# Patient Record
Sex: FEMALE | Race: WHITE | NOT HISPANIC OR LATINO | Employment: OTHER | URBAN - METROPOLITAN AREA
[De-identification: names, ages, dates, MRNs, and addresses within clinical notes are randomized per-mention and may not be internally consistent; named-entity substitution may affect disease eponyms.]

---

## 2023-04-26 LAB
ALBUMIN (G/DL) IN SER/PLAS: 4.2 G/DL (ref 3.4–5)
ANION GAP IN SER/PLAS: 14 MMOL/L (ref 10–20)
CALCIUM (MG/DL) IN SER/PLAS: 8.7 MG/DL (ref 8.6–10.3)
CARBON DIOXIDE, TOTAL (MMOL/L) IN SER/PLAS: 26 MMOL/L (ref 21–32)
CHLORIDE (MMOL/L) IN SER/PLAS: 104 MMOL/L (ref 98–107)
CREATININE (MG/DL) IN SER/PLAS: 2.29 MG/DL (ref 0.5–1.05)
ERYTHROCYTE DISTRIBUTION WIDTH (RATIO) BY AUTOMATED COUNT: 13.2 % (ref 11.5–14.5)
ERYTHROCYTE MEAN CORPUSCULAR HEMOGLOBIN CONCENTRATION (G/DL) BY AUTOMATED: 31 G/DL (ref 32–36)
ERYTHROCYTE MEAN CORPUSCULAR VOLUME (FL) BY AUTOMATED COUNT: 96 FL (ref 80–100)
ERYTHROCYTES (10*6/UL) IN BLOOD BY AUTOMATED COUNT: 3.73 X10E12/L (ref 4–5.2)
GFR FEMALE: 20 ML/MIN/1.73M2
GLUCOSE (MG/DL) IN SER/PLAS: 95 MG/DL (ref 74–99)
HEMATOCRIT (%) IN BLOOD BY AUTOMATED COUNT: 35.8 % (ref 36–46)
HEMOGLOBIN (G/DL) IN BLOOD: 11.1 G/DL (ref 12–16)
LEUKOCYTES (10*3/UL) IN BLOOD BY AUTOMATED COUNT: 5.6 X10E9/L (ref 4.4–11.3)
PHOSPHATE (MG/DL) IN SER/PLAS: 4 MG/DL (ref 2.5–4.9)
PLATELETS (10*3/UL) IN BLOOD AUTOMATED COUNT: 272 X10E9/L (ref 150–450)
POTASSIUM (MMOL/L) IN SER/PLAS: 5 MMOL/L (ref 3.5–5.3)
SODIUM (MMOL/L) IN SER/PLAS: 139 MMOL/L (ref 136–145)
UREA NITROGEN (MG/DL) IN SER/PLAS: 33 MG/DL (ref 6–23)

## 2023-04-27 LAB — PARATHYRIN INTACT (PG/ML) IN SER/PLAS: 72.5 PG/ML (ref 18.5–88)

## 2023-06-19 LAB
ERYTHROCYTE DISTRIBUTION WIDTH (RATIO) BY AUTOMATED COUNT: 12.8 % (ref 11.5–14.5)
ERYTHROCYTE MEAN CORPUSCULAR HEMOGLOBIN CONCENTRATION (G/DL) BY AUTOMATED: 31 G/DL (ref 32–36)
ERYTHROCYTE MEAN CORPUSCULAR VOLUME (FL) BY AUTOMATED COUNT: 92 FL (ref 80–100)
ERYTHROCYTES (10*6/UL) IN BLOOD BY AUTOMATED COUNT: 3.63 X10E12/L (ref 4–5.2)
FERRITIN (UG/LL) IN SER/PLAS: 1093 UG/L (ref 8–150)
HEMATOCRIT (%) IN BLOOD BY AUTOMATED COUNT: 33.5 % (ref 36–46)
HEMOGLOBIN (G/DL) IN BLOOD: 10.4 G/DL (ref 12–16)
LEUKOCYTES (10*3/UL) IN BLOOD BY AUTOMATED COUNT: 5.6 X10E9/L (ref 4.4–11.3)
PLATELETS (10*3/UL) IN BLOOD AUTOMATED COUNT: 269 X10E9/L (ref 150–450)

## 2023-09-07 ENCOUNTER — NURSING HOME VISIT (OUTPATIENT)
Dept: POST ACUTE CARE | Facility: EXTERNAL LOCATION | Age: 88
End: 2023-09-07
Payer: MEDICARE

## 2023-09-07 DIAGNOSIS — S32.059D CLOSED FRACTURE OF FIFTH LUMBAR VERTEBRA WITH ROUTINE HEALING, UNSPECIFIED FRACTURE MORPHOLOGY, SUBSEQUENT ENCOUNTER: ICD-10-CM

## 2023-09-07 DIAGNOSIS — I10 HYPERTENSION, UNSPECIFIED TYPE: ICD-10-CM

## 2023-09-07 DIAGNOSIS — N13.9 URINARY OBSTRUCTION: ICD-10-CM

## 2023-09-07 PROCEDURE — 99305 1ST NF CARE MODERATE MDM 35: CPT | Performed by: FAMILY MEDICINE

## 2023-09-07 NOTE — PROGRESS NOTES
Camelia Jones is a 89 y.o. female with Chief Complaint of SNF ([facility]) H&P    Resident seen 23 -- MP    CC: SNF ([facility]) H&P    : 1934  SNF H&P done 23 (EPIC)  Allergy: Azithro, Codeine, PCN, Cipro, Sulfa, Corn  DNR-CCA, NO HD    S: 89 yo woman with hx of HLD, CKD4, and HTN admitted to SNF rehab after hospitalization for mechanical fall and displaced fracture of L5 and sacral ala. Pain controlled at rest with tylenol. No cp/sob. C/O of inability to empty bladder. Med List & Problem list reviewed.    O: VSS AFEB Wt pending. A&O NAD. Chest cta. heart rrr. Ext no c/c/e. TTP lumbosacral spine.    LAB (23) Pending    A/P:  # L5 & Sacral Ala fx: pain control with tylenol. SNF PT/OT. F/U with ortho.  # HTN: stable on norvasc 10, Coreg 12.5 bid.  # Urinary obstruction: 200+ PVR. Send for UA C&S, LUCIANO placed.  # HLD: statin, ASA  # CKD4: monitor labs. F/U [facility]. NO HD.    Past Surgical History:   Procedure Laterality Date    APPENDECTOMY  2013    Appendectomy    CHOLECYSTECTOMY  2015    Cholecystectomy Laparoscopic    OTHER SURGICAL HISTORY  2013    Treatment Of Hip Fracture    OTHER SURGICAL HISTORY  2013    Marsupialization Of Ovarian Cyst Laparoscopic    TOTAL ABDOMINAL HYSTERECTOMY  2013    Total Abdominal Hysterectomy      Social History     Socioeconomic History    Marital status:      Spouse name: Not on file    Number of children: Not on file    Years of education: Not on file    Highest education level: Not on file   Occupational History    Not on file   Tobacco Use    Smoking status: Unknown    Smokeless tobacco: Not on file   Substance and Sexual Activity    Alcohol use: Not on file    Drug use: Not on file    Sexual activity: Not on file   Other Topics Concern    Not on file   Social History Narrative    Not on file     Social Determinants of Health     Financial Resource Strain: Not on file   Food Insecurity: Not on file   Transportation Needs:  Not on file   Physical Activity: Not on file   Stress: Not on file   Social Connections: Not on file   Intimate Partner Violence: Not on file   Housing Stability: Not on file     Past Medical History:   Diagnosis Date    Other conditions influencing health status     Coronary Artery Disease    Other constipation     Chronic constipation    Pain in unspecified hip     Joint pain, hip    Personal history of other diseases of the circulatory system     History of hypertension    Personal history of other diseases of the respiratory system     History of allergic rhinitis    Personal history of other endocrine, nutritional and metabolic disease     History of hyperlipidemia    Personal history of other medical treatment 11/14/2019    History of screening mammography    Personal history of other mental and behavioral disorders     History of depression      No family history on file.     Review of Systems   Constitutional:  Negative for chills, fatigue and fever.   HENT:  Negative for rhinorrhea and sore throat.    Eyes:  Negative for pain and redness.   Respiratory:  Negative for cough and shortness of breath.    Cardiovascular:  Negative for chest pain and palpitations.   Gastrointestinal:  Negative for abdominal pain and blood in stool.   Endocrine: Negative for polydipsia and polyuria.   Genitourinary:  Negative for dysuria and hematuria.   Musculoskeletal:  Positive for back pain. Negative for neck stiffness.   Skin:  Negative for rash and wound.   Allergic/Immunologic: Negative for environmental allergies and food allergies.   Neurological:  Positive for weakness. Negative for headaches.   Hematological:  Negative for adenopathy. Does not bruise/bleed easily.   Psychiatric/Behavioral:  Negative for hallucinations and suicidal ideas.       There were no vitals taken for this visit.  Physical Exam  Vitals reviewed.   Constitutional:       General: She is not in acute distress.     Appearance: She is not  "ill-appearing.   HENT:      Head: Normocephalic and atraumatic.      Right Ear: Tympanic membrane normal.      Left Ear: Tympanic membrane normal.      Nose: No congestion or rhinorrhea.      Mouth/Throat:      Pharynx: No oropharyngeal exudate or posterior oropharyngeal erythema.   Eyes:      Extraocular Movements: Extraocular movements intact.      Conjunctiva/sclera: Conjunctivae normal.      Pupils: Pupils are equal, round, and reactive to light.   Cardiovascular:      Rate and Rhythm: Normal rate and regular rhythm.      Heart sounds: No murmur heard.     No friction rub. No gallop.   Pulmonary:      Effort: Pulmonary effort is normal.      Breath sounds: Normal breath sounds. No wheezing, rhonchi or rales.   Abdominal:      General: There is no distension.      Palpations: Abdomen is soft.      Tenderness: There is no abdominal tenderness. There is no guarding or rebound.   Musculoskeletal:         General: Tenderness present. No swelling or deformity.      Cervical back: Normal range of motion and neck supple.      Right lower leg: No edema.      Left lower leg: No edema.   Skin:     Capillary Refill: Capillary refill takes less than 2 seconds.      Coloration: Skin is not jaundiced.      Findings: No rash.   Neurological:      General: No focal deficit present.      Mental Status: She is alert.      Motor: Weakness present.   Psychiatric:         Mood and Affect: Mood normal.         Behavior: Behavior normal.       Lab Results   Component Value Date    WBC 11.0 09/17/2023    HGB 9.6 (L) 09/17/2023    HCT 28.9 (L) 09/17/2023    MCV 86 09/17/2023     09/17/2023     Lab Results   Component Value Date    CHOL 161 09/06/2022    CHOL 155 03/15/2022    CHOL 150 11/04/2021     Lab Results   Component Value Date    HDL 59.3 09/06/2022    HDL 60.0 03/15/2022    HDL 51.8 11/04/2021     No results found for: \"LDLCALC\"  Lab Results   Component Value Date    TRIG 136 09/06/2022    TRIG 130 03/15/2022    TRIG 136 " "11/04/2021     No components found for: \"CHOLHDL\"  Lab Results   Component Value Date    HGBA1C 4.8 09/06/2022       Assessment/Plan   Problem List Items Addressed This Visit       Closed fracture of fifth lumbar vertebra with routine healing    Hypertension    Urinary obstruction       "

## 2023-09-07 NOTE — LETTER
Patient: Camelia Jones  : 1934    Encounter Date: 2023    Camelia Jones is a 89 y.o. female with Chief Complaint of SNF ([facility]) H&P    Resident seen 23 -- MP    CC: SNF ([facility]) H&P    : 1934  SNF H&P done 23 (EPIC)  Allergy: Azithro, Codeine, PCN, Cipro, Sulfa, Corn  DNR-CCA, NO HD    S: 89 yo woman with hx of HLD, CKD4, and HTN admitted to SNF rehab after hospitalization for mechanical fall and displaced fracture of L5 and sacral ala. Pain controlled at rest with tylenol. No cp/sob. C/O of inability to empty bladder. Med List & Problem list reviewed.    O: VSS AFEB Wt pending. A&O NAD. Chest cta. heart rrr. Ext no c/c/e. TTP lumbosacral spine.    LAB (23) Pending    A/P:  # L5 & Sacral Ala fx: pain control with tylenol. SNF PT/OT. F/U with ortho.  # HTN: stable on norvasc 10, Coreg 12.5 bid.  # Urinary obstruction: 200+ PVR. Send for UA C&S, LUCIANO placed.  # HLD: statin, ASA  # CKD4: monitor labs. F/U Nephro [facility]. NO HD.    Past Surgical History:   Procedure Laterality Date   • APPENDECTOMY  2013    Appendectomy   • CHOLECYSTECTOMY  2015    Cholecystectomy Laparoscopic   • OTHER SURGICAL HISTORY  2013    Treatment Of Hip Fracture   • OTHER SURGICAL HISTORY  2013    Marsupialization Of Ovarian Cyst Laparoscopic   • TOTAL ABDOMINAL HYSTERECTOMY  2013    Total Abdominal Hysterectomy      Social History     Socioeconomic History   • Marital status:      Spouse name: Not on file   • Number of children: Not on file   • Years of education: Not on file   • Highest education level: Not on file   Occupational History   • Not on file   Tobacco Use   • Smoking status: Unknown   • Smokeless tobacco: Not on file   Substance and Sexual Activity   • Alcohol use: Not on file   • Drug use: Not on file   • Sexual activity: Not on file   Other Topics Concern   • Not on file   Social History Narrative   • Not on file     Social Determinants of Health      Financial Resource Strain: Not on file   Food Insecurity: Not on file   Transportation Needs: Not on file   Physical Activity: Not on file   Stress: Not on file   Social Connections: Not on file   Intimate Partner Violence: Not on file   Housing Stability: Not on file     Past Medical History:   Diagnosis Date   • Other conditions influencing health status     Coronary Artery Disease   • Other constipation     Chronic constipation   • Pain in unspecified hip     Joint pain, hip   • Personal history of other diseases of the circulatory system     History of hypertension   • Personal history of other diseases of the respiratory system     History of allergic rhinitis   • Personal history of other endocrine, nutritional and metabolic disease     History of hyperlipidemia   • Personal history of other medical treatment 11/14/2019    History of screening mammography   • Personal history of other mental and behavioral disorders     History of depression      No family history on file.     Review of Systems   Constitutional:  Negative for chills, fatigue and fever.   HENT:  Negative for rhinorrhea and sore throat.    Eyes:  Negative for pain and redness.   Respiratory:  Negative for cough and shortness of breath.    Cardiovascular:  Negative for chest pain and palpitations.   Gastrointestinal:  Negative for abdominal pain and blood in stool.   Endocrine: Negative for polydipsia and polyuria.   Genitourinary:  Negative for dysuria and hematuria.   Musculoskeletal:  Positive for back pain. Negative for neck stiffness.   Skin:  Negative for rash and wound.   Allergic/Immunologic: Negative for environmental allergies and food allergies.   Neurological:  Positive for weakness. Negative for headaches.   Hematological:  Negative for adenopathy. Does not bruise/bleed easily.   Psychiatric/Behavioral:  Negative for hallucinations and suicidal ideas.       There were no vitals taken for this visit.  Physical Exam  Vitals  reviewed.   Constitutional:       General: She is not in acute distress.     Appearance: She is not ill-appearing.   HENT:      Head: Normocephalic and atraumatic.      Right Ear: Tympanic membrane normal.      Left Ear: Tympanic membrane normal.      Nose: No congestion or rhinorrhea.      Mouth/Throat:      Pharynx: No oropharyngeal exudate or posterior oropharyngeal erythema.   Eyes:      Extraocular Movements: Extraocular movements intact.      Conjunctiva/sclera: Conjunctivae normal.      Pupils: Pupils are equal, round, and reactive to light.   Cardiovascular:      Rate and Rhythm: Normal rate and regular rhythm.      Heart sounds: No murmur heard.     No friction rub. No gallop.   Pulmonary:      Effort: Pulmonary effort is normal.      Breath sounds: Normal breath sounds. No wheezing, rhonchi or rales.   Abdominal:      General: There is no distension.      Palpations: Abdomen is soft.      Tenderness: There is no abdominal tenderness. There is no guarding or rebound.   Musculoskeletal:         General: Tenderness present. No swelling or deformity.      Cervical back: Normal range of motion and neck supple.      Right lower leg: No edema.      Left lower leg: No edema.   Skin:     Capillary Refill: Capillary refill takes less than 2 seconds.      Coloration: Skin is not jaundiced.      Findings: No rash.   Neurological:      General: No focal deficit present.      Mental Status: She is alert.      Motor: Weakness present.   Psychiatric:         Mood and Affect: Mood normal.         Behavior: Behavior normal.       Lab Results   Component Value Date    WBC 11.0 09/17/2023    HGB 9.6 (L) 09/17/2023    HCT 28.9 (L) 09/17/2023    MCV 86 09/17/2023     09/17/2023     Lab Results   Component Value Date    CHOL 161 09/06/2022    CHOL 155 03/15/2022    CHOL 150 11/04/2021     Lab Results   Component Value Date    HDL 59.3 09/06/2022    HDL 60.0 03/15/2022    HDL 51.8 11/04/2021     No results found for:  "\"LDLCALC\"  Lab Results   Component Value Date    TRIG 136 09/06/2022    TRIG 130 03/15/2022    TRIG 136 11/04/2021     No components found for: \"CHOLHDL\"  Lab Results   Component Value Date    HGBA1C 4.8 09/06/2022       Assessment/Plan  Problem List Items Addressed This Visit       Closed fracture of fifth lumbar vertebra with routine healing    Hypertension    Urinary obstruction         Electronically Signed By: Trav Rodriguez MD   9/17/23  5:47 PM  "

## 2023-09-11 ENCOUNTER — NURSING HOME VISIT (OUTPATIENT)
Dept: POST ACUTE CARE | Facility: EXTERNAL LOCATION | Age: 88
End: 2023-09-11
Payer: MEDICARE

## 2023-09-11 DIAGNOSIS — S32.9XXD CLOSED NONDISPLACED FRACTURE OF PELVIS WITH ROUTINE HEALING, UNSPECIFIED PART OF PELVIS, SUBSEQUENT ENCOUNTER: ICD-10-CM

## 2023-09-11 DIAGNOSIS — E53.8 VITAMIN B12 DEFICIENCY: ICD-10-CM

## 2023-09-11 PROCEDURE — 99309 SBSQ NF CARE MODERATE MDM 30: CPT | Performed by: FAMILY MEDICINE

## 2023-09-11 NOTE — PROGRESS NOTES
Resident seen 23 -- MP    CC: SNF ([facility]) Recheck    : 1934  SNF H&P done 23 (EPIC)  Allergy: Azithro, Codeine, PCN, Cipro, Sulfa, Corn  DNR-CCA, NO HD    S: 89 yo woman with hx of HLD, CKD4, HTN, and pain limiting rehab due to displaced fracture of L5 and sacral ala. Pain POORLY controlled at rest with tylenol. No cp/sob. Luciano placed for PVR >200. Med List & Problem list reviewed.    23 D/W Dtr Felicia Werner on speakerphone with patient. Due for starting weekly B12 injections. Dr Bernard needs to approve any change in pain meds.    O: VSS AFEB 114#. A&O NAD. Chest cta. heart rrr. Ext no c/c/e. TTP lumbosacral spine.    LAB (23) UA NEG. Alb 3, Cr 2.09, Na 134, GFR 22. Hgb 9.6.    A/P:  # L5 & Sacral Ala fx: pain control inadequate with tylenol. Per [facility] safe to use oxycodone -- start 2.5 mg q4h prn pain #90. SNF PT/OT. F/U with ortho.  # HTN: stable on norvasc 10, Coreg 12.5 bid.  # Urinary obstruction: 200+ PVR. LUCIANO placed 23 -- voiding trial later this week.  # HLD: statin, ASA  # B12 deficiency anemia: B12 1mg IM weekly x 8.  # CKD4: monitor labs. F/U Nephro [facility]. NO HD.

## 2023-09-11 NOTE — LETTER
Patient: Camelia Jones  : 1934    Encounter Date: 2023    Resident seen 23 -- MP    CC: SNF ([facility]) Recheck    : 1934  SNF H&P done 23 (EPIC)  Allergy: Azithro, Codeine, PCN, Cipro, Sulfa, Corn  DNR-CCA, NO HD    S: 89 yo woman with hx of HLD, CKD4, HTN, and pain limiting rehab due to displaced fracture of L5 and sacral ala. Pain POORLY controlled at rest with tylenol. No cp/sob. Luciano placed for PVR >200. Med List & Problem list reviewed.    23 D/W Dtr Felicia Werner on speakerphone with patient. Due for starting weekly B12 injections. Dr Bernard needs to approve any change in pain meds.    O: VSS AFEB 114#. A&O NAD. Chest cta. heart rrr. Ext no c/c/e. TTP lumbosacral spine.    LAB (23) UA NEG. Alb 3, Cr 2.09, Na 134, GFR 22. Hgb 9.6.    A/P:  # L5 & Sacral Ala fx: pain control inadequate with tylenol. Per [facility] safe to use oxycodone -- start 2.5 mg q4h prn pain #90. SNF PT/OT. F/U with ortho.  # HTN: stable on norvasc 10, Coreg 12.5 bid.  # Urinary obstruction: 200+ PVR. LUCIANO placed 23 -- voiding trial later this week.  # HLD: statin, ASA  # B12 deficiency anemia: B12 1mg IM weekly x 8.  # CKD4: monitor labs. F/U Nephro [facility]. NO HD.      Electronically Signed By: Trav Rodriguez MD   23  5:39 PM

## 2023-09-14 ENCOUNTER — NURSING HOME VISIT (OUTPATIENT)
Dept: POST ACUTE CARE | Facility: EXTERNAL LOCATION | Age: 88
End: 2023-09-14
Payer: MEDICARE

## 2023-09-14 DIAGNOSIS — E78.5 DYSLIPIDEMIA: ICD-10-CM

## 2023-09-14 DIAGNOSIS — D64.9 ANEMIA, UNSPECIFIED TYPE: ICD-10-CM

## 2023-09-14 DIAGNOSIS — S32.9XXD CLOSED NONDISPLACED FRACTURE OF PELVIS WITH ROUTINE HEALING, UNSPECIFIED PART OF PELVIS, SUBSEQUENT ENCOUNTER: Primary | ICD-10-CM

## 2023-09-14 DIAGNOSIS — I10 HYPERTENSION, UNSPECIFIED TYPE: ICD-10-CM

## 2023-09-14 DIAGNOSIS — N18.9 CHRONIC KIDNEY DISEASE, UNSPECIFIED CKD STAGE: ICD-10-CM

## 2023-09-14 DIAGNOSIS — Z29.89 NEED FOR PROPHYLAXIS AGAINST URINARY TRACT INFECTION: ICD-10-CM

## 2023-09-14 DIAGNOSIS — J30.9 ALLERGIC RHINITIS, UNSPECIFIED SEASONALITY, UNSPECIFIED TRIGGER: ICD-10-CM

## 2023-09-14 DIAGNOSIS — E56.9 VITAMIN DEFICIENCY: ICD-10-CM

## 2023-09-14 DIAGNOSIS — E87.1 HYPONATREMIA: ICD-10-CM

## 2023-09-14 DIAGNOSIS — S32.059D CLOSED FRACTURE OF FIFTH LUMBAR VERTEBRA WITH ROUTINE HEALING, UNSPECIFIED FRACTURE MORPHOLOGY, SUBSEQUENT ENCOUNTER: ICD-10-CM

## 2023-09-14 PROCEDURE — 99310 SBSQ NF CARE HIGH MDM 45: CPT | Performed by: NURSE PRACTITIONER

## 2023-09-16 NOTE — PROGRESS NOTES
*Provider Impression*    Patient is a 89 year old female who is seen today for management of multiple medical problems       #Fracture of L5 vertebra & L sacral ala - PT/OT; acetaminophen 500mg q4h PRN, lidocaine daily, oxycodone 2.5mg q4h PRN  #Hyponatremia / CKD - 1500mL fluid restriction, check renal panel serum/urine osmolalty in am, f/u w/ Dr. Bernard  #Anemia / Vitamin deficiency - vitamin E 400units daily, zinc 50mg daily, calcium 1250mg daily, vitamin B12 1mg weekly  #HTN / HLD - fish oil 1000mg daily, amlodipine 10mg daily, pravastatin 20mg daily, ASA 81mg daily  #UTI prophylaxis - cranberry 600mg daily  #Allergic rhinitis - zyrtec 10mg daily, saline gel BID  #ACP - Full Code  Follow up as needed      *Chief Complaint*     hyponatremia / anemia    *History of Present Illness*    Patient is a 89 y/o female w/ PMH as below who presented to the emergency department after a fall. She was found to have displaced fractures of L5 vertebra and left sacral ala 2/2 fall. She was given tyylenol for pain control d/t codeine allergy. Neurosurgery and orthospine at [facility] were consulted in the ED and they reviewed the images and recommended no acute surgical intervention and to continue with conservative measures. She was also treated for acute hypoxic respiratory failure 2/2 atelectasis and vascular congestion. She was seen by PT/OT who recommended SNF, so she was d/c to [facility].     Nursing staff reported that pt and family requesting evaluation or her anemia, so ordered labs for today, as below which show stable anemia and incidental finding of critical hyponatremia.     She is seen sitting up in her room today an denies any HA, vision change, no new ataxia, no f/c, sweats, CP, SOB, cough, n/v, diarrhea, constipation, LUTS, numbness, tingling, paresthesias, edema, weakness or any other new c/o presently.     Upon chart review of her Na trend: 9/4-139,  9/5-135, 9/6-135, 9/8-134 She is completely asymptomatic and  at baseline. No excessive fluid intake per her or nursing report. No IVFs. Gluc 161. HDS. Could be spurious but this was a peripheral draw and everything else tracks with her norm.  Upon chart review and d/w her daughter there is no relevant history aside from her CKD.  I d/w Dr. Rodriguez - we can't get a stat draw done tonight, but given her clinical presentation and uncertainty about the accuracy of her results, plan to fluid restrict her overnight and confirm with repeat renal panel in am - will try to get serum and urine osmolalities and lytes as well. D/w Dr. Bernard as well in agreement w/ plan.     [place]iges - Azithromycin, Codeine, Penicillin, Cipro, Sulfa Antibiotics, Corn   PMH - hypertension, hyperlipidemia, anemia, CKD, DVT, anemia, OA, PVD, CAD,   PSH -  hip surgery, appendectomy, marsupialization of ovarian cyst, HOMERO  FH - Brother had A-fib  SocHx - Never smoker, No EtOH    *Review of Systems*  All other systems reviewed are negative except as noted in the HPI     *Vital Signs*   Date: 9/14/23  - T: 97.3  P: 88  R: 16  BP: 159/60  SpO2: 93% on RA     *Results / Data*  CBC - Date: 9/14/23  WBC: 10.87  HGB: 10.8  HCT: 32.7  PLT: 528  ;   BMP - Date: 9/14/23  Na: 118  K: 4.9  Cl: 83  CO2: 20  BUN: 32  Cr: 1.91  Glu: 161  Ca: 8.5  ;   LFT - Date: 9/14/23  AST: 21  ALT: 12  ALP: 145  TBili: 0.3  ;   Coags - Date:   INR:   PT:  Other - Date: 9/14/23  Iron: 34  TIBC: 186  B12: >2000  Folate: >20.0  Ferritin: >2000  Retic: 2.5%    *Physical Exam*  Gen: (+) NAD, (+) well-appearing  HEENT: (+) normocephalic, (+) MMM  Neck: (+) supple  Lungs: (+) CTAB, (-) wheezes, (-) rales, (-) rhonchi  Heart: (+) RRR, (+) S1 S2, (-) murmurs   Pulses: (+) palpable  Abd: (+) soft, (+) NT, (+) ND, (+) BS+  Ext: (-) edema, (-) deformity  MSK: (-) joint swelling  Skin: (+) warm, (+) dry, (-) rash  Neuro: (+) follows commands, (-) tremor, (+) alert

## 2023-09-17 ASSESSMENT — ENCOUNTER SYMPTOMS
BLOOD IN STOOL: 0
PALPITATIONS: 0
BACK PAIN: 1
ADENOPATHY: 0
DYSURIA: 0
BRUISES/BLEEDS EASILY: 0
COUGH: 0
WOUND: 0
ABDOMINAL PAIN: 0
EYE PAIN: 0
HEMATURIA: 0
SORE THROAT: 0
FATIGUE: 0
SHORTNESS OF BREATH: 0
HEADACHES: 0
WEAKNESS: 1
CHILLS: 0
HALLUCINATIONS: 0
NECK STIFFNESS: 0
FEVER: 0
POLYDIPSIA: 0
EYE REDNESS: 0
RHINORRHEA: 0

## 2023-09-21 ENCOUNTER — NURSING HOME VISIT (OUTPATIENT)
Dept: POST ACUTE CARE | Facility: EXTERNAL LOCATION | Age: 88
End: 2023-09-21
Payer: MEDICARE

## 2023-09-21 DIAGNOSIS — E87.1 HYPONATREMIA: ICD-10-CM

## 2023-09-21 DIAGNOSIS — I10 PRIMARY HYPERTENSION: ICD-10-CM

## 2023-09-21 DIAGNOSIS — S32.9XXD CLOSED NONDISPLACED FRACTURE OF PELVIS WITH ROUTINE HEALING, UNSPECIFIED PART OF PELVIS, SUBSEQUENT ENCOUNTER: ICD-10-CM

## 2023-09-21 PROCEDURE — 99305 1ST NF CARE MODERATE MDM 35: CPT | Performed by: FAMILY MEDICINE

## 2023-09-21 NOTE — LETTER
Patient: Camelia Jones  : 1934    Encounter Date: 2023    Camelia Jones is a 89 y.o. female with Chief Complaint of SNF ([facility]) Re-Admit H&P    Resident seen 23 -- MP    CC: SNF ([facility]) Re-admit H&P    : 1934  SNF H&P done 23, 23 (EPIC)  Allergy: Azithro, Codeine, PCN, Cipro, Sulfa, Corn  DNR-CCA, NO HD    S: 89 yo woman with hx of HLD, CKD4, HTN, and pain limiting rehab due to displaced fracture of L5 and sacral ala. Readmitted to SNF after interval hospitalization for severe hyponatremia (111)-- corrected and back for more rehab. Med List & Problem list reviewed.    23 Per clarification with Dr Bernard -- safe to use opioids.    O: VSS AFEB 114# (23). A&O NAD. Chest cta. heart rrr. Ext no c/c/e. TTP lumbosacral spine.    LAB (23) Urine Electrophoresis -- No M Protein. Urine osmolality 513.  (23) UA NEG. Alb 3, Cr 2.09, Na 134, GFR 22. Hgb 9.6.    A/P:  # Hyponatremia: NCl 1gm daily. Monitor levels. 1L FR.  # L5 & Sacral Ala fx: pain control inadequate with tylenol. Oxycodone prn pain. SNF PT/OT. F/U with ortho. Lidoderm patch.  # HTN: stable on norvasc 10, Coreg 12.5 bid.  # Urinary obstruction: 200+ PVR. LUCIANO placed 23. Removed in hospital -- voiding well.  # HLD: statin, ASA  # B12 deficiency anemia: B12 1mg IM weekly x 8.  # CKD4: monitor labs. F/U Nephro [facility]. NO HD.    Past Surgical History:   Procedure Laterality Date   • APPENDECTOMY  2013    Appendectomy   • CHOLECYSTECTOMY  2015    Cholecystectomy Laparoscopic   • OTHER SURGICAL HISTORY  2013    Treatment Of Hip Fracture   • OTHER SURGICAL HISTORY  2013    Marsupialization Of Ovarian Cyst Laparoscopic   • TOTAL ABDOMINAL HYSTERECTOMY  2013    Total Abdominal Hysterectomy      Social History     Socioeconomic History   • Marital status:      Spouse name: Not on file   • Number of children: Not on file   • Years of education: Not on file   • Highest  education level: Not on file   Occupational History   • Not on file   Tobacco Use   • Smoking status: Unknown   • Smokeless tobacco: Not on file   Substance and Sexual Activity   • Alcohol use: Not on file   • Drug use: Not on file   • Sexual activity: Not on file   Other Topics Concern   • Not on file   Social History Narrative   • Not on file     Social Determinants of Health     Financial Resource Strain: Not on file   Food Insecurity: Not on file   Transportation Needs: Not on file   Physical Activity: Not on file   Stress: Not on file   Social Connections: Not on file   Intimate Partner Violence: Not on file   Housing Stability: Not on file     Past Medical History:   Diagnosis Date   • Other conditions influencing health status     Coronary Artery Disease   • Other constipation     Chronic constipation   • Pain in unspecified hip     Joint pain, hip   • Personal history of other diseases of the circulatory system     History of hypertension   • Personal history of other diseases of the respiratory system     History of allergic rhinitis   • Personal history of other endocrine, nutritional and metabolic disease     History of hyperlipidemia   • Personal history of other medical treatment 11/14/2019    History of screening mammography   • Personal history of other mental and behavioral disorders     History of depression      No family history on file.     Review of Systems   Constitutional:  Negative for chills, fatigue and fever.   HENT:  Negative for rhinorrhea and sore throat.    Eyes:  Negative for pain and redness.   Respiratory:  Negative for cough and shortness of breath.    Cardiovascular:  Negative for chest pain and palpitations.   Gastrointestinal:  Negative for abdominal pain and blood in stool.   Endocrine: Negative for polydipsia and polyuria.   Genitourinary:  Positive for pelvic pain. Negative for dysuria and hematuria.   Musculoskeletal:  Negative for back pain and neck stiffness.   Skin:   Negative for rash and wound.   Allergic/Immunologic: Negative for environmental allergies and food allergies.   Neurological:  Positive for weakness. Negative for headaches.   Hematological:  Negative for adenopathy. Does not bruise/bleed easily.   Psychiatric/Behavioral:  Negative for hallucinations and suicidal ideas.       There were no vitals taken for this visit.  Physical Exam  Vitals reviewed.   Constitutional:       General: She is not in acute distress.     Appearance: She is not ill-appearing.   HENT:      Head: Normocephalic and atraumatic.      Right Ear: Tympanic membrane normal.      Left Ear: Tympanic membrane normal.      Nose: No congestion or rhinorrhea.      Mouth/Throat:      Pharynx: No oropharyngeal exudate or posterior oropharyngeal erythema.   Eyes:      Extraocular Movements: Extraocular movements intact.      Conjunctiva/sclera: Conjunctivae normal.      Pupils: Pupils are equal, round, and reactive to light.   Cardiovascular:      Rate and Rhythm: Normal rate and regular rhythm.      Heart sounds: No murmur heard.     No friction rub. No gallop.   Pulmonary:      Effort: Pulmonary effort is normal.      Breath sounds: Normal breath sounds. No wheezing, rhonchi or rales.   Abdominal:      General: There is no distension.      Palpations: Abdomen is soft.      Tenderness: There is no abdominal tenderness. There is no guarding or rebound.   Musculoskeletal:         General: No swelling or deformity.      Cervical back: Normal range of motion and neck supple.      Right lower leg: No edema.      Left lower leg: No edema.   Skin:     Capillary Refill: Capillary refill takes less than 2 seconds.      Coloration: Skin is not jaundiced.      Findings: No rash.   Neurological:      General: No focal deficit present.      Mental Status: She is alert.      Motor: Weakness present.   Psychiatric:         Mood and Affect: Mood normal.         Behavior: Behavior normal.       Lab Results   Component  "Value Date    WBC 7.9 09/21/2023    HGB 8.7 (L) 09/21/2023    HCT 28.1 (L) 09/21/2023    MCV 91 09/21/2023     09/21/2023     Lab Results   Component Value Date    CHOL 161 09/06/2022    CHOL 155 03/15/2022    CHOL 150 11/04/2021     Lab Results   Component Value Date    HDL 59.3 09/06/2022    HDL 60.0 03/15/2022    HDL 51.8 11/04/2021     No results found for: \"LDLCALC\"  Lab Results   Component Value Date    TRIG 136 09/06/2022    TRIG 130 03/15/2022    TRIG 136 11/04/2021     No components found for: \"CHOLHDL\"  Lab Results   Component Value Date    HGBA1C 4.8 09/06/2022       Assessment/Plan  Problem List Items Addressed This Visit       Hypertension    Hyponatremia    Closed nondisplaced fracture of pelvis with routine healing         Electronically Signed By: Trav Rodriguez MD   9/26/23  5:48 PM  "

## 2023-09-25 ENCOUNTER — NURSING HOME VISIT (OUTPATIENT)
Dept: POST ACUTE CARE | Facility: EXTERNAL LOCATION | Age: 88
End: 2023-09-25
Payer: MEDICARE

## 2023-09-25 DIAGNOSIS — E87.1 HYPONATREMIA: ICD-10-CM

## 2023-09-25 DIAGNOSIS — G47.00 INSOMNIA, UNSPECIFIED TYPE: ICD-10-CM

## 2023-09-25 PROBLEM — I82.433 ACUTE DEEP VEIN THROMBOSIS (DVT) OF POPLITEAL VEIN OF BOTH LOWER EXTREMITIES (MULTI): Status: ACTIVE | Noted: 2023-09-25

## 2023-09-25 PROBLEM — I83.90 VARICOSE VEINS OF LOWER EXTREMITY: Status: ACTIVE | Noted: 2023-09-25

## 2023-09-25 PROBLEM — M79.89 LEFT LEG SWELLING: Status: ACTIVE | Noted: 2023-09-25

## 2023-09-25 PROBLEM — I87.002 POST-THROMBOTIC SYNDROME OF LEFT LOWER EXTREMITY: Status: ACTIVE | Noted: 2023-09-25

## 2023-09-25 PROBLEM — T14.8XXA WOUND OF SKIN: Status: ACTIVE | Noted: 2023-09-25

## 2023-09-25 PROBLEM — I82.521: Status: ACTIVE | Noted: 2023-03-29

## 2023-09-25 PROCEDURE — 99309 SBSQ NF CARE MODERATE MDM 30: CPT | Performed by: FAMILY MEDICINE

## 2023-09-25 RX ORDER — ADHESIVE BANDAGE
30 BANDAGE TOPICAL
COMMUNITY

## 2023-09-25 RX ORDER — CARVEDILOL 12.5 MG/1
TABLET ORAL
COMMUNITY
Start: 2018-07-26

## 2023-09-25 RX ORDER — SODIUM CHLORIDE 1000 MG
TABLET, SOLUBLE MISCELLANEOUS
COMMUNITY
Start: 2023-09-21 | End: 2023-11-19

## 2023-09-25 RX ORDER — FERROUS SULFATE 325(65) MG
TABLET ORAL EVERY 12 HOURS
COMMUNITY

## 2023-09-25 RX ORDER — CALCIUM CARBONATE 500(1250)
250 TABLET ORAL
COMMUNITY

## 2023-09-25 RX ORDER — ZINC GLUCONATE 50 MG
TABLET ORAL
COMMUNITY

## 2023-09-25 RX ORDER — CYANOCOBALAMIN 1000 UG/ML
1 INJECTION, SOLUTION INTRAMUSCULAR; SUBCUTANEOUS
COMMUNITY

## 2023-09-25 RX ORDER — AMLODIPINE BESYLATE 5 MG/1
TABLET ORAL
COMMUNITY
Start: 2020-10-22

## 2023-09-25 RX ORDER — PRAVASTATIN SODIUM 20 MG/1
20 TABLET ORAL
COMMUNITY
Start: 2023-05-22 | End: 2024-05-21

## 2023-09-25 RX ORDER — MELATON/THEAN/VAL/LEM/CHAM/LAV 10MG-200MG
TABLET,IMMED, EXTENDED RELEASE, BIPHASIC ORAL
COMMUNITY

## 2023-09-25 RX ORDER — POLYETHYLENE GLYCOL 3350 17 G/17G
POWDER, FOR SOLUTION ORAL
COMMUNITY

## 2023-09-25 RX ORDER — BIOTIN 1 MG
1 TABLET ORAL
COMMUNITY

## 2023-09-25 NOTE — LETTER
Patient: Camelia Jones  : 1934    Encounter Date: 2023    Resident seen 23 -- MP    CC: [facility] ([facility]) Recheck    : 1934  SNF H&P done 23, 23 (EPIC)  Allergy: Azithro, Codeine, PCN, Cipro, Sulfa, Corn  DNR-CCA, NO HD    S: 88 yo woman with hx of HLD, CKD4, HTN, and pain limiting rehab due to displaced fracture of L5 and sacral ala. Recent severe hyponatremia (111)-- corrected and back for more rehab. Med List & Problem list reviewed. No cp/sob. C/O insomnia overnight.    23 Per clarification with Dr Bernard -- safe to use opioids.    O: VSS AFEB 114# (23). A&O NAD. Chest cta. heart rrr. Ext no c/c/e. TTP lumbosacral spine.    LAB (23) Hgb 9.6-> 7.9, Alb 2.8, Cr 2.08-> 2.46, BUN 31, GFR 22->18, K 5.2, Na 134->137  (23) Urine Electrophoresis -- No M Protein. Urine osmolality 513.  (23) UA NEG.    A/P:  # Hyponatremia: NCl 1gm daily. Monitor levels. Increase FR to 1.5L due to volume contraction/Inc Cr.  # L5 & Sacral Ala fx: pain control inadequate with tylenol. Oxycodone prn pain. SNF PT/OT. F/U with ortho. Lidoderm patch.  # HTN: stable on norvasc 10, Coreg 12.5 bid.  # Urinary obstruction: 200+ PVR. LUCIANO placed 23. Removed in hospital -- voiding well.  # HLD: statin, ASA  # B12 deficiency anemia: B12 1mg IM weekly x 8.  # CKD4: monitor labs. F/U Nephro [place]. NO HD.  # Insomnia: ambien 5 mg qhs prn.      Electronically Signed By: Trav Rodriguez MD   23  5:56 PM

## 2023-09-26 PROBLEM — S32.9XXD CLOSED NONDISPLACED FRACTURE OF PELVIS WITH ROUTINE HEALING: Status: ACTIVE | Noted: 2023-09-26

## 2023-09-26 PROBLEM — G47.00 INSOMNIA: Status: ACTIVE | Noted: 2023-09-26

## 2023-09-26 PROBLEM — E87.1 HYPONATREMIA: Status: ACTIVE | Noted: 2023-09-26

## 2023-09-26 ASSESSMENT — ENCOUNTER SYMPTOMS
SORE THROAT: 0
BACK PAIN: 0
HALLUCINATIONS: 0
COUGH: 0
RHINORRHEA: 0
PALPITATIONS: 0
WEAKNESS: 1
SHORTNESS OF BREATH: 0
DYSURIA: 0
NECK STIFFNESS: 0
FEVER: 0
EYE PAIN: 0
ABDOMINAL PAIN: 0
FATIGUE: 0
BLOOD IN STOOL: 0
HEMATURIA: 0
BRUISES/BLEEDS EASILY: 0
EYE REDNESS: 0
WOUND: 0
CHILLS: 0
ADENOPATHY: 0
HEADACHES: 0
POLYDIPSIA: 0

## 2023-09-26 NOTE — PROGRESS NOTES
Camelia Jones is a 89 y.o. female with Chief Complaint of SNF ([facility]) Re-Admit H&P    Resident seen 23 -- MP    CC: SNF ([facility]) Re-admit H&P    : 1934  SNF H&P done 23, 23 (EPIC)  Allergy: Azithro, Codeine, PCN, Cipro, Sulfa, Corn  DNR-CCA, NO HD    S: 88 yo woman with hx of HLD, CKD4, HTN, and pain limiting rehab due to displaced fracture of L5 and sacral ala. Readmitted to SNF after interval hospitalization for severe hyponatremia (111)-- corrected and back for more rehab. Med List & Problem list reviewed.    23 Per clarification with Dr Bernard -- safe to use opioids.    O: VSS AFEB 114# (23). A&O NAD. Chest cta. heart rrr. Ext no c/c/e. TTP lumbosacral spine.    LAB (23) Urine Electrophoresis -- No M Protein. Urine osmolality 513.  (23) UA NEG. Alb 3, Cr 2.09, Na 134, GFR 22. Hgb 9.6.    A/P:  # Hyponatremia: NCl 1gm daily. Monitor levels. 1L FR.  # L5 & Sacral Ala fx: pain control inadequate with tylenol. Oxycodone prn pain. SNF PT/OT. F/U with ortho. Lidoderm patch.  # HTN: stable on norvasc 10, Coreg 12.5 bid.  # Urinary obstruction: 200+ PVR. LUCIANO placed 23. Removed in hospital -- voiding well.  # HLD: statin, ASA  # B12 deficiency anemia: B12 1mg IM weekly x 8.  # CKD4: monitor labs. F/U Nephro [place]. NO HD.    Past Surgical History:   Procedure Laterality Date    APPENDECTOMY  2013    Appendectomy    CHOLECYSTECTOMY  2015    Cholecystectomy Laparoscopic    OTHER SURGICAL HISTORY  2013    Treatment Of Hip Fracture    OTHER SURGICAL HISTORY  2013    Marsupialization Of Ovarian Cyst Laparoscopic    TOTAL ABDOMINAL HYSTERECTOMY  2013    Total Abdominal Hysterectomy      Social History     Socioeconomic History    Marital status:      Spouse name: Not on file    Number of children: Not on file    Years of education: Not on file    Highest education level: Not on file   Occupational History    Not on file   Tobacco Use     Smoking status: Unknown    Smokeless tobacco: Not on file   Substance and Sexual Activity    Alcohol use: Not on file    Drug use: Not on file    Sexual activity: Not on file   Other Topics Concern    Not on file   Social History Narrative    Not on file     Social Determinants of Health     Financial Resource Strain: Not on file   Food Insecurity: Not on file   Transportation Needs: Not on file   Physical Activity: Not on file   Stress: Not on file   Social Connections: Not on file   Intimate Partner Violence: Not on file   Housing Stability: Not on file     Past Medical History:   Diagnosis Date    Other conditions influencing health status     Coronary Artery Disease    Other constipation     Chronic constipation    Pain in unspecified hip     Joint pain, hip    Personal history of other diseases of the circulatory system     History of hypertension    Personal history of other diseases of the respiratory system     History of allergic rhinitis    Personal history of other endocrine, nutritional and metabolic disease     History of hyperlipidemia    Personal history of other medical treatment 11/14/2019    History of screening mammography    Personal history of other mental and behavioral disorders     History of depression      No family history on file.     Review of Systems   Constitutional:  Negative for chills, fatigue and fever.   HENT:  Negative for rhinorrhea and sore throat.    Eyes:  Negative for pain and redness.   Respiratory:  Negative for cough and shortness of breath.    Cardiovascular:  Negative for chest pain and palpitations.   Gastrointestinal:  Negative for abdominal pain and blood in stool.   Endocrine: Negative for polydipsia and polyuria.   Genitourinary:  Positive for pelvic pain. Negative for dysuria and hematuria.   Musculoskeletal:  Negative for back pain and neck stiffness.   Skin:  Negative for rash and wound.   Allergic/Immunologic: Negative for environmental allergies and food  allergies.   Neurological:  Positive for weakness. Negative for headaches.   Hematological:  Negative for adenopathy. Does not bruise/bleed easily.   Psychiatric/Behavioral:  Negative for hallucinations and suicidal ideas.       There were no vitals taken for this visit.  Physical Exam  Vitals reviewed.   Constitutional:       General: She is not in acute distress.     Appearance: She is not ill-appearing.   HENT:      Head: Normocephalic and atraumatic.      Right Ear: Tympanic membrane normal.      Left Ear: Tympanic membrane normal.      Nose: No congestion or rhinorrhea.      Mouth/Throat:      Pharynx: No oropharyngeal exudate or posterior oropharyngeal erythema.   Eyes:      Extraocular Movements: Extraocular movements intact.      Conjunctiva/sclera: Conjunctivae normal.      Pupils: Pupils are equal, round, and reactive to light.   Cardiovascular:      Rate and Rhythm: Normal rate and regular rhythm.      Heart sounds: No murmur heard.     No friction rub. No gallop.   Pulmonary:      Effort: Pulmonary effort is normal.      Breath sounds: Normal breath sounds. No wheezing, rhonchi or rales.   Abdominal:      General: There is no distension.      Palpations: Abdomen is soft.      Tenderness: There is no abdominal tenderness. There is no guarding or rebound.   Musculoskeletal:         General: No swelling or deformity.      Cervical back: Normal range of motion and neck supple.      Right lower leg: No edema.      Left lower leg: No edema.   Skin:     Capillary Refill: Capillary refill takes less than 2 seconds.      Coloration: Skin is not jaundiced.      Findings: No rash.   Neurological:      General: No focal deficit present.      Mental Status: She is alert.      Motor: Weakness present.   Psychiatric:         Mood and Affect: Mood normal.         Behavior: Behavior normal.       Lab Results   Component Value Date    WBC 7.9 09/21/2023    HGB 8.7 (L) 09/21/2023    HCT 28.1 (L) 09/21/2023    MCV 91  "09/21/2023     09/21/2023     Lab Results   Component Value Date    CHOL 161 09/06/2022    CHOL 155 03/15/2022    CHOL 150 11/04/2021     Lab Results   Component Value Date    HDL 59.3 09/06/2022    HDL 60.0 03/15/2022    HDL 51.8 11/04/2021     No results found for: \"LDLCALC\"  Lab Results   Component Value Date    TRIG 136 09/06/2022    TRIG 130 03/15/2022    TRIG 136 11/04/2021     No components found for: \"CHOLHDL\"  Lab Results   Component Value Date    HGBA1C 4.8 09/06/2022       Assessment/Plan   Problem List Items Addressed This Visit       Hypertension    Hyponatremia    Closed nondisplaced fracture of pelvis with routine healing       "

## 2023-09-26 NOTE — PROGRESS NOTES
Resident seen 23 -- MP    CC: SNF ([facility]) Recheck    : 1934  SNF H&P done 23, 23 (EPIC)  Allergy: Azithro, Codeine, PCN, Cipro, Sulfa, Corn  DNR-CCA, NO HD    S: 88 yo woman with hx of HLD, CKD4, HTN, and pain limiting rehab due to displaced fracture of L5 and sacral ala. Recent severe hyponatremia (111)-- corrected and back for more rehab. Med List & Problem list reviewed. No cp/sob. C/O insomnia overnight.    23 Per clarification with Dr Bernard -- safe to use opioids.    O: VSS AFEB 114# (23). A&O NAD. Chest cta. heart rrr. Ext no c/c/e. TTP lumbosacral spine.    LAB (23) Hgb 9.6-> 7.9, Alb 2.8, Cr 2.08-> 2.46, BUN 31, GFR 22->18, K 5.2, Na 134->137  (23) Urine Electrophoresis -- No M Protein. Urine osmolality 513.  (23) UA NEG.    A/P:  # Hyponatremia: NCl 1gm daily. Monitor levels. Increase FR to 1.5L due to volume contraction/Inc Cr.  # L5 & Sacral Ala fx: pain control inadequate with tylenol. Oxycodone prn pain. SNF PT/OT. F/U with ortho. Lidoderm patch.  # HTN: stable on norvasc 10, Coreg 12.5 bid.  # Urinary obstruction: 200+ PVR. LUCIANO placed 23. Removed in hospital -- voiding well.  # HLD: statin, ASA  # B12 deficiency anemia: B12 1mg IM weekly x 8.  # CKD4: monitor labs. F/U Nephro [facility]. NO HD.  # Insomnia: ambien 5 mg qhs prn.

## 2023-09-27 ENCOUNTER — TELEPHONE (OUTPATIENT)
Dept: PRIMARY CARE | Facility: CLINIC | Age: 88
End: 2023-09-27

## 2023-09-27 NOTE — TELEPHONE ENCOUNTER
Felicia Kevin, who is the daughter of Jeff Jones,  1934, who is a patient at [facility], called and has a couple of questions for Dr. Rodriguez. They are regarding an ortho consult order as well as two of the medications that her mom is on. Felicia can be reached at [phone].

## 2023-09-28 ENCOUNTER — NURSING HOME VISIT (OUTPATIENT)
Dept: POST ACUTE CARE | Facility: EXTERNAL LOCATION | Age: 88
End: 2023-09-28
Payer: MEDICARE

## 2023-09-28 DIAGNOSIS — M17.12 PRIMARY OSTEOARTHRITIS OF LEFT KNEE: ICD-10-CM

## 2023-09-28 DIAGNOSIS — G47.00 INSOMNIA, UNSPECIFIED TYPE: ICD-10-CM

## 2023-09-28 PROCEDURE — 99309 SBSQ NF CARE MODERATE MDM 30: CPT | Performed by: FAMILY MEDICINE

## 2023-09-28 NOTE — PROGRESS NOTES
Resident seen 23 -- MP    CC: SNF ([facility]) Recheck    : 1934  SNF H&P done 23, 23 (EPIC)  Allergy: Azithro, Codeine, PCN, Cipro, Sulfa, Corn  DNR-CCA, NO HD    S: 89 yo woman with hx of HLD, CKD4, HTN, and pain limiting rehab due to displaced fracture of L5 and sacral ala. Recent severe hyponatremia (111)-- corrected and back for more rehab. Med List & Problem list reviewed. No cp/sob. C/O insomnia overnight.    23 D/W Dtr Felicia on speakerphone during visit -- agreed with plan.    23 Per clarification with Dr Bernard -- safe to use opioids.    O: VSS AFEB 114# (23). A&O NAD. Chest cta. heart rrr. Ext no c/c/e. TTP lumbosacral spine.    LAB (23) Hgb 9.6-> 7.9, Alb 2.8, Cr 2.08-> 2.46, BUN 31, GFR 22->18, K 5.2, Na 134->137  (23) Urine Electrophoresis -- No M Protein. Urine osmolality 513.  (23) UA NEG.    A/P:  # Left knee OA: Start topical blue emu. offer CSI 10/2/23.  # Hyponatremia: NCl 1gm daily. Monitor levels. Increase FR to 1.5L due to volume contraction/Inc Cr.  # L5 & Sacral Ala fx: pain control inadequate with tylenol. Pt refuses oxycodone. Only took tramadol twice in past 4 days -- tolerated well. Will make tramadol 50 mg routine 1 hour prior to PT and 1 hour prior to HS. SNF PT/OT. F/U with ortho. Lidoderm patch.  # HTN: stable on norvasc 10, Coreg 12.5 bid.  # Urinary obstruction: 200+ PVR. LUCIANO placed 23. Removed in hospital -- voiding well.  # HLD: statin, ASA  # B12 deficiency anemia: B12 1mg IM weekly x 8.  # CKD4: monitor labs. F/U Nephro [place]. NO HD.  # Insomnia: melatonin ineffective. Patient declines ambien. Will work on pain control at  to help her sleep.

## 2023-09-28 NOTE — LETTER
Patient: Camelia Jones  : 1934    Encounter Date: 2023    Resident seen 23 -- MP    CC: SNF ([facility]) Recheck    : 1934  SNF H&P done 23, 23 (EPIC)  Allergy: Azithro, Codeine, PCN, Cipro, Sulfa, Corn  DNR-CCA, NO HD    S: 89 yo woman with hx of HLD, CKD4, HTN, and pain limiting rehab due to displaced fracture of L5 and sacral ala. Recent severe hyponatremia (111)-- corrected and back for more rehab. Med List & Problem list reviewed. No cp/sob. C/O insomnia overnight.    23 D/W Dtr Felicia on speakerphone during visit -- agreed with plan.    23 Per clarification with Dr Bernard -- safe to use opioids.    O: VSS AFEB 114# (23). A&O NAD. Chest cta. heart rrr. Ext no c/c/e. TTP lumbosacral spine.    LAB (23) Hgb 9.6-> 7.9, Alb 2.8, Cr 2.08-> 2.46, BUN 31, GFR 22->18, K 5.2, Na 134->137  (23) Urine Electrophoresis -- No M Protein. Urine osmolality 513.  (23) UA NEG.    A/P:  # Left knee OA: Start topical blue emu. offer CSI 10/2/23.  # Hyponatremia: NCl 1gm daily. Monitor levels. Increase FR to 1.5L due to volume contraction/Inc Cr.  # L5 & Sacral Ala fx: pain control inadequate with tylenol. Pt refuses oxycodone. Only took tramadol twice in past 4 days -- tolerated well. Will make tramadol 50 mg routine 1 hour prior to PT and 1 hour prior to HS. SNF PT/OT. F/U with ortho. Lidoderm patch.  # HTN: stable on norvasc 10, Coreg 12.5 bid.  # Urinary obstruction: 200+ PVR. LUCIANO placed 23. Removed in hospital -- voiding well.  # HLD: statin, ASA  # B12 deficiency anemia: B12 1mg IM weekly x 8.  # CKD4: monitor labs. F/U Nephro [place]. NO HD.  # Insomnia: melatonin ineffective. Patient declines ambien. Will work on pain control at  to help her sleep.      Electronically Signed By: Trav Rodriguez MD   23  6:20 PM   independent

## 2023-10-02 ENCOUNTER — NURSING HOME VISIT (OUTPATIENT)
Dept: POST ACUTE CARE | Facility: EXTERNAL LOCATION | Age: 88
End: 2023-10-02
Payer: MEDICARE

## 2023-10-02 DIAGNOSIS — E87.1 HYPONATREMIA: ICD-10-CM

## 2023-10-02 DIAGNOSIS — I10 PRIMARY HYPERTENSION: ICD-10-CM

## 2023-10-02 PROCEDURE — 99309 SBSQ NF CARE MODERATE MDM 30: CPT | Performed by: FAMILY MEDICINE

## 2023-10-02 NOTE — LETTER
Patient: Camelia Jones  : 1934    Encounter Date: 10/02/2023    Resident seen 10/2/23 -- MP    CC: SNF ([facility]) Recheck    : 1934  SNF H&P done 23, 23 (EPIC)  Allergy: Azithro, Codeine, PCN, Cipro, Sulfa, Corn  DNR-CCA, NO HD    S: 88 yo woman with hx of HLD, CKD4, HTN, and pain limiting rehab due to displaced fracture of L5 and sacral ala, anemia and hyponatremia. Increased swelling and weight gain improved with torsemide 40 mg x 3 days. Med List & Problem list reviewed. No cp/sob. C/O insomnia overnight.    10/2/23 D/W Dtr Felicia present during visit -- provided history & agreed with plan.    23 Per clarification with Dr Bernard -- safe to use opioids.    O: VSS AFEB 114# (23)->130# (23), reweigh pending 10/2/23. A&O NAD. Chest cta. heart rrr. Ext 1+ Edema. TTP lumbosacral spine.    LAB (10/2/23) B12 >2k, Ferritin 1455, Iron 25, TIBC 190 Hgb 7.9->9.2, Alb 2.8, Cr 2.08-> 2.46->2.82, BUN 31, GFR 22->18-  >16, K 5.2->4.7, Na 134->137->128  (23) Urine Electrophoresis -- No M Protein. Urine osmolality 513.  (23) UA NEG.    10/1/23 CXR Left basilar infiltrate.    A/P:  # Left lung atelectasis: no cough, fever, hypoxia. Reinforced need for I.S.  # Left knee OA: improved with topical blue emu. can consider CSI if interested.  # Hyponatremia: NCl 1gm daily. Monitor levels. Increase FR to 1.5L due to volume contraction/Inc Cr.  # LE Edema: worsened since FR increased from 1L to 1.5 L. Relative improvement with Torsemide 40 mg daily x 3 -- cut dose to 20 mg 3 days per week. No sign of CHF. Restart CAYETANO hose.  # L5 & Sacral Ala fx: pain control inadequate with tylenol. Pt refuses oxycodone. Improved pain control with tramadol 50 mg routine 1 hour prior to PT and 1 hour prior to HS. SNF PT/OT. F/U with ortho. Lidoderm patch.  # HTN: stable on norvasc 10, Coreg 12.5 bid.  # Hx Urinary obstruction: 200+ PVR. Interval LUCIANO since removed. Voiding well.  # HLD: statin, ASA  # Anemia:  improving w B12 1mg IM weekly x 8. Iron stores replete. May benefit from EPO if does not climb above 10.  # CKD4: monitor labs. F/U Nephro [place]. NO HD.  # Insomnia: melatonin ineffective. Patient declines Ambien. Will work on pain control at  to help her sleep.      Electronically Signed By: Trav Rodriguez MD   10/7/23  1:35 PM

## 2023-10-05 ENCOUNTER — NURSING HOME VISIT (OUTPATIENT)
Dept: POST ACUTE CARE | Facility: EXTERNAL LOCATION | Age: 88
End: 2023-10-05
Payer: MEDICARE

## 2023-10-05 DIAGNOSIS — N18.9 CHRONIC KIDNEY DISEASE, UNSPECIFIED CKD STAGE: ICD-10-CM

## 2023-10-05 DIAGNOSIS — R11.0 NAUSEA: ICD-10-CM

## 2023-10-05 DIAGNOSIS — K59.00 CONSTIPATION, UNSPECIFIED CONSTIPATION TYPE: ICD-10-CM

## 2023-10-05 DIAGNOSIS — S32.059D CLOSED FRACTURE OF FIFTH LUMBAR VERTEBRA WITH ROUTINE HEALING, UNSPECIFIED FRACTURE MORPHOLOGY, SUBSEQUENT ENCOUNTER: ICD-10-CM

## 2023-10-05 DIAGNOSIS — Z29.89 NEED FOR PROPHYLAXIS AGAINST URINARY TRACT INFECTION: ICD-10-CM

## 2023-10-05 DIAGNOSIS — J30.9 ALLERGIC RHINITIS, UNSPECIFIED SEASONALITY, UNSPECIFIED TRIGGER: ICD-10-CM

## 2023-10-05 DIAGNOSIS — S32.9XXD CLOSED NONDISPLACED FRACTURE OF PELVIS WITH ROUTINE HEALING, UNSPECIFIED PART OF PELVIS, SUBSEQUENT ENCOUNTER: Primary | ICD-10-CM

## 2023-10-05 DIAGNOSIS — E87.1 HYPONATREMIA: ICD-10-CM

## 2023-10-05 DIAGNOSIS — E56.9 VITAMIN DEFICIENCY: ICD-10-CM

## 2023-10-05 DIAGNOSIS — E78.5 DYSLIPIDEMIA: ICD-10-CM

## 2023-10-05 DIAGNOSIS — M17.12 PRIMARY OSTEOARTHRITIS OF LEFT KNEE: ICD-10-CM

## 2023-10-05 DIAGNOSIS — D64.9 ANEMIA, UNSPECIFIED TYPE: ICD-10-CM

## 2023-10-05 DIAGNOSIS — I10 HYPERTENSION, UNSPECIFIED TYPE: ICD-10-CM

## 2023-10-05 PROCEDURE — 99309 SBSQ NF CARE MODERATE MDM 30: CPT | Performed by: NURSE PRACTITIONER

## 2023-10-05 NOTE — PROGRESS NOTES
Resident seen 10/2/23 -- MP    CC: SNF ([facility]) Recheck    : 1934  SNF H&P done 23, 23 (EPIC)  Allergy: Azithro, Codeine, PCN, Cipro, Sulfa, Corn  DNR-CCA, NO HD    S: 89 yo woman with hx of HLD, CKD4, HTN, and pain limiting rehab due to displaced fracture of L5 and sacral ala, anemia and hyponatremia. Increased swelling and weight gain improved with torsemide 40 mg x 3 days. Med List & Problem list reviewed. No cp/sob. C/O insomnia overnight.    10/2/23 D/W Dtr Felicia present during visit -- provided history & agreed with plan.    23 Per clarification with Dr Bernard -- safe to use opioids.    O: VSS AFEB 114# (23)->130# (23), reweigh pending 10/2/23. A&O NAD. Chest cta. heart rrr. Ext 1+ Edema. TTP lumbosacral spine.    LAB (10/2/23) B12 >2k, Ferritin 1455, Iron 25, TIBC 190 Hgb 7.9->9.2, Alb 2.8, Cr 2.08-> 2.46->2.82, BUN 31, GFR 22->18-  >16, K 5.2->4.7, Na 134->137->128  (23) Urine Electrophoresis -- No M Protein. Urine osmolality 513.  (23) UA NEG.    10/1/23 CXR Left basilar infiltrate.    A/P:  # Left lung atelectasis: no cough, fever, hypoxia. Reinforced need for I.S.  # Left knee OA: improved with topical blue emu. can consider CSI if interested.  # Hyponatremia: NCl 1gm daily. Monitor levels. Increase FR to 1.5L due to volume contraction/Inc Cr.  # LE Edema: worsened since FR increased from 1L to 1.5 L. Relative improvement with Torsemide 40 mg daily x 3 -- cut dose to 20 mg 3 days per week. No sign of CHF. Restart CAYETANO hose.  # L5 & Sacral Ala fx: pain control inadequate with tylenol. Pt refuses oxycodone. Improved pain control with tramadol 50 mg routine 1 hour prior to PT and 1 hour prior to HS. SNF PT/OT. F/U with ortho. Lidoderm patch.  # HTN: stable on norvasc 10, Coreg 12.5 bid.  # Hx Urinary obstruction: 200+ PVR. Interval LUCIANO since removed. Voiding well.  # HLD: statin, ASA  # Anemia: improving w B12 1mg IM weekly x 8. Iron stores replete. May benefit from  EPO if does not climb above 10.  # CKD4: monitor labs. F/U Nephro [facility]. NO HD.  # Insomnia: melatonin ineffective. Patient declines Ambien. Will work on pain control at  to help her sleep.

## 2023-10-08 NOTE — PROGRESS NOTES
*Provider Impression*    Patient is a 89 year old female who is seen today for management of multiple medical problems       #Fracture of L5 vertebra & L sacral ala / OA - PT/OT; acetaminophen 500mg q4h PRN, lidocaine patch daily, tramadol 50mg BID, f/u w/ Jaclyn Hicks  #Hyponatremia / CKD - 1500mL fluid restriction, f/u w/ Dr. Bernard, torsemide 20mg T/Th/Sat, NaCl 1gram daily, renal panel on 10/6  (Addendum: start NS @ 75mL/hr w/ stat renal panel on 10/7)  #Nausea / Constipation - zofran 4mg q6h PRN, miralax 17grams daily, check KUB  #Anemia / Vitamin deficiency - vitamin E 400units daily, zinc 50mg daily, calcium 250mg daily, vitamin B12 1mg weekly (on hold), biotin 1mg daily, ferrous sulfate 325mg BID  #HTN / HLD - fish oil 1000mg daily, amlodipine 10mg daily, pravastatin 20mg daily, ASA 81mg daily, carvedilol 12.5mg BID  #UTI prophylaxis - cranberry 600mg daily  #Allergic rhinitis - zyrtec 10mg daily, saline gel BID  #ACP - Full Code  Follow up as needed      *Chief Complaint*     hyponatremia / anemia    *History of Present Illness*    Patient is a 88 y/o female w/ PMH as below who presented to the emergency department after a fall. She was found to have displaced fractures of L5 vertebra and left sacral ala 2/2 fall. She was given tyylenol for pain control d/t codeine allergy. Neurosurgery and orthospine at [facility] were consulted in the ED and they reviewed the images and recommended no acute surgical intervention and to continue with conservative measures. She was also treated for acute hypoxic respiratory failure 2/2 atelectasis and vascular congestion. She was seen by PT/OT who recommended SNF, so she was d/c to [facility].     She was sent to [facility] ED due to severe mildly symptomatic hyponatremia found on recent outpatient labs at her rehab facility. She was noted to have 112 sodium in the ED. CXR bilateral pleural effusions and basilar airspace consolidations.  Urine tests showed UTI and urine tests  suggestive of SIADH. Admitted to ICU for correction of hyponatremia. Pt started on ceftriaxone, fluid restricted to 1L and given multiple boluses of hypertonic saline to improvement of sodium. Urine culture grew  proteus sensitive to ceftriaxone. She was given saline bolus and salt tabs due to worsening sodium  She was followed by Nephrology on board. AM cortisol not low. Sodium trended towards normal with these measures. She did have a fever 9/18 evening while on ceftriaxone. Pt completed course of rocephin from 9/15-9/19. R knee xray for pain done showing severe arthritis and joint effusion with no acute fx. Ortho on board, attributing R knee effusion is due to arthritis and can follow-up with Jaclyn Hicks PA-C as an outpatient to have the right knee aspirated and a steroid injection. She was transferred out of the ICU on 9/20. Patient was stable when seen on the floor. She denies any new or worsening symptoms, she denied any n/v, SOB, cough, or CP. Her sodium was stable at 128 and remained hemodynamically stable for discharge to [facility] on 9/21.      Her labs appreciated as below w/ worsening hyponatremia. She developed some edema, torsemide added in consultation w/ Dr. Bernard.    She is seen sitting up in her room and reports some nausea, going on for at least a week, off and on, no aggravating factors, but relieved by changing position, no emesis, abd pain, f/c, sweats, CP, SOB, cough, back pain is controlled, no numbness, tingling, paresthesais, some constipation, LBM a few days ago, edema same, and no other new c/o presently.     (Addendum: 10/6 - Na down to 121, Cr up to 2.99. Ordered to start NS @ 75mL/hr w/ stat renal panel on 10/7)    [place] - Azithromycin, Codeine, Penicillin, Cipro, Sulfa Antibiotics, Corn   PMH - hypertension, hyperlipidemia, anemia, CKD, DVT, anemia, OA, PVD, CAD,   PSH -  hip surgery, appendectomy, marsupialization of ovarian cyst, HOMERO  FH - Brother had A-fib  SocHx - Never  smoker, No EtOH    *Review of Systems*  All other systems reviewed are negative except as noted in the HPI     *Vital Signs*   Date: 10/5/23  - T: 97.6  P: 73  R: 18  BP: 150/54  SpO2: 94% on RA     *Results / Data*  CBC - Date: 10/2/23  WBC: 5.96  HGB: 9.2  HCT: 28.1  PLT: 291  ;   BMP - Date: 10/2/23  Na: 128  K: 4.7  Cl: 94  CO2: 21  BUN: 32  Cr: 2.82  Glu: 123  Ca: 8.0  ;   LFT - Date: 9/14/23  AST: 21  ALT: 12  ALP: 145  TBili: 0.3  ;   Coags - Date:   INR:   PT:  Other - Date: 9/14/23  Iron: 34  TIBC: 186  B12: >2000  Folate: >20.0  Ferritin: >2000  Retic: 2.5%; 10/2/23  Iron: 25  TIBC: 190  Ferritin: 1455  B12: >2000    *Physical Exam*  Gen: (+) NAD, (+) well-appearing  HEENT: (+) normocephalic, (+) MMM  Neck: (+) supple  Lungs: (+) CTAB, (-) wheezes, (-) rales, (-) rhonchi  Heart: (+) RRR, (+) S1 S2, (-) murmurs   Pulses: (+) palpable  Abd: (+) soft, (+) NT, (+) ND, (+) BS+  Ext: (-) edema, (-) deformity  MSK: (-) joint swelling  Skin: (+) warm, (+) dry, (-) rash  Neuro: (+) follows commands, (-) tremor, (+) alert

## 2023-10-09 ENCOUNTER — NURSING HOME VISIT (OUTPATIENT)
Dept: POST ACUTE CARE | Facility: EXTERNAL LOCATION | Age: 88
End: 2023-10-09
Payer: MEDICARE

## 2023-10-09 DIAGNOSIS — S32.2XXD CLOSED FRACTURE OF SACRUM AND COCCYX WITH ROUTINE HEALING, SUBSEQUENT ENCOUNTER: ICD-10-CM

## 2023-10-09 DIAGNOSIS — E87.1 HYPONATREMIA: ICD-10-CM

## 2023-10-09 DIAGNOSIS — S32.10XD CLOSED FRACTURE OF SACRUM AND COCCYX WITH ROUTINE HEALING, SUBSEQUENT ENCOUNTER: ICD-10-CM

## 2023-10-09 PROCEDURE — 99309 SBSQ NF CARE MODERATE MDM 30: CPT | Performed by: FAMILY MEDICINE

## 2023-10-09 NOTE — LETTER
Patient: Camelia Jones  : 1934    Encounter Date: 10/09/2023    Resident seen 10/9/23 -- MP    CC: SNF ([facility]) Recheck    : 1934  SNF H&P done 23, 23 (EPIC)  Allergy: Azithro, Codeine, PCN, Cipro, Sulfa, Corn  DNR-CCA, NO HD    S: 88 yo woman with hx of HLD, CKD4, HTN, and pain limiting rehab due to displaced fracture of L5 and sacral ala, anemia and hyponatremia. Med List & Problem list reviewed. No cp/sob.    O: VSS AFEB 130# (stable). A&O NAD. Chest cta. heart rrr. Ext 1+ Edema. TTP lumbosacral spine.    LAB (10/9/23) Cr 2.82->2.58, BUN 31->35, GFR 17, K 4.6, Na 128->134  (10/2/23) B12 >2k, Ferritin 1455, Iron 25, TIBC 190 Hgb 7.9->9.2, Alb 2.8,  (23) Urine Electrophoresis -- No M Protein. Urine osmolality 513.  (23) UA NEG.    10/1/23 CXR Left basilar infiltrate.    A/P:  # Left lung atelectasis: no cough, fever, hypoxia. Reinforced need for I.S.  # Left knee OA: improved with topical blue emu. can consider CSI if interested.  # Hyponatremia: NCl 1gm daily. Monitor levels. Improved with return to 1L FR.  # LE Edema: stable on torsemide 20 mg 3 days per week. No sign of CHF. CAYETANO hose.  # L5 & Sacral Ala fx: pain control inadequate with tylenol. Pt refuses oxycodone. Improved pain control with tramadol 50 mg routine 1 hour prior to PT and 1 hour prior to HS. SNF PT/OT. F/U with ortho. Lidoderm patch.  # HTN: stable on norvasc 10, Coreg 12.5 bid.  # Hx Urinary obstruction: 200+ PVR. Interval LUCIANO since removed. Voiding well.  # HLD: statin, ASA  # Anemia: improving w B12 1mg IM weekly x 8. Iron stores replete. May benefit from EPO if does not climb above 10.  # CKD4: monitor labs. F/U Nephro [facility]. NO HD.  # Insomnia: melatonin ineffective. Patient declines Ambien. Improved with routine tramadol.      Electronically Signed By: Trav Rodriguez MD   10/21/23  2:02 PM

## 2023-10-16 ENCOUNTER — NURSING HOME VISIT (OUTPATIENT)
Dept: POST ACUTE CARE | Facility: EXTERNAL LOCATION | Age: 88
End: 2023-10-16
Payer: MEDICARE

## 2023-10-16 DIAGNOSIS — E87.1 HYPONATREMIA: ICD-10-CM

## 2023-10-16 DIAGNOSIS — G47.00 INSOMNIA, UNSPECIFIED TYPE: ICD-10-CM

## 2023-10-16 PROCEDURE — 99309 SBSQ NF CARE MODERATE MDM 30: CPT | Performed by: FAMILY MEDICINE

## 2023-10-16 NOTE — LETTER
"Patient: Camelia Jones  : 1934    Encounter Date: 10/16/2023    Resident seen 10/16/23 -- MP    CC: LTC ([facility]) Recheck    : 1934  SNF H&P done 23, 23 (EPIC)  Allergy: Azithro, Codeine, PCN, Cipro, Sulfa, Corn  DNR-CCA, NO HD    S: 89 yo woman with hx of HLD, CKD4, HTN, and pain limiting rehab due to displaced fracture of L5 and sacral ala, anemia and hyponatremia. Med List & Problem list reviewed. C/O insomnia since stopped her tramadol. Had \"confusion\" while on tramadol. No cp/sob.    O: VSS AFEB 129# (down 1#). A&O NAD. Chest cta. heart rrr. Ext 1+ Edema. TTP lumbosacral spine.    LAB (23) Recheck BMP pending.  (10/16/23) Cr 2.82->2.58->2.16, BUN 31->35->45, GFR 17->21, K 4.6, Na 128->134->143  (10/2/23) B12 >2k, Ferritin 1455, Iron 25, TIBC 190 Hgb 7.9->9.2, Alb 2.8,  (23) Urine Electrophoresis -- No M Protein. Urine osmolality 513.  (23) UA NEG.    10/1/23 CXR Left basilar infiltrate.    A/P:  # Left lung atelectasis: no cough, fever, hypoxia. Reinforced need for I.S.  # Left knee OA: improved with topical blue emu. can consider CSI if interested.  # Hyponatremia: NCl 1gm daily. Monitor levels. Improved with return to 1L FR.  # LE Edema: stable on torsemide 20 mg 3 days per week. No sign of CHF. CAYETANO hose.  # L5 & Sacral Ala fx: pain control inadequate with tylenol. Pt refuses oxycodone. Pain stable even OFF tramadol. Completed SNF Rehab. LTC. F/U with ortho. Lidoderm patch.  # HTN: stable on norvasc 10, Coreg 12.5 bid.  # HLD: statin, ASA  # Anemia: improving w B12 1mg IM weekly x 8. Iron stores replete. May benefit from EPO if does not climb above 10.  # CKD4: monitor labs. F/U Nephro [facility]. NO HD.  # Insomnia: Patient declines Ambien, melatonin and tramadol. Offer herbal tea.      Electronically Signed By: Trav Rodriguez MD   10/21/23  2:03 PM  "

## 2023-10-19 ENCOUNTER — NURSING HOME VISIT (OUTPATIENT)
Dept: POST ACUTE CARE | Facility: EXTERNAL LOCATION | Age: 88
End: 2023-10-19
Payer: MEDICARE

## 2023-10-19 DIAGNOSIS — N18.9 CHRONIC KIDNEY DISEASE, UNSPECIFIED CKD STAGE: ICD-10-CM

## 2023-10-19 DIAGNOSIS — D64.9 ANEMIA, UNSPECIFIED TYPE: ICD-10-CM

## 2023-10-19 DIAGNOSIS — S32.10XD CLOSED FRACTURE OF SACRUM AND COCCYX WITH ROUTINE HEALING, SUBSEQUENT ENCOUNTER: ICD-10-CM

## 2023-10-19 DIAGNOSIS — S32.2XXD CLOSED FRACTURE OF SACRUM AND COCCYX WITH ROUTINE HEALING, SUBSEQUENT ENCOUNTER: ICD-10-CM

## 2023-10-19 DIAGNOSIS — I10 HYPERTENSION, UNSPECIFIED TYPE: ICD-10-CM

## 2023-10-19 DIAGNOSIS — R13.10 DYSPHAGIA, UNSPECIFIED TYPE: Primary | ICD-10-CM

## 2023-10-19 DIAGNOSIS — Z29.89 NEED FOR PROPHYLAXIS AGAINST URINARY TRACT INFECTION: ICD-10-CM

## 2023-10-19 DIAGNOSIS — M17.12 PRIMARY OSTEOARTHRITIS OF LEFT KNEE: ICD-10-CM

## 2023-10-19 DIAGNOSIS — S32.059D CLOSED FRACTURE OF FIFTH LUMBAR VERTEBRA WITH ROUTINE HEALING, UNSPECIFIED FRACTURE MORPHOLOGY, SUBSEQUENT ENCOUNTER: ICD-10-CM

## 2023-10-19 DIAGNOSIS — E56.9 VITAMIN DEFICIENCY: ICD-10-CM

## 2023-10-19 DIAGNOSIS — S32.9XXD CLOSED NONDISPLACED FRACTURE OF PELVIS WITH ROUTINE HEALING, UNSPECIFIED PART OF PELVIS, SUBSEQUENT ENCOUNTER: ICD-10-CM

## 2023-10-19 DIAGNOSIS — E78.5 DYSLIPIDEMIA: ICD-10-CM

## 2023-10-19 DIAGNOSIS — E87.1 HYPONATREMIA: ICD-10-CM

## 2023-10-19 DIAGNOSIS — R11.0 NAUSEA: ICD-10-CM

## 2023-10-19 DIAGNOSIS — K59.00 CONSTIPATION, UNSPECIFIED CONSTIPATION TYPE: ICD-10-CM

## 2023-10-19 DIAGNOSIS — J30.9 ALLERGIC RHINITIS, UNSPECIFIED SEASONALITY, UNSPECIFIED TRIGGER: ICD-10-CM

## 2023-10-19 PROCEDURE — 99309 SBSQ NF CARE MODERATE MDM 30: CPT | Performed by: NURSE PRACTITIONER

## 2023-10-19 NOTE — PROGRESS NOTES
"Resident seen 10/16/23 -- MP    CC: LTC ([facility]) Recheck    : 1934  SNF H&P done 23, 23 (EPIC)  Allergy: Azithro, Codeine, PCN, Cipro, Sulfa, Corn  DNR-CCA, NO HD    S: 88 yo woman with hx of HLD, CKD4, HTN, and pain limiting rehab due to displaced fracture of L5 and sacral ala, anemia and hyponatremia. Med List & Problem list reviewed. C/O insomnia since stopped her tramadol. Had \"confusion\" while on tramadol. No cp/sob.    O: VSS AFEB 129# (down 1#). A&O NAD. Chest cta. heart rrr. Ext 1+ Edema. TTP lumbosacral spine.    LAB (23) Recheck BMP pending.  (10/16/23) Cr 2.82->2.58->2.16, BUN 31->35->45, GFR 17->21, K 4.6, Na 128->134->143  (10/2/23) B12 >2k, Ferritin 1455, Iron 25, TIBC 190 Hgb 7.9->9.2, Alb 2.8,  (23) Urine Electrophoresis -- No M Protein. Urine osmolality 513.  (23) UA NEG.    10/1/23 CXR Left basilar infiltrate.    A/P:  # Left lung atelectasis: no cough, fever, hypoxia. Reinforced need for I.S.  # Left knee OA: improved with topical blue emu. can consider CSI if interested.  # Hyponatremia: NCl 1gm daily. Monitor levels. Improved with return to 1L FR.  # LE Edema: stable on torsemide 20 mg 3 days per week. No sign of CHF. CAYETANO hose.  # L5 & Sacral Ala fx: pain control inadequate with tylenol. Pt refuses oxycodone. Pain stable even OFF tramadol. Completed SNF Rehab. LTC. F/U with ortho. Lidoderm patch.  # HTN: stable on norvasc 10, Coreg 12.5 bid.  # HLD: statin, ASA  # Anemia: improving w B12 1mg IM weekly x 8. Iron stores replete. May benefit from EPO if does not climb above 10.  # CKD4: monitor labs. F/U [facility]. NO HD.  # Insomnia: Patient declines Ambien, melatonin and tramadol. Offer herbal tea.  "

## 2023-10-19 NOTE — PROGRESS NOTES
Resident seen 10/9/23 -- MP    CC: [facility] ([facility]) Recheck    : 1934  SNF H&P done 23, 23 (EPIC)  Allergy: Azithro, Codeine, PCN, Cipro, Sulfa, Corn  DNR-CCA, NO HD    S: 89 yo woman with hx of HLD, CKD4, HTN, and pain limiting rehab due to displaced fracture of L5 and sacral ala, anemia and hyponatremia. Med List & Problem list reviewed. No cp/sob.    O: VSS AFEB 130# (stable). A&O NAD. Chest cta. heart rrr. Ext 1+ Edema. TTP lumbosacral spine.    LAB (10/9/23) Cr 2.82->2.58, BUN 31->35, GFR 17, K 4.6, Na 128->134  (10/2/23) B12 >2k, Ferritin 1455, Iron 25, TIBC 190 Hgb 7.9->9.2, Alb 2.8,  (23) Urine Electrophoresis -- No M Protein. Urine osmolality 513.  (23) UA NEG.    10/1/23 CXR Left basilar infiltrate.    A/P:  # Left lung atelectasis: no cough, fever, hypoxia. Reinforced need for I.S.  # Left knee OA: improved with topical blue emu. can consider CSI if interested.  # Hyponatremia: NCl 1gm daily. Monitor levels. Improved with return to 1L FR.  # LE Edema: stable on torsemide 20 mg 3 days per week. No sign of CHF. CAYETANO hose.  # L5 & Sacral Ala fx: pain control inadequate with tylenol. Pt refuses oxycodone. Improved pain control with tramadol 50 mg routine 1 hour prior to PT and 1 hour prior to HS. [facility] PT/OT. F/U with ortho. Lidoderm patch.  # HTN: stable on norvasc 10, Coreg 12.5 bid.  # Hx Urinary obstruction: 200+ PVR. Interval LUCIANO since removed. Voiding well.  # HLD: statin, ASA  # Anemia: improving w B12 1mg IM weekly x 8. Iron stores replete. May benefit from EPO if does not climb above 10.  # CKD4: monitor labs. F/U [facility]. NO HD.  # Insomnia: melatonin ineffective. Patient declines Ambien. Improved with routine tramadol.

## 2023-10-21 NOTE — PROGRESS NOTES
*Provider Impression*    Patient is a 89 year old female who is seen today for management of multiple medical problems       #Dysphagia - consult SLP  #Nausea / Constipation - zofran 4mg q6h PRN, miralax 17grams daily  #Fracture of L5 vertebra & L sacral ala / OA - acetaminophen 500mg q4h PRN, lidocaine patch daily, tramadol 50mg BID, turmeric 1000mg BID, BlueEmu BID and BID PRN, f/u w/ Jaclyn Hikcs  #Hyponatremia / CKD - 1000mL fluid restriction, f/u w/ Dr. Bernard, torsemide 20mg T/Th/Sat, NaCl 1gram BID, renal support drink daily  #Anemia / Vitamin deficiency - vitamin E 400units daily, zinc 50mg daily, calcium 250mg daily, vitamin B12 1mg weekly, biotin 1mg daily, ferrous sulfate 325mg BID  #HTN / HLD - fish oil 1000mg daily, amlodipine 10mg daily, pravastatin 20mg daily, ASA 81mg daily, carvedilol 12.5mg BID  #UTI prophylaxis - cranberry 600mg daily  #Allergic rhinitis - zyrtec 10mg daily, saline gel BID  #ACP - Full Code  Follow up as needed      *Chief Complaint*     dysphagia    *History of Present Illness*    Patient is a 89 y/o female w/ PMH as below who presented to the emergency department after a fall. She was found to have displaced fractures of L5 vertebra and left sacral ala 2/2 fall. She was given tyylenol for pain control d/t codeine allergy. Neurosurgery and orthospine at [facility] were consulted in the ED and they reviewed the images and recommended no acute surgical intervention and to continue with conservative measures. She was also treated for acute hypoxic respiratory failure 2/2 atelectasis and vascular congestion. She was seen by PT/OT who recommended SNF, so she was d/c to [facility].     She was sent to [facility] ED due to severe mildly symptomatic hyponatremia found on recent outpatient labs at her rehab facility. She was noted to have 112 sodium in the ED. CXR bilateral pleural effusions and basilar airspace consolidations.  Urine tests showed UTI and urine tests suggestive of SIADH.  "Admitted to ICU for correction of hyponatremia. Pt started on ceftriaxone, fluid restricted to 1L and given multiple boluses of hypertonic saline to improvement of sodium. Urine culture grew  proteus sensitive to ceftriaxone. She was given saline bolus and salt tabs due to worsening sodium  She was followed by Nephrology on board. AM cortisol not low. Sodium trended towards normal with these measures. She did have a fever 9/18 evening while on ceftriaxone. Pt completed course of rocephin from 9/15-9/19. R knee xray for pain done showing severe arthritis and joint effusion with no acute fx. Ortho on board, attributing R knee effusion is due to arthritis and can follow-up with Jaclyn Hicks PA-C as an outpatient to have the right knee aspirated and a steroid injection. She was transferred out of the ICU on 9/20. Patient was stable when seen on the floor. She denies any new or worsening symptoms, she denied any n/v, SOB, cough, or CP. Her sodium was stable at 128 and remained hemodynamically stable for discharge to [facility] on 9/21.      She was started on renal support drink, Nacl BID, blue emu and turmeric added.  Nursing staff report she c/o some indigestion.    She is seen sitting up in her room and reports \"something up in my throat that is blocking so I can't swallow.\" She reports Tums helps, no cough, CP, SOB, some nausea, no emesis, no other new c/o presently.     [place] - Azithromycin, Codeine, Penicillin, Cipro, Sulfa Antibiotics, Corn   PMH - hypertension, hyperlipidemia, anemia, CKD, DVT, anemia, OA, PVD, CAD,   PSH -  hip surgery, appendectomy, marsupialization of ovarian cyst, HOMERO  FH - Brother had A-fib  SocHx - Never smoker, No EtOH    *Review of Systems*  All other systems reviewed are negative except as noted in the HPI     *Vital Signs*   Date: 10/9/23  - T: 97.4  P: 88  R: 18  BP: 125/80  SpO2: % on RA     *Results / Data*  CBC - Date: 10/2/23  WBC: 5.96  HGB: 9.2  HCT: 28.1  PLT: 291  ;   BMP " - Date: 10/16/23  Na: 143  K: 4.6  Cl: 104  CO2: 26  BUN: 46  Cr: 2.16  Glu: 90  Ca: 8.6  ;   LFT - Date: 9/14/23  AST: 21  ALT: 12  ALP: 145  TBili: 0.3  ;   Coags - Date:   INR:   PT:  Other - Date: 9/14/23  Iron: 34  TIBC: 186  B12: >2000  Folate: >20.0  Ferritin: >2000  Retic: 2.5%; 10/2/23  Iron: 25  TIBC: 190  Ferritin: 1455  B12: >2000    *Physical Exam*  Gen: (+) NAD, (+) well-appearing  HEENT: (+) normocephalic, (+) MMM  Neck: (+) supple  Lungs: (+) CTAB, (-) wheezes, (-) rales, (-) rhonchi  Heart: (+) RRR, (+) S1 S2, (-) murmurs   Pulses: (+) palpable  Abd: (+) soft, (+) NT, (+) ND, (+) BS+  Ext: (-) edema, (-) deformity  MSK: (-) joint swelling  Skin: (+) warm, (+) dry, (-) rash  Neuro: (+) follows commands, (-) tremor, (+) alert

## 2023-11-02 ENCOUNTER — NURSING HOME VISIT (OUTPATIENT)
Dept: POST ACUTE CARE | Facility: EXTERNAL LOCATION | Age: 88
End: 2023-11-02
Payer: MEDICARE

## 2023-11-02 DIAGNOSIS — K59.00 CONSTIPATION, UNSPECIFIED CONSTIPATION TYPE: ICD-10-CM

## 2023-11-02 DIAGNOSIS — N18.4 CKD (CHRONIC KIDNEY DISEASE) STAGE 4, GFR 15-29 ML/MIN (MULTI): ICD-10-CM

## 2023-11-02 PROCEDURE — 99309 SBSQ NF CARE MODERATE MDM 30: CPT | Performed by: FAMILY MEDICINE

## 2023-11-02 NOTE — PROGRESS NOTES
Resident seen 23 -- MP    CC: LTC ([facility]) Recheck    : 1934  SNF H&P done 23, 23 (EPIC)  Allergy: Azithro, Codeine, PCN, Cipro, Sulfa, Corn  DNR-CCA, NO HD    S: 89 yo woman with hx of HLD, CKD4, HTN, recent displaced fracture of L5 and sacral ala, anemia and hyponatremia. Med List & Problem list reviewed. C/O constipation. No cp/sob.    O: VSS AFEB 120# (down 9# from last month -- between 120-122# for past week). A&O NAD. Chest cta. heart rrr. Ext 1+ Edema. TTP lumbosacral spine.    LAB (23) bmp, cbc, iron, ferritin pending.  (23) Cr 2.09, BUN 31->35->45, GFR 22, K 3.9, Na 128->134->143->148  (10/2/23) B12 >2k, Ferritin 1455, Iron 25, TIBC 190 Hgb 7.9->9.2, Alb 2.8,  (23) Urine Electrophoresis -- No M Protein. Urine osmolality 513.  (23) UA NEG.    10/1/23 CXR Left basilar infiltrate.    A/P:  # Constipation: stop tums, Ca, Fe, continue colace, add miralax.  # Hx Left lung atelectasis: no cough, fever, hypoxia. improved with I.S.  # Left knee OA: improved with topical blue emu. can consider CSI if interested.  # Hyponatremia: increasing with NCl 1gm daily. Monitor levels. Improved with return to 1L FR -- on hold 23 due to constipation.  # LE Edema: stable on torsemide 20 mg 3 days per week. No sign of CHF. CAYETANO hose. Suspect chronic lymphedema as present even with significant weight loss.  # L5 & Sacral Ala fx: pain control inadequate with tylenol. Pt refuses oxycodone. Pain stable even OFF tramadol. Completed SNF Rehab. LTC. F/U with ortho. Lidoderm patch.  # HTN: up despite norvasc 10, Coreg 12.5 bid.  # HLD: statin, ASA  # Anemia: improving w B12 1mg IM weekly x 8. Iron stores replete. DC ferrous sulfate 23 to help with constaipation. May benefit from EPO if does not climb above 10.  # CKD4: monitor labs. F/U Ne[facility]. NO HD.  # Insomnia: Patient declines Ambien, melatonin and tramadol. Offer herbal tea.

## 2023-11-02 NOTE — LETTER
Patient: Camelia Jones  : 1934    Encounter Date: 2023    Resident seen 23 -- MP    CC: [facility] ([facility]) Recheck    : 1934  SNF H&P done 23, 23 (EPIC)  Allergy: Azithro, Codeine, PCN, Cipro, Sulfa, Corn  DNR-CCA, NO HD    S: 88 yo woman with hx of HLD, CKD4, HTN, recent displaced fracture of L5 and sacral ala, anemia and hyponatremia. Med List & Problem list reviewed. C/O constipation. No cp/sob.    O: VSS AFEB 120# (down 9# from last month -- between 120-122# for past week). A&O NAD. Chest cta. heart rrr. Ext 1+ Edema. TTP lumbosacral spine.    LAB (23) bmp, cbc, iron, ferritin pending.  (23) Cr 2.09, BUN 31->35->45, GFR 22, K 3.9, Na 128->134->143->148  (10/2/23) B12 >2k, Ferritin 1455, Iron 25, TIBC 190 Hgb 7.9->9.2, Alb 2.8,  (23) Urine Electrophoresis -- No M Protein. Urine osmolality 513.  (23) UA NEG.    10/1/23 CXR Left basilar infiltrate.    A/P:  # Constipation: stop tums, Ca, Fe, continue colace, add miralax.  # Hx Left lung atelectasis: no cough, fever, hypoxia. improved with I.S.  # Left knee OA: improved with topical blue emu. can consider CSI if interested.  # Hyponatremia: increasing with NCl 1gm daily. Monitor levels. Improved with return to 1L FR -- on hold 23 due to constipation.  # LE Edema: stable on torsemide 20 mg 3 days per week. No sign of CHF. CAYETANO hose. Suspect chronic lymphedema as present even with significant weight loss.  # L5 & Sacral Ala fx: pain control inadequate with tylenol. Pt refuses oxycodone. Pain stable even OFF tramadol. Completed SNF Rehab. LTC. F/U with ortho. Lidoderm patch.  # HTN: up despite norvasc 10, Coreg 12.5 bid.  # HLD: statin, ASA  # Anemia: improving w B12 1mg IM weekly x 8. Iron stores replete. DC ferrous sulfate 23 to help with constaipation. May benefit from EPO if does not climb above 10.  # CKD4: monitor labs. F/U Nephro [facility]. NO HD.  # Insomnia: Patient declines Ambien, melatonin and  tramadol. Offer herbal tea.      Electronically Signed By: Trav Rodriguez MD   11/5/23  8:23 PM

## 2023-11-16 ENCOUNTER — NURSING HOME VISIT (OUTPATIENT)
Dept: POST ACUTE CARE | Facility: EXTERNAL LOCATION | Age: 88
End: 2023-11-16
Payer: MEDICARE

## 2023-11-16 DIAGNOSIS — Z29.89 NEED FOR PROPHYLAXIS AGAINST URINARY TRACT INFECTION: ICD-10-CM

## 2023-11-16 DIAGNOSIS — S32.059D CLOSED FRACTURE OF FIFTH LUMBAR VERTEBRA WITH ROUTINE HEALING, UNSPECIFIED FRACTURE MORPHOLOGY, SUBSEQUENT ENCOUNTER: ICD-10-CM

## 2023-11-16 DIAGNOSIS — R13.10 DYSPHAGIA, UNSPECIFIED TYPE: ICD-10-CM

## 2023-11-16 DIAGNOSIS — I10 HYPERTENSION, UNSPECIFIED TYPE: ICD-10-CM

## 2023-11-16 DIAGNOSIS — J30.9 ALLERGIC RHINITIS, UNSPECIFIED SEASONALITY, UNSPECIFIED TRIGGER: ICD-10-CM

## 2023-11-16 DIAGNOSIS — S32.10XD CLOSED FRACTURE OF SACRUM AND COCCYX WITH ROUTINE HEALING, SUBSEQUENT ENCOUNTER: ICD-10-CM

## 2023-11-16 DIAGNOSIS — D64.9 ANEMIA, UNSPECIFIED TYPE: ICD-10-CM

## 2023-11-16 DIAGNOSIS — R11.0 NAUSEA: ICD-10-CM

## 2023-11-16 DIAGNOSIS — M17.12 PRIMARY OSTEOARTHRITIS OF LEFT KNEE: ICD-10-CM

## 2023-11-16 DIAGNOSIS — S32.2XXD CLOSED FRACTURE OF SACRUM AND COCCYX WITH ROUTINE HEALING, SUBSEQUENT ENCOUNTER: ICD-10-CM

## 2023-11-16 DIAGNOSIS — E78.5 DYSLIPIDEMIA: ICD-10-CM

## 2023-11-16 DIAGNOSIS — E87.1 HYPONATREMIA: ICD-10-CM

## 2023-11-16 DIAGNOSIS — E56.9 VITAMIN DEFICIENCY: ICD-10-CM

## 2023-11-16 DIAGNOSIS — N18.9 CHRONIC KIDNEY DISEASE, UNSPECIFIED CKD STAGE: ICD-10-CM

## 2023-11-16 DIAGNOSIS — K59.00 CONSTIPATION, UNSPECIFIED CONSTIPATION TYPE: Primary | ICD-10-CM

## 2023-11-16 PROCEDURE — 99308 SBSQ NF CARE LOW MDM 20: CPT | Performed by: NURSE PRACTITIONER

## 2023-11-20 ENCOUNTER — NURSING HOME VISIT (OUTPATIENT)
Dept: POST ACUTE CARE | Facility: EXTERNAL LOCATION | Age: 88
End: 2023-11-20
Payer: MEDICARE

## 2023-11-20 DIAGNOSIS — S32.010A CLOSED COMPRESSION FRACTURE OF BODY OF L1 VERTEBRA (MULTI): ICD-10-CM

## 2023-11-20 DIAGNOSIS — N18.4 CKD (CHRONIC KIDNEY DISEASE) STAGE 4, GFR 15-29 ML/MIN (MULTI): ICD-10-CM

## 2023-11-20 DIAGNOSIS — D64.9 ANEMIA, UNSPECIFIED TYPE: ICD-10-CM

## 2023-11-20 PROCEDURE — 99309 SBSQ NF CARE MODERATE MDM 30: CPT | Performed by: FAMILY MEDICINE

## 2023-11-20 NOTE — LETTER
Patient: Camelia Jones  : 1934    Encounter Date: 2023    Resident seen 23 -- MP    CC: LTC ([facility]) Recheck requested by patient.    : 1934  SNF H&P done 23, 23 (EPIC)  Allergy: Azithro, Codeine, PCN, Cipro, Sulfa, Corn  DNR-CCA, NO HD    S: 88 yo woman with hx of HLD, CKD4, HTN, recent displaced fracture of L5 and sacral ala, anemia and hyponatremia. Med List & Problem list reviewed. C/O pain and insomnia -- refuses tramadol or other sleeping medicines. No cp/sob.    O: VSS AFEB 122# (up 2#). A&O NAD. Chest cta. heart rrr. Ext 1+ Edema. TTP lumbosacral spine.    LAB (23) CBC/CMP pending  (11/10/23) Cr 2.31, BUN 50, GFR 20, K 3.9, Na 141  (23) Ferritin 1165, Iron 25->31, TIBC 190 Hgb 7.9->9.2->8.2, Alb 2.8->3.2,    (23) Urine Electrophoresis -- No M Protein. Urine osmolality 513.  (23) UA NEG.    10/1/23 CXR Left basilar infiltrate.    A/P:  # Left knee OA: improved with topical blue emu. can consider CSI if interested.  # Hyponatremia: increasing with NCl 1gm daily. Monitor levels. Improved with return to 1L FR -- on hold 23 due to constipation.  # LE Edema: stable on torsemide 20 mg 3 days per week. No sign of CHF. CAYETANO hose. Suspect chronic lymphedema as present even with significant weight loss.  # L5 & Sacral Ala fx: pain control inadequate with tylenol. Pt refuses oxycodone. Pain stable even OFF tramadol. Completed SNF Rehab. LTC. F/U with ortho. Lidoderm patch.  # Depression and Chronic pain: offer cymbalta 30 mg qam. and psych consult.  # HTN: up despite norvasc 10, Coreg 12.5 bid.  # HLD: statin, ASA  # Anemia: improving w B12 1mg IM weekly x 8. Iron stores replete. DC ferrous sulfate 23 to help with constipation. May benefit from EPO if does not climb above 10.  # CKD4: monitor labs. F/U Nephro [place]. NO HD.  # Insomnia: Patient declines Ambien, melatonin and tramadol. Offer herbal tea.  # Hx Constipation: stopped tums, Ca, Fe, continue  colace, added miralax.  # Hx Left lung atelectasis: no cough, fever, hypoxia. improved with I.S.      Electronically Signed By: Trav Rodriguez MD   11/24/23 11:58 AM

## 2023-11-21 PROBLEM — D64.9 ANEMIA: Status: ACTIVE | Noted: 2023-11-21

## 2023-11-21 PROBLEM — S32.010A CLOSED COMPRESSION FRACTURE OF BODY OF L1 VERTEBRA (MULTI): Status: ACTIVE | Noted: 2023-11-21

## 2023-11-21 NOTE — PROGRESS NOTES
Resident seen 23 -- MP    CC: [facility] ([facility]) Recheck requested by patient.    : 1934  SNF H&P done 23, 23 (EPIC)  Allergy: Azithro, Codeine, PCN, Cipro, Sulfa, Corn  DNR-CCA, NO HD    S: 88 yo woman with hx of HLD, CKD4, HTN, recent displaced fracture of L5 and sacral ala, anemia and hyponatremia. Med List & Problem list reviewed. C/O pain and insomnia -- refuses tramadol or other sleeping medicines. No cp/sob.    O: VSS AFEB 122# (up 2#). A&O NAD. Chest cta. heart rrr. Ext 1+ Edema. TTP lumbosacral spine.    LAB (23) CBC/CMP pending  (11/10/23) Cr 2.31, BUN 50, GFR 20, K 3.9, Na 141  (23) Ferritin 1165, Iron 25->31, TIBC 190 Hgb 7.9->9.2->8.2, Alb 2.8->3.2,    (23) Urine Electrophoresis -- No M Protein. Urine osmolality 513.  (23) UA NEG.    10/1/23 CXR Left basilar infiltrate.    A/P:  # Left knee OA: improved with topical blue emu. can consider CSI if interested.  # Hyponatremia: increasing with NCl 1gm daily. Monitor levels. Improved with return to 1L FR -- on hold 23 due to constipation.  # LE Edema: stable on torsemide 20 mg 3 days per week. No sign of CHF. CAYETANO hose. Suspect chronic lymphedema as present even with significant weight loss.  # L5 & Sacral Ala fx: pain control inadequate with tylenol. Pt refuses oxycodone. Pain stable even OFF tramadol. Completed SNF Rehab. LTC. F/U with ortho. Lidoderm patch.  # Depression and Chronic pain: offer cymbalta 30 mg qam. and psych consult.  # HTN: up despite norvasc 10, Coreg 12.5 bid.  # HLD: statin, ASA  # Anemia: improving w B12 1mg IM weekly x 8. Iron stores replete. DC ferrous sulfate 23 to help with constipation. May benefit from EPO if does not climb above 10.  # CKD4: monitor labs. F/U Nephro [place]. NO HD.  # Insomnia: Patient declines Ambien, melatonin and tramadol. Offer herbal tea.  # Hx Constipation: stopped tums, Ca, Fe, continue colace, added miralax.  # Hx Left lung atelectasis: no cough, fever,  hypoxia. improved with I.S.

## 2023-11-21 NOTE — PROGRESS NOTES
*Provider Impression*    Patient is a 89 year old female who is seen today for management of multiple medical problems       #Constipation / Nausea / Dysphagia - consult SLP, zofran 4mg q6h PRN, miralax 17grams BID  #Fracture of L5 vertebra & L sacral ala / OA - acetaminophen 500mg q4h PRN, lidocaine patch daily, tramadol 50mg BID, turmeric 1000mg BID, BlueEmu BID and BID PRN, f/u w/ Jaclyn Hicks  #Hyponatremia / CKD - 1000mL fluid restriction, f/u w/ Dr. Bernard, torsemide 20mg daily, NaCl 1gram BID, renal support drink daily  #Anemia / Vitamin deficiency - vitamin E 400units daily, zinc 50mg daily, calcium 250mg daily, vitamin B12 1mg weekly, biotin 1mg daily, ferrous sulfate 325mg BID  #HTN / HLD - fish oil 1000mg daily, amlodipine 10mg daily, pravastatin 20mg daily, ASA 81mg daily, carvedilol 12.5mg BID  #UTI prophylaxis - cranberry 600mg daily  #Allergic rhinitis - zyrtec 10mg daily, saline gel BID  #Insomnia - melatonin 3mg QHS PRN  #ACP - DNRCC-A  Follow up as needed      *Chief Complaint*     constipation    *History of Present Illness*    Patient is a 89 y/o female LTC resident of [facility] w/ [facility] as below who is seen today for f/u and management of multiple medical problems.     Her labs ok. She is requeesting to speak with me today.    Reports her bowels were not moving, now they are moving fine, not sleeping a full 8 hour night, no f/c, sweats, CP, SOB, cough, n/v, no other new c/o presently.     [place] - Azithromycin, Codeine, Penicillin, Cipro, Sulfa Antibiotics, Corn   PMH - hypertension, hyperlipidemia, anemia, CKD, DVT, anemia, OA, PVD, CAD,   PSH -  hip surgery, appendectomy, marsupialization of ovarian cyst, HOMERO  FH - Brother had A-fib  SocHx - Never smoker, No EtOH    *Review of Systems*  All other systems reviewed are negative except as noted in the HPI     *Vital Signs*   Date: 11/8/23  - T: 97.7  P: 85  R: 18  BP: 152/69  SpO2: 97% on RA     *Results / Data*  CBC - Date: 11/8/23  WBC:  5.63  HGB: 8.2  HCT: 26.9  PLT: 272  ;   BMP - Date: 11/10/23  Na: 141  K: 3.9  Cl: 101  CO2: 27  BUN: 50  Cr: 2.31  Glu: 111  Ca: 8.9  ;   LFT - Date: 9/14/23  AST: 21  ALT: 12  ALP: 145  Tbili: 0.3  ;   Coags - Date:   INR:   PT:  Other - Date: 9/14/23  Iron: 34  TIBC: 186  B12: >2000  Folate: >20.0  Ferritin: >2000  Retic: 2.5%; 10/2/23  Iron: 25  TIBC: 190  Ferritin: 1455  B12: >2000; 11/8/23  Ferritin: 1165  Iron: 31    *Physical Exam*  Gen: (+) NAD, (+) well-appearing  HEENT: (+) normocephalic, (+) MMM  Neck: (+) supple  Lungs: (+) CTAB, (-) wheezes, (-) rales, (-) rhonchi  Heart: (+) RRR, (+) S1 S2, (-) murmurs   Pulses: (+) palpable  Abd: (+) soft, (+) NT, (+) ND, (+) BS+  Ext: (-) edema, (-) deformity  MSK: (-) joint swelling  Skin: (+) warm, (+) dry, (-) rash  Neuro: (+) follows commands, (-) tremor, (+) alert

## 2023-12-07 ENCOUNTER — NURSING HOME VISIT (OUTPATIENT)
Dept: POST ACUTE CARE | Facility: EXTERNAL LOCATION | Age: 88
End: 2023-12-07
Payer: MEDICARE

## 2023-12-07 DIAGNOSIS — K59.00 CONSTIPATION, UNSPECIFIED CONSTIPATION TYPE: ICD-10-CM

## 2023-12-07 DIAGNOSIS — D64.9 ANEMIA, UNSPECIFIED TYPE: ICD-10-CM

## 2023-12-07 DIAGNOSIS — N18.4 CKD (CHRONIC KIDNEY DISEASE) STAGE 4, GFR 15-29 ML/MIN (MULTI): ICD-10-CM

## 2023-12-07 PROCEDURE — 99309 SBSQ NF CARE MODERATE MDM 30: CPT | Performed by: FAMILY MEDICINE

## 2023-12-07 NOTE — LETTER
Patient: Camelia Jones  : 1934    Encounter Date: 2023    Resident seen 23 -- MP    CC: LTC ([facility]) Recheck    : 1934  SNF H&P done 23, 23 (EPIC)  Allergy: Azithro, Codeine, PCN, Cipro, Sulfa, Corn  DNR-CCA, NO HD    S: 89 yo woman with hx of HLD, CKD4, HTN, recent displaced fracture of L5 and sacral ala, anemia and hyponatremia. Med List & Problem list reviewed. C/O constipation. No cp/sob.    O: VSS AFEB 119# (down 3#). A&O NAD. Chest cta. heart rrr. Ext 1+ Edema. TTP lumbosacral spine.    LAB (23) CR 2.3->2.63, GFR 20->17,K 3.9, Hgb 8.1, Alb 3.5, Na 141->143  (23) Ferritin 1165, Iron 25->31, TIBC 190    (23) Urine Electrophoresis -- No M Protein. Urine osmolality 513.  (23) UA NEG.    10/1/23 CXR Left basilar infiltrate.    A/P:  # Left knee OA: improved with topical blue emu. can consider CSI if interested.  # Hyponatremia: stable OFF salt tablet. Monitor levels. Improved with return to 1L FR -- increase to 1500 cc FR 23.  # LE Edema: stable on torsemide 20 mg daily. No sign of CHF. CAYETANO hose. Suspect chronic lymphedema as remains present even with significant weight loss.  # L5 & Sacral Ala fx: pain control inadequate with tylenol. Pt refuses oxycodone. Pain stable even OFF tramadol but keeping this available in case of an emergency. Completed SNF Rehab. LTC. F/U with ortho. Lidoderm patch.  # Depression and Chronic pain: patient declines offer of cymbalta 30 mg qam.  # HTN: up despite norvasc 10, Coreg 12.5 bid.  # HLD: statin, ASA  # Anemia: improving w B12 1mg IM weekly x 8. Iron stores replete. OFF ferrous sulfate 23 to help with constipation. May benefit from EPO if does not climb above 10.  # CKD4: monitor labs. F/U Nephro [place]. NO HD.  # Insomnia: Patient declines Ambien, melatonin and tramadol. Offer herbal tea, capsaicin cream.  # Hx Constipation: stopped tums, Ca, Fe, continue colace, increase miralax to BID.  # Hx Left lung  atelectasis: no cough, fever, hypoxia. improved with I.S.      Electronically Signed By: Trav Rodriguez MD   12/17/23  2:56 PM

## 2023-12-13 NOTE — PROGRESS NOTES
Resident seen 23 -- MP    CC: [facility] ([facility]) Recheck    : 1934  SNF H&P done 23, 23 (EPIC)  Allergy: Azithro, Codeine, PCN, Cipro, Sulfa, Corn  DNR-CCA, NO HD    S: 88 yo woman with hx of HLD, CKD4, HTN, recent displaced fracture of L5 and sacral ala, anemia and hyponatremia. Med List & Problem list reviewed. C/O constipation. No cp/sob.    O: VSS AFEB 119# (down 3#). A&O NAD. Chest cta. heart rrr. Ext 1+ Edema. TTP lumbosacral spine.    LAB (23) CR 2.3->2.63, GFR 20->17,K 3.9, Hgb 8.1, Alb 3.5, Na 141->143  (23) Ferritin 1165, Iron 25->31, TIBC 190    (23) Urine Electrophoresis -- No M Protein. Urine osmolality 513.  (23) UA NEG.    10/1/23 CXR Left basilar infiltrate.    A/P:  # Left knee OA: improved with topical blue emu. can consider CSI if interested.  # Hyponatremia: stable OFF salt tablet. Monitor levels. Improved with return to 1L FR -- increase to 1500 cc FR 23.  # LE Edema: stable on torsemide 20 mg daily. No sign of CHF. CAYETANO hose. Suspect chronic lymphedema as remains present even with significant weight loss.  # L5 & Sacral Ala fx: pain control inadequate with tylenol. Pt refuses oxycodone. Pain stable even OFF tramadol but keeping this available in case of an emergency. Completed SNF Rehab. LTC. F/U with ortho. Lidoderm patch.  # Depression and Chronic pain: patient declines offer of cymbalta 30 mg qam.  # HTN: up despite norvasc 10, Coreg 12.5 bid.  # HLD: statin, ASA  # Anemia: improving w B12 1mg IM weekly x 8. Iron stores replete. OFF ferrous sulfate 23 to help with constipation. May benefit from EPO if does not climb above 10.  # CKD4: monitor labs. F/U Nephro [place]. NO HD.  # Insomnia: Patient declines Ambien, melatonin and tramadol. Offer herbal tea, capsaicin cream.  # Hx Constipation: stopped tums, Ca, Fe, continue colace, increase miralax to BID.  # Hx Left lung atelectasis: no cough, fever, hypoxia. improved with I.S.

## 2024-01-04 ENCOUNTER — NURSING HOME VISIT (OUTPATIENT)
Dept: POST ACUTE CARE | Facility: EXTERNAL LOCATION | Age: 89
End: 2024-01-04
Payer: COMMERCIAL

## 2024-01-04 DIAGNOSIS — J32.9 SINUSITIS, UNSPECIFIED CHRONICITY, UNSPECIFIED LOCATION: ICD-10-CM

## 2024-01-04 DIAGNOSIS — E87.1 HYPONATREMIA: ICD-10-CM

## 2024-01-04 PROCEDURE — 99309 SBSQ NF CARE MODERATE MDM 30: CPT | Performed by: FAMILY MEDICINE

## 2024-01-04 NOTE — LETTER
Patient: Camelia Jones  : 1934    Encounter Date: 2024    Resident seen 24 -- MP    CC: [facility] ([facility]) Recheck    : 1934  SNF H&P done 23, 23 (EPIC)  Allergy: Azithro, Codeine, PCN, Cipro, Sulfa, Corn  DNR-CCA, NO HD    S: 89 yo woman with hx of HLD, CKD4, HTN, recent displaced fracture of L5 and sacral ala, anemia and hyponatremia. Med List & Problem list reviewed. C/O sinus HA and cough from PND. No cp/sob.    O: VSS AFEB 119# (stable). A&O NAD. Chest cta. heart rrr. Sinus TTP. Ext 1+ Edema. TTP lumbosacral spine.    LAB (24) BMP PENDING  (23) CR 2.3->2.63, GFR 20->17,K 3.9, Hgb 8.1, Alb 3.5, Na 141->143  (23) Ferritin 1165, Iron 25->31, TIBC 190    (23) Urine Electrophoresis -- No M Protein. Urine osmolality 513.  (23) UA NEG.    10/1/23 CXR Left basilar infiltrate.    A/P:  # Sinusitis: continue mucinex, tessalon. Add clindamycin 300 qid x 7 days for sinusitis.  # Left knee OA: improved with topical blue emu. can consider CSI if interested.  # Hyponatremia: stable OFF salt tablet. Monitor levels. 1500 cc FR 23.  # LE Edema: stable on torsemide 20 mg daily. No sign of CHF. CAYETANO hose. Suspect chronic lymphedema as remains present even with significant weight loss.  # L5 & Sacral Ala fx: pain control inadequate with tylenol. Pt refuses oxycodone. Pain stable even OFF tramadol but keeping this available in case of an emergency. Completed SNF Rehab. LTC. F/U with ortho. Lidoderm patch.  # Depression and Chronic pain: patient declines offer of cymbalta 30 mg qam.  # HTN: up despite norvasc 10, Coreg 12.5 bid.  # HLD: statin, ASA  # Anemia: improving w B12 1mg IM weekly x 8. Iron stores replete. OFF ferrous sulfate 23 to help with constipation. May benefit from EPO if does not climb above 10.  # CKD4: monitor labs. F/U Nephro [place]. NO HD.  # Insomnia: Patient declines Ambien, melatonin and tramadol. Offer herbal tea, capsaicin cream.  # Hx  Constipation: stopped tums, Ca, Fe, continue colace, increase miralax to BID.  # Hx Left lung atelectasis: no cough, fever, hypoxia. improved with I.S.      Electronically Signed By: Trav Rodriguez MD   1/14/24  4:43 PM

## 2024-01-08 PROBLEM — J32.9 SINUSITIS: Status: ACTIVE | Noted: 2024-01-08

## 2024-01-08 NOTE — PROGRESS NOTES
Resident seen 24 -- MP    CC: [facility] ([facility]) Recheck    : 1934  SNF H&P done 23, 23 (EPIC)  Allergy: Azithro, Codeine, PCN, Cipro, Sulfa, Corn  DNR-CCA, NO HD    S: 88 yo woman with hx of HLD, CKD4, HTN, recent displaced fracture of L5 and sacral ala, anemia and hyponatremia. Med List & Problem list reviewed. C/O sinus HA and cough from PND. No cp/sob.    O: VSS AFEB 119# (stable). A&O NAD. Chest cta. heart rrr. Sinus TTP. Ext 1+ Edema. TTP lumbosacral spine.    LAB (24) BMP PENDING  (23) CR 2.3->2.63, GFR 20->17,K 3.9, Hgb 8.1, Alb 3.5, Na 141->143  (23) Ferritin 1165, Iron 25->31, TIBC 190    (23) Urine Electrophoresis -- No M Protein. Urine osmolality 513.  (23) UA NEG.    10/1/23 CXR Left basilar infiltrate.    A/P:  # Sinusitis: continue mucinex, tessalon. Add clindamycin 300 qid x 7 days for sinusitis.  # Left knee OA: improved with topical blue emu. can consider CSI if interested.  # Hyponatremia: stable OFF salt tablet. Monitor levels. 1500 cc FR 23.  # LE Edema: stable on torsemide 20 mg daily. No sign of CHF. CAYETANO hose. Suspect chronic lymphedema as remains present even with significant weight loss.  # L5 & Sacral Ala fx: pain control inadequate with tylenol. Pt refuses oxycodone. Pain stable even OFF tramadol but keeping this available in case of an emergency. Completed SNF Rehab. LTC. F/U with ortho. Lidoderm patch.  # Depression and Chronic pain: patient declines offer of cymbalta 30 mg qam.  # HTN: up despite norvasc 10, Coreg 12.5 bid.  # HLD: statin, ASA  # Anemia: improving w B12 1mg IM weekly x 8. Iron stores replete. OFF ferrous sulfate 23 to help with constipation. May benefit from EPO if does not climb above 10.  # CKD4: monitor labs. F/U Nephro Ros[place]ock. NO HD.  # Insomnia: Patient declines Ambien, melatonin and tramadol. Offer herbal tea, capsaicin cream.  # Hx Constipation: stopped tums, Ca, Fe, continue colace, increase miralax to BID.  # Hx  Left lung atelectasis: no cough, fever, hypoxia. improved with I.S.

## 2024-02-01 ENCOUNTER — NURSING HOME VISIT (OUTPATIENT)
Dept: POST ACUTE CARE | Facility: EXTERNAL LOCATION | Age: 89
End: 2024-02-01
Payer: COMMERCIAL

## 2024-02-01 DIAGNOSIS — N18.4 CKD (CHRONIC KIDNEY DISEASE) STAGE 4, GFR 15-29 ML/MIN (MULTI): ICD-10-CM

## 2024-02-01 DIAGNOSIS — I10 PRIMARY HYPERTENSION: ICD-10-CM

## 2024-02-01 PROCEDURE — 99309 SBSQ NF CARE MODERATE MDM 30: CPT | Performed by: FAMILY MEDICINE

## 2024-02-01 NOTE — LETTER
Patient: Camelia Jones  : 1934    Encounter Date: 2024    Resident seen 24 -- MP    CC: [facility] ([facility]) Recheck    : 1934  SNF H&P done 23, 23 (EPIC)  Allergy: Azithro, Codeine, PCN, Cipro, Sulfa, Corn  DNR-CCA, NO HD    S: 88 yo woman with HLD, CKD4, HTN, recent displaced fracture of L5 and sacral ala, anemia and hyponatremia. Med List & Problem list reviewed. No cp/sob.    O: VSS AFEB 116# (down 3#). A&O NAD. Chest cta. heart rrr. Sinus TTP. Ext trace Edema. TTP lumbosacral spine.    LAB (2024) F/U Labs pending.  (24) Alb 3.7, Na 140, K 4.2, Cr 2.7, GFR 16, Phos 5.6,  (23) Hgb 8.1  (23) Ferritin 1165, Iron 25->31, TIBC 190    (23) Urine Electrophoresis -- No M Protein. Urine osmolality 513.  (23) UA NEG.    10/1/23 CXR Left basilar infiltrate.    A/P:  # Hx Sinusitis: continue mucinex, completed clindamycin course 2024.  # Left knee OA: improved with topical blue emu. can consider CSI if interested.  # Hyponatremia: stable OFF salt tablet. Monitor levels. Increase FR to 1800 cc 24.  # LE Edema: stable on torsemide 20 mg daily. No sign of CHF. CAYETANO hose. Suspect chronic lymphedema as remains present even with significant weight loss.  # L5 & Sacral Ala fx: pain control inadequate with tylenol. Pt refuses oxycodone. Pain stable even OFF tramadol but keeping this available in case of an emergency. Completed SNF Rehab. LTC. F/U with ortho. Lidoderm patch.  # Depression and Chronic pain: patient declined offer of cymbalta 30 mg qam.  # HTN: ustable on norvasc 10, Coreg 12.5 bid.  # HLD: statin, ASA  # Anemia: improving w B12 1mg IM weekly x 8. Iron stores replete. OFF ferrous sulfate 23 to help with constipation. May benefit from EPO if does not climb above 10.  # CKD4: monitor labs. F/U Nephro Rosplock. NO HD.  # Insomnia: Patient declines Ambien, melatonin and tramadol. Offer herbal tea, capsaicin cream.  # Hx Constipation: stopped tums, Ca, Fe,  continue colace, increase miralax to BID.  # Hx Left lung atelectasis: no cough, fever, hypoxia. improved with I.S.      Electronically Signed By: Trav Rodriguez MD   2/11/24  4:10 PM

## 2024-02-07 NOTE — PROGRESS NOTES
Resident seen 24 -- MP    CC: [facility]TC ([facility]) Recheck    : 1934  SNF H&P done 23, 23 (EPIC)  Allergy: Azithro, Codeine, PCN, Cipro, Sulfa, Corn  DNR-CCA, NO HD    S: 89 yo woman with HLD, CKD4, HTN, recent displaced fracture of L5 and sacral ala, anemia and hyponatremia. Med List & Problem list reviewed. No cp/sob.    O: VSS AFEB 116# (down 3#). A&O NAD. Chest cta. heart rrr. Sinus TTP. Ext trace Edema. TTP lumbosacral spine.    LAB (2024) F/U Labs pending.  (24) Alb 3.7, Na 140, K 4.2, Cr 2.7, GFR 16, Phos 5.6,  (23) Hgb 8.1  (23) Ferritin 1165, Iron 25->31, TIBC 190    (23) Urine Electrophoresis -- No M Protein. Urine osmolality 513.  (23) UA NEG.    10/1/23 CXR Left basilar infiltrate.    A/P:  # Hx Sinusitis: continue mucinex, completed clindamycin course 2024.  # Left knee OA: improved with topical blue emu. can consider CSI if interested.  # Hyponatremia: stable OFF salt tablet. Monitor levels. Increase FR to 1800 cc 24.  # LE Edema: stable on torsemide 20 mg daily. No sign of CHF. CAYETANO hose. Suspect chronic lymphedema as remains present even with significant weight loss.  # L5 & Sacral Ala fx: pain control inadequate with tylenol. Pt refuses oxycodone. Pain stable even OFF tramadol but keeping this available in case of an emergency. Completed SNF Rehab. LTC. F/U with ortho. Lidoderm patch.  # Depression and Chronic pain: patient declined offer of cymbalta 30 mg qam.  # HTN: ustable on norvasc 10, Coreg 12.5 bid.  # HLD: statin, ASA  # Anemia: improving w B12 1mg IM weekly x 8. Iron stores replete. OFF ferrous sulfate 23 to help with constipation. May benefit from EPO if does not climb above 10.  # CKD4: monitor labs. F/U Nephro Rosplock. NO HD.  # Insomnia: Patient declines Ambien, melatonin and tramadol. Offer herbal tea, capsaicin cream.  # Hx Constipation: stopped tums, Ca, Fe, continue colace, increase miralax to BID.  # Hx Left lung atelectasis: no  cough, fever, hypoxia. improved with I.S.

## 2024-03-07 ENCOUNTER — NURSING HOME VISIT (OUTPATIENT)
Dept: POST ACUTE CARE | Facility: EXTERNAL LOCATION | Age: 89
End: 2024-03-07
Payer: COMMERCIAL

## 2024-03-07 DIAGNOSIS — N18.4 CKD (CHRONIC KIDNEY DISEASE) STAGE 4, GFR 15-29 ML/MIN (MULTI): ICD-10-CM

## 2024-03-07 DIAGNOSIS — M17.12 PRIMARY OSTEOARTHRITIS OF LEFT KNEE: ICD-10-CM

## 2024-03-07 PROCEDURE — 99309 SBSQ NF CARE MODERATE MDM 30: CPT | Performed by: FAMILY MEDICINE

## 2024-03-07 NOTE — LETTER
Patient: Camelia Jones  : 1934    Encounter Date: 2024    Resident seen 3/7/24 -- MP    CC: [facility] ([facility]) Recheck    : 1934  SNF H&P done 23, 23 (EPIC)  Allergy: Azithro, Codeine, PCN, Cipro, Sulfa, Corn  DNR-CCA, NO HD    S: 88 yo woman with HLD, CKD4, HTN, recent displaced fracture of L5 and sacral ala, anemia and hyponatremia. C/O b/l knee pain since ambulating more with restorative tx. Med List & Problem list reviewed. No cp/sob.    O: VSS AFEB 115# (down 1#). A&O NAD. Chest cta. heart rrr. Sinus TTP. Ext trace Edema. TTP b/l knees.    LAB (2024) F/U Labs pending.  (24) Alb 3.7, Na 140, K 4.2, Cr 2.7, GFR 16, Phos 5.6,  (23) Hgb 8.1  (23) Ferritin 1165, Iron 25->31, TIBC 190    (23) Urine Electrophoresis -- No M Protein. Urine osmolality 513.  (23) UA NEG.    10/1/23 CXR Left basilar infiltrate.    A/P:  # Left>Right knee OA: improved with topical blue emu. offer CSI for next week.  # Hyponatremia: stable OFF salt tablet. Monitor levels. Increase FR to 1800 cc 24.  # LE Edema: stable on torsemide 20 mg daily. No sign of CHF. CAYETANO hose. Suspect chronic lymphedema as remains present even with significant weight loss.  # L5 & Sacral Ala fx: pain control inadequate with tylenol. Pt refuses oxycodone. Pain stable even OFF tramadol but keeping this available in case of an emergency. Completed SNF Rehab. LTC. F/U with ortho. Lidoderm patch.  # Depression and Chronic pain: patient declined offer of cymbalta 30 mg qam.  # HTN: ustable on norvasc 10, Coreg 12.5 bid.  # HLD: statin, ASA  # Anemia: improving w B12 1mg IM weekly x 8. Iron stores replete. OFF ferrous sulfate 23 to help with constipation. May benefit from EPO if does not climb above 10.  # CKD4: monitor labs. F/U Nephro Rosplock. NO HD.  # Insomnia: Patient declines Ambien, melatonin and tramadol. Offer herbal tea, capsaicin cream.  # Hx Constipation: stopped tums, Ca, Fe, continue colace, increase  miralax to BID.  # Hx Left lung atelectasis: no cough, fever, hypoxia. improved with I.S.      Electronically Signed By: Trav Rodriguez MD   3/30/24  5:31 PM

## 2024-03-11 ENCOUNTER — NURSING HOME VISIT (OUTPATIENT)
Dept: POST ACUTE CARE | Facility: EXTERNAL LOCATION | Age: 89
End: 2024-03-11
Payer: COMMERCIAL

## 2024-03-11 DIAGNOSIS — H00.014 HORDEOLUM EXTERNUM OF LEFT UPPER EYELID: ICD-10-CM

## 2024-03-11 DIAGNOSIS — M17.12 PRIMARY OSTEOARTHRITIS OF LEFT KNEE: Primary | ICD-10-CM

## 2024-03-11 PROCEDURE — 99308 SBSQ NF CARE LOW MDM 20: CPT | Performed by: FAMILY MEDICINE

## 2024-03-11 NOTE — PROGRESS NOTES
Resident seen 3/7/24 -- MP    CC: [facility] ([facility]) Recheck    : 1934  SNF H&P done 23, 23 (EPIC)  Allergy: Azithro, Codeine, PCN, Cipro, Sulfa, Corn  DNR-CCA, NO HD    S: 88 yo woman with HLD, CKD4, HTN, recent displaced fracture of L5 and sacral ala, anemia and hyponatremia. C/O b/l knee pain since ambulating more with restorative tx. Med List & Problem list reviewed. No cp/sob.    O: VSS AFEB 115# (down 1#). A&O NAD. Chest cta. heart rrr. Sinus TTP. Ext trace Edema. TTP b/l knees.    LAB (2024) F/U Labs pending.  (24) Alb 3.7, Na 140, K 4.2, Cr 2.7, GFR 16, Phos 5.6,  (23) Hgb 8.1  (23) Ferritin 1165, Iron 25->31, TIBC 190    (23) Urine Electrophoresis -- No M Protein. Urine osmolality 513.  (23) UA NEG.    10/1/23 CXR Left basilar infiltrate.    A/P:  # Left>Right knee OA: improved with topical blue emu. offer CSI for next week.  # Hyponatremia: stable OFF salt tablet. Monitor levels. Increase FR to 1800 cc 24.  # LE Edema: stable on torsemide 20 mg daily. No sign of CHF. CAYETANO hose. Suspect chronic lymphedema as remains present even with significant weight loss.  # L5 & Sacral Ala fx: pain control inadequate with tylenol. Pt refuses oxycodone. Pain stable even OFF tramadol but keeping this available in case of an emergency. Completed SNF Rehab. LTC. F/U with ortho. Lidoderm patch.  # Depression and Chronic pain: patient declined offer of cymbalta 30 mg qam.  # HTN: ustable on norvasc 10, Coreg 12.5 bid.  # HLD: statin, ASA  # Anemia: improving w B12 1mg IM weekly x 8. Iron stores replete. OFF ferrous sulfate 23 to help with constipation. May benefit from EPO if does not climb above 10.  # CKD4: monitor labs. F/U Nephro Rosplock. NO HD.  # Insomnia: Patient declines Ambien, melatonin and tramadol. Offer herbal tea, capsaicin cream.  # Hx Constipation: stopped tums, Ca, Fe, continue colace, increase miralax to BID.  # Hx Left lung atelectasis: no cough, fever, hypoxia.  improved with I.S.

## 2024-03-11 NOTE — LETTER
Patient: Camelia Jones  : 1934    Encounter Date: 2024    Resident seen 3/11/24 -- MP    CC: [facility] ([facility]) Recheck/Stye    : 1934  SNF H&P done 23, 23 (EPIC)  Allergy: Azithro, Codeine, PCN, Cipro, Sulfa, Corn  DNR-CCA, NO HD    S: 88 yo woman with HLD, CKD4, HTN, recent displaced fracture of L5 and sacral ala, anemia and hyponatremia. C/O left eye pain. Med List & Problem list reviewed. No cp/sob.    O: VSS AFEB 115# (stable). A&O NAD. Chest cta. heart rrr. Sinus TTP. Ext trace Edema. TTP b/l knees. Left eye superior lid stye.    LAB (2024) F/U Labs pending.  (24) Alb 3.7, Na 140, K 4.2, Cr 2.7, GFR 16, Phos 5.6,  (23) Hgb 8.1  (23) Ferritin 1165, Iron 25->31, TIBC 190    (23) Urine Electrophoresis -- No M Protein. Urine osmolality 513.  (23) UA NEG.    10/1/23 CXR Left basilar infiltrate.    A/P:  # Left eye stye: EES oint. Warm compresses.  # Left>Right knee OA: improved with topical blue emu. offer CSI for next week.  # Hyponatremia: stable OFF salt tablet. Monitor levels. Increase FR to 1800 cc 24.  # LE Edema: stable on torsemide 20 mg daily. No sign of CHF. CAYETANO hose. Suspect chronic lymphedema as remains present even with significant weight loss.  # L5 & Sacral Ala fx: pain control inadequate with tylenol. Pt refuses oxycodone. Pain stable even OFF tramadol but keeping this available in case of an emergency. Completed SNF Rehab. LTC. F/U with ortho. Lidoderm patch.  # Depression and Chronic pain: patient declined offer of cymbalta 30 mg qam.  # HTN: ustable on norvasc 10, Coreg 12.5 bid.  # HLD: statin, ASA  # Anemia: improving w B12 1mg IM weekly x 8. Iron stores replete. OFF ferrous sulfate 23 to help with constipation. May benefit from EPO if does not climb above 10.  # CKD4: monitor labs. F/U Nephro Rosplock. NO HD.  # Insomnia: Patient declines Ambien, melatonin and tramadol. Offer herbal tea, capsaicin cream.  # Hx Constipation: stopped  tums, Ca, Fe, continue colace, increase miralax to BID.  # Hx Left lung atelectasis: no cough, fever, hypoxia. improved with I.S.      Electronically Signed By: Trav Rodriguez MD   3/30/24  5:32 PM

## 2024-03-14 ENCOUNTER — NURSING HOME VISIT (OUTPATIENT)
Dept: POST ACUTE CARE | Facility: EXTERNAL LOCATION | Age: 89
End: 2024-03-14
Payer: COMMERCIAL

## 2024-03-14 DIAGNOSIS — M17.11 PRIMARY OSTEOARTHRITIS OF RIGHT KNEE: Primary | ICD-10-CM

## 2024-03-14 DIAGNOSIS — M17.12 PRIMARY OSTEOARTHRITIS OF LEFT KNEE: ICD-10-CM

## 2024-03-14 PROBLEM — H00.014 HORDEOLUM EXTERNUM OF LEFT UPPER EYELID: Status: ACTIVE | Noted: 2024-03-14

## 2024-03-14 PROCEDURE — 20610 DRAIN/INJ JOINT/BURSA W/O US: CPT | Performed by: FAMILY MEDICINE

## 2024-03-14 PROCEDURE — 99308 SBSQ NF CARE LOW MDM 20: CPT | Performed by: FAMILY MEDICINE

## 2024-03-14 NOTE — LETTER
Patient: Camelia Jones  : 1934    Encounter Date: 2024    Resident seen 3/14/23 -- MP    CC: SNF ([facility]) Recheck    : 10/12/1940  Admit H&P Done 10/27/22 ([facility]), 23 (EPIC)  Allergy: Morphine, PCN, basil  Full Code    S: 84 yo man with CAD, congenital heart disease s/p open heart surgery (), HLD, Asthma/COPD, BPH w obstruction and chronic left LE weakness (no CVA or significant spinal stenosis per MRI). No CP/SOB. Persistent thigh blisters -- Derm consult 2024 recommended mupirocin and ZnO. Med list & Problem list reviewed.    O: VSS AFEB 164# (up 42) Awake, alert, NAD. Chest CTA. Heart rrr. LUCIANO. Left thigh 1 cm yellow serous filled bulla.    LAB (24) Hgb 12.9, Alb 3.3, Cr 0.95, Na 142, K 4.1  (23) TSH 3.9,     CXR (23) Clear. No CHF. No Pna.    SLUMS (2023) 18/30    ECHO (23) EF 50-55%, mild AR, Trace TR/MR. No effusion.    A/P:  # Bullous rash: persist despite change to silicon catheters. Treat with prednisone 10 mg daily.  # Diarrhea: stable on prn imodium.  # Hypokalemia: replete on 40 bid started 23.  # LLE>RLE weakness: completed SNF PT/OT. F/U w Dr [facility]. Hx EMG.  # Asthma/COPD (last exac 10/12/23): acapella at bedside, TRELEGY, albuterol prn. Appreciate help from Dr Fontenot/Pulm.  # CAD/HLD: statin, asa. hx pig valve placed . Normal ECHO 23.  # LE Edema/stasis dermatitis: compression stockings. Tolerating lasix 40 daily and aldactone 25. Monitor weight.  # Hx Glaucoma: off latanoprost eye drops.  # Back pain: percocet q8h prn.  # BPH w LUTS/obstruction: chronic Luciano. Off flomax due to hypotension. Saw Dr. Boston 23-- offered prostate surgery which patient declined.  # Dementia: ST, SLUMS . OFF donepezil -- did not tolerate.    Electronically Signed By: Trav Rodriguez MD   3/30/24  5:34 PM

## 2024-03-14 NOTE — PROGRESS NOTES
Resident seen 3/11/24 -- MP    CC: LTC ([facility]) Recheck/Stye    : 1934  SNF H&P done 23, 23 (EPIC)  Allergy: Azithro, Codeine, PCN, Cipro, Sulfa, Corn  DNR-CCA, NO HD    S: 89 yo woman with HLD, CKD4, HTN, recent displaced fracture of L5 and sacral ala, anemia and hyponatremia. C/O left eye pain. Med List & Problem list reviewed. No cp/sob.    O: VSS AFEB 115# (stable). A&O NAD. Chest cta. heart rrr. Sinus TTP. Ext trace Edema. TTP b/l knees. Left eye superior lid stye.    LAB (2024) F/U Labs pending.  (24) Alb 3.7, Na 140, K 4.2, Cr 2.7, GFR 16, Phos 5.6,  (23) Hgb 8.1  (23) Ferritin 1165, Iron 25->31, TIBC 190    (23) Urine Electrophoresis -- No M Protein. Urine osmolality 513.  (23) UA NEG.    10/1/23 CXR Left basilar infiltrate.    A/P:  # Left eye stye: EES oint. Warm compresses.  # Left>Right knee OA: improved with topical blue emu. offer CSI for next week.  # Hyponatremia: stable OFF salt tablet. Monitor levels. Increase FR to 1800 cc 24.  # LE Edema: stable on torsemide 20 mg daily. No sign of CHF. CAYETANO hose. Suspect chronic lymphedema as remains present even with significant weight loss.  # L5 & Sacral Ala fx: pain control inadequate with tylenol. Pt refuses oxycodone. Pain stable even OFF tramadol but keeping this available in case of an emergency. Completed SNF Rehab. LTC. F/U with ortho. Lidoderm patch.  # Depression and Chronic pain: patient declined offer of cymbalta 30 mg qam.  # HTN: ustable on norvasc 10, Coreg 12.5 bid.  # HLD: statin, ASA  # Anemia: improving w B12 1mg IM weekly x 8. Iron stores replete. OFF ferrous sulfate 23 to help with constipation. May benefit from EPO if does not climb above 10.  # CKD4: monitor labs. F/U Nephro Rosplock. NO HD.  # Insomnia: Patient declines Ambien, melatonin and tramadol. Offer herbal tea, capsaicin cream.  # Hx Constipation: stopped tums, Ca, Fe, continue colace, increase miralax to BID.  # Hx Left lung  atelectasis: no cough, fever, hypoxia. improved with I.S.

## 2024-03-20 ENCOUNTER — APPOINTMENT (OUTPATIENT)
Dept: CARDIOLOGY | Facility: CLINIC | Age: 89
End: 2024-03-20
Payer: MEDICARE

## 2024-03-27 NOTE — PROGRESS NOTES
Resident seen 3/14/23 -- MP    CC: SNF ([facility]) Recheck    : 10/12/1940  Admit H&P Done 10/27/22 ([facility]), 23 (EPIC)  Allergy: Morphine, PCN, basil  Full Code    S: 82 yo man with CAD, congenital heart disease s/p open heart surgery (), HLD, Asthma/COPD, BPH w obstruction and chronic left LE weakness (no CVA or significant spinal stenosis per MRI). No CP/SOB. Persistent thigh blisters -- Derm consult 2024 recommended mupirocin and ZnO. Med list & Problem list reviewed.    O: VSS AFEB 164# (up 42) Awake, alert, NAD. Chest CTA. Heart rrr. LUCIANO. Left thigh 1 cm yellow serous filled bulla.    LAB (24) Hgb 12.9, Alb 3.3, Cr 0.95, Na 142, K 4.1  (23) TSH 3.9,     CXR (23) Clear. No CHF. No Pna.    SLUMS (2023)     ECHO (23) EF 50-55%, mild AR, Trace TR/MR. No effusion.    A/P:  # Bullous rash: persist despite change to silicon catheters. Treat with prednisone 10 mg daily.  # Diarrhea: stable on prn imodium.  # Hypokalemia: replete on 40 bid started 23.  # LLE>RLE weakness: completed SNF PT/OT. F/U w Dr [facility]. Hx EMG.  # Asthma/COPD (last exac 10/12/23): acapella at bedside, TRELEGY, albuterol prn. Appreciate help from Dr Fontenot/Pulm.  # CAD/HLD: statin, asa. hx pig valve placed . Normal ECHO 23.  # LE Edema/stasis dermatitis: compression stockings. Tolerating lasix 40 daily and aldactone 25. Monitor weight.  # Hx Glaucoma: off latanoprost eye drops.  # Back pain: percocet q8h prn.  # BPH w LUTS/obstruction: chronic Luciano. Off flomax due to hypotension. Saw Dr. Boston 23-- offered prostate surgery which patient declined.  # Dementia: KANIKA HAY . OFF donepezil -- did not tolerate.

## 2024-04-04 ENCOUNTER — NURSING HOME VISIT (OUTPATIENT)
Dept: POST ACUTE CARE | Facility: EXTERNAL LOCATION | Age: 89
End: 2024-04-04
Payer: COMMERCIAL

## 2024-04-04 DIAGNOSIS — I10 PRIMARY HYPERTENSION: ICD-10-CM

## 2024-04-04 DIAGNOSIS — M47.816 LUMBAR SPONDYLOSIS: ICD-10-CM

## 2024-04-04 PROCEDURE — 99309 SBSQ NF CARE MODERATE MDM 30: CPT | Performed by: FAMILY MEDICINE

## 2024-04-04 NOTE — LETTER
Patient: Camelia Jones  : 1934    Encounter Date: 2024    Resident seen 24 -- MP    CC: L[facility] ([facility]) Recheck    : 1934  SNF H&P done 23, 23 (EPIC)  Allergy: Azithro, Codeine, PCN, Cipro, Sulfa, Corn  DNR-CCA, NO HD    S: 88 yo woman with HLD, CKD4, HTN, OA anemia and hyponatremia. C/O low back, hip, and heel pain. Per NURSING has been walking more with walker after Knee injections. Med List & Problem list reviewed. No cp/sob.    O: VSS AFEB 116# (stable). A&O NAD. Chest cta. heart rrr. Sinus TTP. Ext left LE Edema >Right. TTP lumbo-sacral spine and right SI joint. Left eye superior lid stye improving.    LAB (2024) F/U Labs pending.  (24) Alb 3.7, Na 140, K 4.2, Cr 2.7, GFR 16, Phos 5.6,  (23) Hgb 8.1  (23) Ferritin 1165, Iron 25->31, TIBC 190    (23) Urine Electrophoresis -- No M Protein. Urine osmolality 513.    3/14/24 PROCEDURE: verbal consent obtained. B/L knee lateral joint aspiration & injection of 3 cc 2% lidocaine+ 1 cc depomedrol 40. Pt tolerated well.    A/P:  # Left eye stye: resolved.  # Left>Right knee OA: improved with topical blue emu and CSI 3/14/24 -- next due no sooner than 24.  # Hyponatremia: stable OFF salt tablet. Monitor levels. Increase FR to 1800 cc 24.  # LE Edema: stable on torsemide 20 mg daily. No sign of CHF. CAYETANO hose. Suspect chronic lymphedema as remains present even with significant weight loss.  # Lumbar spondylosis and hx L5 & Sacral Ala fx: pain control inadequate with tylenol. Pt refuses oxycodone. Pain stable even OFF tramadol but keeping this available in case of an emergency. Completed SNF Rehab. LTC. F/U with ortho. Lidoderm patch. Order Part B PT/OT for pain mgmt modalities.  # Depression and Chronic pain: patient declines offer of cymbalta 30 mg qam.  # B/L heel pain c/w PF -- night splints.  # HTN: stable on norvasc 10, Coreg 12.5 bid.  # HLD: statin, ASA  # Anemia: improving w B12 1mg IM weekly x 8. Iron  stores replete. OFF ferrous sulfate 11/2/23 to help with constipation. May benefit from EPO if does not climb above 10.  # CKD4: monitor labs. F/U Nephro Ros[place]ock. NO HD.  # Insomnia: Patient declines Ambien, melatonin and tramadol. Offer herbal tea, capsaicin cream.  # Hx Constipation: OFF tums, Ca, & Fe. Continue colace & miralax.      Electronically Signed By: Trav Rodriguez MD   4/6/24  8:46 PM

## 2024-04-05 PROBLEM — M47.816 LUMBAR SPONDYLOSIS: Status: ACTIVE | Noted: 2024-04-05

## 2024-04-05 NOTE — PROGRESS NOTES
Resident seen 24 -- MP    CC: [facility] ([facility]) Recheck    : 1934  SNF H&P done 23, 23 (EPIC)  Allergy: Azithro, Codeine, PCN, Cipro, Sulfa, Corn  DNR-CCA, NO HD    S: 88 yo woman with HLD, CKD4, HTN, OA anemia and hyponatremia. C/O low back, hip, and heel pain. Per NURSING has been walking more with walker after Knee injections. Med List & Problem list reviewed. No cp/sob.    O: VSS AFEB 116# (stable). A&O NAD. Chest cta. heart rrr. Sinus TTP. Ext left LE Edema >Right. TTP lumbo-sacral spine and right SI joint. Left eye superior lid stye improving.    LAB (2024) F/U Labs pending.  (24) Alb 3.7, Na 140, K 4.2, Cr 2.7, GFR 16, Phos 5.6,  (23) Hgb 8.1  (23) Ferritin 1165, Iron 25->31, TIBC 190    (23) Urine Electrophoresis -- No M Protein. Urine osmolality 513.    3/14/24 PROCEDURE: verbal consent obtained. B/L knee lateral joint aspiration & injection of 3 cc 2% lidocaine+ 1 cc depomedrol 40. Pt tolerated well.    A/P:  # Left eye stye: resolved.  # Left>Right knee OA: improved with topical blue emu and CSI 3/14/24 -- next due no sooner than 24.  # Hyponatremia: stable OFF salt tablet. Monitor levels. Increase FR to 1800 cc 24.  # LE Edema: stable on torsemide 20 mg daily. No sign of CHF. CAYETANO hose. Suspect chronic lymphedema as remains present even with significant weight loss.  # Lumbar spondylosis and hx L5 & Sacral Ala fx: pain control inadequate with tylenol. Pt refuses oxycodone. Pain stable even OFF tramadol but keeping this available in case of an emergency. Completed SNF Rehab. LTC. F/U with ortho. Lidoderm patch. Order Part B PT/OT for pain mgmt modalities.  # Depression and Chronic pain: patient declines offer of cymbalta 30 mg qam.  # B/L heel pain c/w PF -- night splints.  # HTN: stable on norvasc 10, Coreg 12.5 bid.  # HLD: statin, ASA  # Anemia: improving w B12 1mg IM weekly x 8. Iron stores replete. OFF ferrous sulfate 23 to help with constipation. May  benefit from EPO if does not climb above 10.  # CKD4: monitor labs. F/U Nephro Rosplock. NO HD.  # Insomnia: Patient declines Ambien, melatonin and tramadol. Offer herbal tea, capsaicin cream.  # Hx Constipation: OFF tums, Ca, & Fe. Continue colace & miralax.

## 2024-05-02 ENCOUNTER — NURSING HOME VISIT (OUTPATIENT)
Dept: POST ACUTE CARE | Facility: EXTERNAL LOCATION | Age: 89
End: 2024-05-02
Payer: COMMERCIAL

## 2024-05-02 DIAGNOSIS — R26.81 GAIT INSTABILITY: ICD-10-CM

## 2024-05-02 DIAGNOSIS — M17.12 PRIMARY OSTEOARTHRITIS OF LEFT KNEE: ICD-10-CM

## 2024-05-02 DIAGNOSIS — R53.1 WEAKNESS: ICD-10-CM

## 2024-05-02 PROCEDURE — 99309 SBSQ NF CARE MODERATE MDM 30: CPT | Performed by: FAMILY MEDICINE

## 2024-05-02 NOTE — LETTER
Patient: Camelia Jones  : 1934    Encounter Date: 2024    Resident seen 24 -- MP    CC: [facility] ([facility]) Recheck/F2F mobility eval.    : 1934  SNF H&P done 23, 23 (EPIC)  Allergy: Azithro, Codeine, PCN, Cipro, Sulfa, Corn  DNR-CCA, NO HD    S: 88 yo woman with HLD, CKD4, HTN, OA anemia and hyponatremia. C/O low back, hip, and heel pain. Per NURSING has been walking more with walker to independent toileting and hygiene. Knee feels better after Knee injections but increased activity causing more back pain. In wheelchair >8 hours a day. Requires WC to perform ADLs in her home of bathing, mobility to activities, and dressing. Med List & Problem list reviewed. No cp/sob.    O: VSS AFEB 117# (up 1#). A&O NAD. Chest cta. heart rrr. Sinus TTP. Ext left LE Edema >Right. TTP lumbo-sacral spine and right SI joint. Left eye superior lid stye improving.    LAB (2024) F/U Labs pending.  (24) Alb 3.7, Na 140, K 4.2, Cr 2.7, GFR 16, Phos 5.6,  (23) Hgb 8.1  (23) Ferritin 1165, Iron 25->31, TIBC 190    (23) Urine Electrophoresis -- No M Protein. Urine osmolality 513.    3/14/24 PROCEDURE: verbal consent obtained. B/L knee lateral joint aspiration & injection of 3 cc 2% lidocaine+ 1 cc depomedrol 40. Pt tolerated well.    A/P:  # Muscle weakness and gait instability: Camelia Jones requires a standard manual wheelchair to perform ADLs of dressing, bathing, and mobility within her LTC home setting. This patient would benefit from a wheelchair to aide in completion of ADLs.  # Left>Right knee OA: improved with topical blue emu and CSI 3/14/24 -- next due no sooner than 24.  # Hyponatremia: stable OFF salt tablet. Monitor levels. Increase FR to 1800 cc 2/1/24.  # LE Edema: stable on Torsemide 20 mg daily. No sign of CHF. CAYETANO hose. Suspect chronic lymphedema as remains present even with significant weight loss.  # Lumbar spondylosis and hx L5 & Sacral Ala fx: pain control inadequate  with tylenol. Pt refuses oxycodone and tramadol. Completed SNF Rehab. LTC. F/U with ortho. Lidoderm patch. Order Part B PT/OT for pain mgmt modalities.  # Depression and Chronic pain: patient declines offer of cymbalta 30 mg qam.  # B/L heel pain c/w PF -- night splints.  # HTN: stable on norvasc 10, Coreg 12.5 bid.  # HLD: statin, ASA  # Anemia: improving w B12 1mg IM weekly x 8. Iron stores replete. OFF ferrous sulfate 11/2/23 to help with constipation. May benefit from EPO if does not climb above 10.  # CKD4: monitor labs. F/U Nephro Ros[place]ock. NO HD.  # Insomnia: Patient declines Ambien, melatonin and tramadol. Offer herbal tea, capsaicin cream.  # Hx Constipation: OFF tums, Ca, & Fe. Continue colace & miralax.      Electronically Signed By: Trav Rodriguez MD   5/11/24 12:41 PM

## 2024-05-03 NOTE — PROGRESS NOTES
Resident seen 24 -- MP    CC: [facility] ([facility]) Recheck/F2F mobility eval.    : 1934  SNF H&P done 23, 23 (EPIC)  Allergy: Azithro, Codeine, PCN, Cipro, Sulfa, Corn  DNR-CCA, NO HD    S: 88 yo woman with HLD, CKD4, HTN, OA anemia and hyponatremia. C/O low back, hip, and heel pain. Per NURSING has been walking more with walker to independent toileting and hygiene. Knee feels better after Knee injections but increased activity causing more back pain. In wheelchair >8 hours a day. Requires WC to perform ADLs in her home of bathing, mobility to activities, and dressing. Med List & Problem list reviewed. No cp/sob.    O: VSS AFEB 117# (up 1#). A&O NAD. Chest cta. heart rrr. Sinus TTP. Ext left LE Edema >Right. TTP lumbo-sacral spine and right SI joint. Left eye superior lid stye improving.    LAB (2024) F/U Labs pending.  (24) Alb 3.7, Na 140, K 4.2, Cr 2.7, GFR 16, Phos 5.6,  (23) Hgb 8.1  (23) Ferritin 1165, Iron 25->31, TIBC 190    (23) Urine Electrophoresis -- No M Protein. Urine osmolality 513.    3/14/24 PROCEDURE: verbal consent obtained. B/L knee lateral joint aspiration & injection of 3 cc 2% lidocaine+ 1 cc depomedrol 40. Pt tolerated well.    A/P:  # Muscle weakness and gait instability: Camelia Jones requires a standard manual wheelchair to perform ADLs of dressing, bathing, and mobility within her LTC home setting. This patient would benefit from a wheelchair to aide in completion of ADLs.  # Left>Right knee OA: improved with topical blue emu and CSI 3/14/24 -- next due no sooner than 24.  # Hyponatremia: stable OFF salt tablet. Monitor levels. Increase FR to 1800 cc 24.  # LE Edema: stable on Torsemide 20 mg daily. No sign of CHF. CAYETANO hose. Suspect chronic lymphedema as remains present even with significant weight loss.  # Lumbar spondylosis and hx L5 & Sacral Ala fx: pain control inadequate with tylenol. Pt refuses oxycodone and tramadol. Completed SNF Rehab.  LTC. F/U with ortho. Lidoderm patch. Order Part B PT/OT for pain mgmt modalities.  # Depression and Chronic pain: patient declines offer of cymbalta 30 mg qam.  # B/L heel pain c/w PF -- night splints.  # HTN: stable on norvasc 10, Coreg 12.5 bid.  # HLD: statin, ASA  # Anemia: improving w B12 1mg IM weekly x 8. Iron stores replete. OFF ferrous sulfate 11/2/23 to help with constipation. May benefit from EPO if does not climb above 10.  # CKD4: monitor labs. F/U Nephro [place]ock. NO HD.  # Insomnia: Patient declines Ambien, melatonin and tramadol. Offer herbal tea, capsaicin cream.  # Hx Constipation: OFF tums, Ca, & Fe. Continue colace & miralax.

## 2024-06-13 ENCOUNTER — NURSING HOME VISIT (OUTPATIENT)
Dept: POST ACUTE CARE | Facility: EXTERNAL LOCATION | Age: 89
End: 2024-06-13
Payer: COMMERCIAL

## 2024-06-13 DIAGNOSIS — I10 PRIMARY HYPERTENSION: ICD-10-CM

## 2024-06-13 DIAGNOSIS — R60.9 EDEMA, UNSPECIFIED TYPE: ICD-10-CM

## 2024-06-13 DIAGNOSIS — M17.12 PRIMARY OSTEOARTHRITIS OF LEFT KNEE: ICD-10-CM

## 2024-06-13 PROCEDURE — 99309 SBSQ NF CARE MODERATE MDM 30: CPT | Performed by: FAMILY MEDICINE

## 2024-06-13 NOTE — PROGRESS NOTES
Resident seen 24 -- MP    CC: [facility] ([facility]) Recheck/F2F mobility eval.    : 1934  SNF H&P done 23, 23 (EPIC)  Allergy: Azithro, Codeine, PCN, Cipro, Sulfa, Corn  DNR-CCA, NO HD    S: 89 yo woman with HLD, CKD4, HTN, OA anemia and hyponatremia. C/O left leg swelling, cough, and knee pain. Med List & Problem list reviewed. No cp/sob.    O: VSS AFEB 119# (up 2#). A&O NAD. Chest cta. heart rrr,  BRIDGET. Ext left LE Edema 2+ > Right 1+.    LAB (2024) Hgb 9, Alb 3.6, Na 142, K 5.5, Cr 2.8, GFR 16, , HDL 52, LDL 71  (23) Ferritin 1165, Iron 25->31, TIBC 190    (23) Urine Electrophoresis -- No M Protein. Urine osmolality 513.    CXR (6/10/24) Clear (no infiltrate, no CHF)    3/14/24 PROCEDURE: verbal consent obtained. B/L knee lateral joint aspiration & injection of 3 cc 2% lidocaine+ 1 cc depomedrol 40. Pt tolerated well.    A/P:  # Muscle weakness and gait instability: Largely Wheelchair bound. Walks with walker at times.  # Cough d/t PND: ayr spray. refuses flonase.  # Left>Right knee OA: improved with topical blue emu and CSI 3/14/24 -- next due no sooner than 24. Plan for 24.  # Hx Hyponatremia: stable OFF salt tablet. Monitor levels. Continue FR to 1800 cc 24.  # LE Edema: stable on Torsemide 20 mg daily. No sign of CHF. CAYETANO hose. Suspect chronic lymphedema as remains present even with significant weight loss.  # Lumbar spondylosis and hx L5 & Sacral Ala fx: pain control inadequate with tylenol. Pt refuses oxycodone and tramadol. Completed SNF Rehab. LTC. F/U with ortho. Lidoderm patch.  # Depression and Chronic pain: patient declines offer of cymbalta 30 mg qam.  # B/L heel pain c/w PF -- night splints.  # HTN: stable on norvasc 10, Coreg 12.5 bid.  # HLD: statin, ASA  # Anemia: improving after B12 1mg IM weekly x 8. Iron stores replete. OFF ferrous sulfate 23 to help with constipation. May benefit from EPO if does not climb above 10.  # CKD4: monitor labs.  F/U Nephro [place]ock. NO HD.  # Insomnia: Patient declines Ambien, melatonin and tramadol. Offer herbal tea, capsaicin cream.  # Hx Constipation: OFF tums, Ca, & Fe. Continue colace & miralax.

## 2024-06-13 NOTE — LETTER
Patient: Camelia Jones  : 1934    Encounter Date: 2024    Resident seen 24 -- MP    CC: [facility] ([facility]) Recheck/F2F mobility eval.    : 1934  SNF H&P done 23, 23 (EPIC)  Allergy: Azithro, Codeine, PCN, Cipro, Sulfa, Corn  DNR-CCA, NO HD    S: 89 yo woman with HLD, CKD4, HTN, OA anemia and hyponatremia. C/O left leg swelling, cough, and knee pain. Med List & Problem list reviewed. No cp/sob.    O: VSS AFEB 119# (up 2#). A&O NAD. Chest cta. heart rrr, 2/6 BRIDGET. Ext left LE Edema 2+ > Right 1+.    LAB (2024) Hgb 9, Alb 3.6, Na 142, K 5.5, Cr 2.8, GFR 16, , HDL 52, LDL 71  (23) Ferritin 1165, Iron 25->31, TIBC 190    (23) Urine Electrophoresis -- No M Protein. Urine osmolality 513.    CXR (6/10/24) Clear (no infiltrate, no CHF)    3/14/24 PROCEDURE: verbal consent obtained. B/L knee lateral joint aspiration & injection of 3 cc 2% lidocaine+ 1 cc depomedrol 40. Pt tolerated well.    A/P:  # Muscle weakness and gait instability: Largely Wheelchair bound. Walks with walker at times.  # Cough d/t PND: ayr spray. refuses flonase.  # Left>Right knee OA: improved with topical blue emu and CSI 3/14/24 -- next due no sooner than 24. Plan for 24.  # Hx Hyponatremia: stable OFF salt tablet. Monitor levels. Continue FR to 1800 cc 24.  # LE Edema: stable on Torsemide 20 mg daily. No sign of CHF. CAYETANO hose. Suspect chronic lymphedema as remains present even with significant weight loss.  # Lumbar spondylosis and hx L5 & Sacral Ala fx: pain control inadequate with tylenol. Pt refuses oxycodone and tramadol. Completed SNF Rehab. LTC. F/U with ortho. Lidoderm patch.  # Depression and Chronic pain: patient declines offer of cymbalta 30 mg qam.  # B/L heel pain c/w PF -- night splints.  # HTN: stable on norvasc 10, Coreg 12.5 bid.  # HLD: statin, ASA  # Anemia: improving after B12 1mg IM weekly x 8. Iron stores replete. OFF ferrous sulfate 23 to help with constipation.  May benefit from EPO if does not climb above 10.  # CKD4: monitor labs. F/U Nephro [place]ock. NO HD.  # Insomnia: Patient declines Ambien, melatonin and tramadol. Offer herbal tea, capsaicin cream.  # Hx Constipation: OFF tums, Ca, & Fe. Continue colace & miralax.      Electronically Signed By: Trav Rodriguez MD   6/30/24  8:55 PM

## 2024-06-20 ENCOUNTER — NURSING HOME VISIT (OUTPATIENT)
Dept: POST ACUTE CARE | Facility: EXTERNAL LOCATION | Age: 89
End: 2024-06-20
Payer: COMMERCIAL

## 2024-06-20 DIAGNOSIS — M17.11 PRIMARY OSTEOARTHRITIS OF RIGHT KNEE: ICD-10-CM

## 2024-06-20 DIAGNOSIS — M17.12 PRIMARY OSTEOARTHRITIS OF LEFT KNEE: ICD-10-CM

## 2024-06-20 PROCEDURE — 20610 DRAIN/INJ JOINT/BURSA W/O US: CPT | Performed by: FAMILY MEDICINE

## 2024-06-20 NOTE — Clinical Note
Patient: Camelia Jones  : 1934    Encounter Date: 2024    No notes on file    Electronically Signed By: Trav Rodriguez MD   24  1:01 PM

## 2024-06-21 PROBLEM — M17.11 PRIMARY OSTEOARTHRITIS OF RIGHT KNEE: Status: ACTIVE | Noted: 2024-06-21

## 2024-06-21 NOTE — PROGRESS NOTES
6/20/24 PROCEDURE: verbal consent obtained. B/L knee lateral joint aspiration & injection of 3 cc 2% lidocaine + 1.5 cc kenalog 40 = 60 mg. Pt tolerated well. repeat no sooner than 9/18/24. MP

## 2024-07-11 ENCOUNTER — NURSING HOME VISIT (OUTPATIENT)
Dept: POST ACUTE CARE | Facility: EXTERNAL LOCATION | Age: 89
End: 2024-07-11
Payer: COMMERCIAL

## 2024-07-11 DIAGNOSIS — M19.90 OSTEOARTHRITIS, UNSPECIFIED OSTEOARTHRITIS TYPE, UNSPECIFIED SITE: ICD-10-CM

## 2024-07-11 DIAGNOSIS — I10 PRIMARY HYPERTENSION: ICD-10-CM

## 2024-07-11 PROCEDURE — 99309 SBSQ NF CARE MODERATE MDM 30: CPT | Performed by: FAMILY MEDICINE

## 2024-07-11 NOTE — LETTER
Patient: Camelia Jones  : 1934    Encounter Date: 2024    Resident seen 24 -- MP    CC: [facility] ([facility]) Recheck    : 1934  SNF H&P done 23, 23 (EPIC)  Allergy: Azithro, Codeine, PCN, Cipro, Sulfa, Corn  DNR-CCA, NO HD    S: 89 yo woman with HLD, CKD4, HTN, OA anemia and hyponatremia. C/O left leg swelling, cough, and knee pain. Med List & Problem list reviewed. No cp/sob.    O: VSS AFEB 120# (up 1#). A&O NAD. Chest cta. heart rrr, 2/6 BRIDGET. Ext left LE Edema 2+ > Right 1+.    LAB (2024) Hgb 9, Alb 3.6, Na 142, K 5.5, Cr 2.8, GFR 16, , HDL 52, LDL 71  (23) Ferritin 1165, Iron 25->31, TIBC 190    (23) Urine Electrophoresis -- No M Protein. Urine osmolality 513.    CXR (6/10/24) Clear (no infiltrate, no CHF)    24 PROCEDURE: verbal consent obtained. B/L knee lateral joint aspiration & injection of 3 cc 2% lidocaine+ kenalog 60. Pt tolerated well.    A/P:  # Muscle weakness and gait instability: Largely Wheelchair bound. Walks with walker at times. Ok to order light weight/smaller walker Rx provided.  # Cough d/t PND: ayr spray. refuses flonase.  # Left>Right knee OA: improved with topical blue emu and CSI 24 -- next due no sooner than 24.  # Hx Hyponatremia: stable OFF salt tablet. Monitor levels. Continue FR to 1800 cc 24.  # LE Edema: stable on Torsemide 20 mg daily. No sign of CHF. CAYETANO hose. Suspect chronic lymphedema as remains present even with significant weight loss.  # Lumbar spondylosis and hx L5 & Sacral Ala fx: pain control inadequate with tylenol. Pt refuses oxycodone and tramadol. Completed SNF Rehab. LTC. F/U with ortho. Lidoderm patch.  # Depression and Chronic pain: patient declines offer of cymbalta 30 mg qam.  # B/L heel pain c/w PF -- night splints.  # HTN: stable on norvasc 10, Coreg 12.5 bid.  # HLD: statin, ASA  # Anemia: improving after B12 1mg IM weekly x 8. Iron stores replete. OFF ferrous sulfate 23 to help with  constipation. May benefit from EPO if does not climb above 10.  # CKD4: monitor labs. F/U Nephro Rosplock. NO HD.  # Insomnia: Patient declines Ambien, melatonin and tramadol. Offer herbal tea, capsaicin cream.  # Hx Constipation: OFF tums, Ca, & Fe. Continue colace & miralax.      Electronically Signed By: Trav Rodriguez MD   7/22/24 10:07 PM

## 2024-07-15 PROBLEM — M19.90 OSTEOARTHRITIS: Status: ACTIVE | Noted: 2024-07-15

## 2024-07-15 NOTE — PROGRESS NOTES
Resident seen 24 -- MP    CC: [facility] ([facility] Recheck    : 1934  SNF H&P done 23, 23 (EPIC)  Allergy: Azithro, Codeine, PCN, Cipro, Sulfa, Corn  DNR-CCA, NO HD    S: 88 yo woman with HLD, CKD4, HTN, OA anemia and hyponatremia. C/O left leg swelling, cough, and knee pain. Med List & Problem list reviewed. No cp/sob.    O: VSS AFEB 120# (up 1#). A&O NAD. Chest cta. heart rrr,  BRIDGET. Ext left LE Edema 2+ > Right 1+.    LAB (2024) Hgb 9, Alb 3.6, Na 142, K 5.5, Cr 2.8, GFR 16, , HDL 52, LDL 71  (23) Ferritin 1165, Iron 25->31, TIBC 190    (23) Urine Electrophoresis -- No M Protein. Urine osmolality 513.    CXR (6/10/24) Clear (no infiltrate, no CHF)    24 PROCEDURE: verbal consent obtained. B/L knee lateral joint aspiration & injection of 3 cc 2% lidocaine+ kenalog 60. Pt tolerated well.    A/P:  # Muscle weakness and gait instability: Largely Wheelchair bound. Walks with walker at times. Ok to order light weight/smaller walker Rx provided.  # Cough d/t PND: ayr spray. refuses flonase.  # Left>Right knee OA: improved with topical blue emu and CSI 24 -- next due no sooner than 24.  # Hx Hyponatremia: stable OFF salt tablet. Monitor levels. Continue FR to 1800 cc 24.  # LE Edema: stable on Torsemide 20 mg daily. No sign of CHF. CAYETANO hose. Suspect chronic lymphedema as remains present even with significant weight loss.  # Lumbar spondylosis and hx L5 & Sacral Ala fx: pain control inadequate with tylenol. Pt refuses oxycodone and tramadol. Completed SNF Rehab. LTC. F/U with ortho. Lidoderm patch.  # Depression and Chronic pain: patient declines offer of cymbalta 30 mg qam.  # B/L heel pain c/w PF -- night splints.  # HTN: stable on norvasc 10, Coreg 12.5 bid.  # HLD: statin, ASA  # Anemia: improving after B12 1mg IM weekly x 8. Iron stores replete. OFF ferrous sulfate 23 to help with constipation. May benefit from EPO if does not climb above 10.  # CKD4: monitor  labs. F/U Nephro [facility]. NO HD.  # Insomnia: Patient declines Ambien, melatonin and tramadol. Offer herbal tea, capsaicin cream.  # Hx Constipation: OFF tums, Ca, & Fe. Continue colace & miralax.

## 2024-08-01 ENCOUNTER — NURSING HOME VISIT (OUTPATIENT)
Dept: POST ACUTE CARE | Facility: EXTERNAL LOCATION | Age: 89
End: 2024-08-01
Payer: COMMERCIAL

## 2024-08-01 DIAGNOSIS — D64.9 ANEMIA, UNSPECIFIED TYPE: ICD-10-CM

## 2024-08-01 DIAGNOSIS — N18.4 CKD (CHRONIC KIDNEY DISEASE) STAGE 4, GFR 15-29 ML/MIN (MULTI): ICD-10-CM

## 2024-08-01 NOTE — LETTER
Patient: Camelia Jones  : 1934    Encounter Date: 2024    Resident seen 24 -- MP    CC: [facility] ([facility]) Recheck    : 1934  SNF H&P done 23, 23 (EPIC)  Allergy: Azithro, Codeine, PCN, Cipro, Sulfa, Corn  DNR-CCA, NO HD    S: 88 yo woman with HLD, CKD4, HTN, OA anemia and hyponatremia. Med List & Problem list reviewed. No cp/sob.    O: VSS AFEB 126# (up 6#). A&O NAD. Chest cta. heart rrr,  BRIDGET. Ext left LE Edema 2+ > Right 1+.    LAB (24) PENDING  (2024) Hgb 9, Alb 3.6, Na 142, K 5.5, Cr 2.8, GFR 16, , HDL 52, LDL 71  (23) Ferritin 1165, Iron 25->31, TIBC 190    (23) Urine Electrophoresis -- No M Protein. Urine osmolality 513.    CXR (6/10/24) Clear (no infiltrate, no CHF)    24 PROCEDURE: verbal consent obtained. B/L knee lateral joint aspiration & injection of 3 cc 2% lidocaine+ kenalog 60. Pt tolerated well.    A/P:  # Muscle weakness and gait instability: Largely Wheelchair bound. Walks with walker at times. Ok to order light weight/smaller walker Rx provided.  # Cough d/t PND: ayr spray. refuses flonase.  # Left>Right knee OA: improved with topical blue emu and CSI 24 -- next due no sooner than 24.  # Hx Hyponatremia: stable OFF salt tablet. Monitor levels. Continue FR to 1800 cc 24.  # LE Edema: stable on Torsemide 20 mg daily. No sign of CHF. CAYETANO hose. Suspect chronic lymphedema as remains present even with significant weight loss.  # Lumbar spondylosis and hx L5 & Sacral Ala fx: pain control inadequate with tylenol. Pt refuses oxycodone and tramadol. Completed SNF Rehab. LTC. F/U with ortho. Lidoderm patch.  # Depression and Chronic pain: patient declines offer of cymbalta 30 mg qam.  # B/L heel pain c/w PF -- night splints.  # HTN: stable on norvasc 10, Coreg 12.5 bid.  # HLD: statin, ASA  # Anemia: improving after B12 1mg IM weekly x 8. Iron stores replete. OFF ferrous sulfate 23 to help with constipation. May benefit from EPO  if does not climb above 10.  # CKD4: monitor labs. F/U Nephro [facility]. NO HD.  # Insomnia: Patient declines Ambien, melatonin and tramadol. Offer herbal tea, capsaicin cream.  # Hx Constipation: OFF tums, Ca, & Fe. Continue colace & miralax.      Electronically Signed By: Trav Rodriguez MD   8/16/24  8:40 PM

## 2024-08-02 NOTE — PROGRESS NOTES
Resident seen 24 -- MP    CC: [facility] ([facility] Recheck    : 1934  SNF H&P done 23, 23 (EPIC)  Allergy: Azithro, Codeine, PCN, Cipro, Sulfa, Corn  DNR-CCA, NO HD    S: 88 yo woman with HLD, CKD4, HTN, OA anemia and hyponatremia. Med List & Problem list reviewed. No cp/sob.    O: VSS AFEB 126# (up 6#). A&O NAD. Chest cta. heart rrr,  BRIDGET. Ext left LE Edema 2+ > Right 1+.    LAB (24) PENDING  (2024) Hgb 9, Alb 3.6, Na 142, K 5.5, Cr 2.8, GFR 16, , HDL 52, LDL 71  (23) Ferritin 1165, Iron 25->31, TIBC 190    (23) Urine Electrophoresis -- No M Protein. Urine osmolality 513.    CXR (6/10/24) Clear (no infiltrate, no CHF)    24 PROCEDURE: verbal consent obtained. B/L knee lateral joint aspiration & injection of 3 cc 2% lidocaine+ kenalog 60. Pt tolerated well.    A/P:  # Muscle weakness and gait instability: Largely Wheelchair bound. Walks with walker at times. Ok to order light weight/smaller walker Rx provided.  # Cough d/t PND: ayr spray. refuses flonase.  # Left>Right knee OA: improved with topical blue emu and CSI 24 -- next due no sooner than 24.  # Hx Hyponatremia: stable OFF salt tablet. Monitor levels. Continue FR to 1800 cc 24.  # LE Edema: stable on Torsemide 20 mg daily. No sign of CHF. CAYETANO hose. Suspect chronic lymphedema as remains present even with significant weight loss.  # Lumbar spondylosis and hx L5 & Sacral Ala fx: pain control inadequate with tylenol. Pt refuses oxycodone and tramadol. Completed SNF Rehab. LTC. F/U with ortho. Lidoderm patch.  # Depression and Chronic pain: patient declines offer of cymbalta 30 mg qam.  # B/L heel pain c/w PF -- night splints.  # HTN: stable on norvasc 10, Coreg 12.5 bid.  # HLD: statin, ASA  # Anemia: improving after B12 1mg IM weekly x 8. Iron stores replete. OFF ferrous sulfate 23 to help with constipation. May benefit from EPO if does not climb above 10.  # CKD4: monitor labs. F/U Nephro [facility].  NO HD.  # Insomnia: Patient declines Ambien, melatonin and tramadol. Offer herbal tea, capsaicin cream.  # Hx Constipation: OFF tums, Ca, & Fe. Continue colace & miralax.

## 2024-08-19 ENCOUNTER — NURSING HOME VISIT (OUTPATIENT)
Dept: POST ACUTE CARE | Facility: EXTERNAL LOCATION | Age: 89
End: 2024-08-19
Payer: COMMERCIAL

## 2024-08-19 DIAGNOSIS — H00.013 HORDEOLUM EXTERNUM OF RIGHT EYE, UNSPECIFIED EYELID: ICD-10-CM

## 2024-08-19 DIAGNOSIS — N18.5 CKD (CHRONIC KIDNEY DISEASE) STAGE 5, GFR LESS THAN 15 ML/MIN (MULTI): ICD-10-CM

## 2024-08-19 PROCEDURE — 99309 SBSQ NF CARE MODERATE MDM 30: CPT | Performed by: FAMILY MEDICINE

## 2024-08-19 NOTE — LETTER
Patient: Camelia Jones  : 1934    Encounter Date: 2024    Resident seen 24 -- MP    CC: [facility] ([facility] Recheck    : 1934  SNF H&P done 23, 23 (EPIC)  Allergy: Azithro, Codeine, PCN, Cipro, Sulfa, Corn  DNR-CCA, NO HD    S: 89 yo woman with HLD, CKD4, HTN, OA anemia and hyponatremia. C/O right eye pain and redness. Med List & Problem list reviewed. No cp/sob.    O: VSS AFEB 130# (up 4#) Right inferior lid hordeolum. A&O NAD. Chest cta. heart rrr,  BRIDGET. Ext left LE Edema 2+ > Right 1+.    LAB (8/15/24) , Alb 3.7, Cr 3.01, GFR 14, K 4.9, Na 138  (2024) Hgb 9, , HDL 52, LDL 71  (23) Ferritin 1165, Iron 25->31, TIBC 190    (23) Urine Electrophoresis -- No M Protein. Urine osmolality 513.    CXR (6/10/24) Clear (no infiltrate, no CHF)    24 PROCEDURE: verbal consent obtained. B/L knee lateral joint aspiration & injection of 3 cc 2% lidocaine+ kenalog 60. Pt tolerated well.    A/P:  # Right eye stye: tobramycin ophth gtts. Warm compresses.  # Muscle weakness and gait instability: Largely Wheelchair bound. Walks with walker at times. Ok to order light weight/smaller walker Rx provided.  # Cough d/t PND: ayr spray. refuses flonase.  # Left>Right knee OA: improved with topical blue emu and CSI 24 -- next due no sooner than 24.  # Hx Hyponatremia: stable OFF salt tablet. Monitor levels. Continue FR to 1800 cc 24.  # LE Edema: stable on Torsemide 20 mg daily. No sign of CHF. CAYETANO hose. Suspect chronic lymphedema as remains present even with significant weight loss.  # Lumbar spondylosis and hx L5 & Sacral Ala fx: pain control inadequate with tylenol. Pt refuses oxycodone and tramadol. Completed SNF Rehab. LTC. F/U with ortho. Lidoderm patch.  # Depression and Chronic pain: patient declines offer of cymbalta 30 mg qam.  # B/L heel pain c/w PF -- night splints.  # HTN: stable on norvasc 10, Coreg 12.5 bid.  # HLD: statin, ASA  # Anemia: improving  after B12 1mg IM weekly x 8. Iron stores replete. OFF ferrous sulfate 11/2/23 to help with constipation. May benefit from EPO if does not climb above 10.  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 F/U Nephro [facility]. NO HD.  # Insomnia: Patient declines Ambien, melatonin and tramadol. Offer herbal tea, capsaicin cream.  # Hx Constipation: OFF tums, Ca, & Fe. Continue colace & miralax.      Electronically Signed By: Trav Rodriguez MD   8/24/24  3:16 PM

## 2024-08-22 PROBLEM — H00.013 HORDEOLUM EXTERNUM OF RIGHT EYE: Status: ACTIVE | Noted: 2024-08-22

## 2024-08-22 PROBLEM — N18.5 CKD (CHRONIC KIDNEY DISEASE) STAGE 5, GFR LESS THAN 15 ML/MIN (MULTI): Status: ACTIVE | Noted: 2023-11-02

## 2024-08-22 NOTE — PROGRESS NOTES
Resident seen 24 -- MP    CC: [facility] ([facility] Recheck    : 1934  SNF H&P done 23, 23 (EPIC)  Allergy: Azithro, Codeine, PCN, Cipro, Sulfa, Corn  DNR-CCA, NO HD    S: 89 yo woman with HLD, CKD4, HTN, OA anemia and hyponatremia. C/O right eye pain and redness. Med List & Problem list reviewed. No cp/sob.    O: VSS AFEB 130# (up 4#) Right inferior lid hordeolum. A&O NAD. Chest cta. heart rrr,  BRIDGET. Ext left LE Edema 2+ > Right 1+.    LAB (8/15/24) , Alb 3.7, Cr 3.01, GFR 14, K 4.9, Na 138  (2024) Hgb 9, , HDL 52, LDL 71  (23) Ferritin 1165, Iron 25->31, TIBC 190    (23) Urine Electrophoresis -- No M Protein. Urine osmolality 513.    CXR (6/10/24) Clear (no infiltrate, no CHF)    24 PROCEDURE: verbal consent obtained. B/L knee lateral joint aspiration & injection of 3 cc 2% lidocaine+ kenalog 60. Pt tolerated well.    A/P:  # Right eye stye: tobramycin ophth gtts. Warm compresses.  # Muscle weakness and gait instability: Largely Wheelchair bound. Walks with walker at times. Ok to order light weight/smaller walker Rx provided.  # Cough d/t PND: ayr spray. refuses flonase.  # Left>Right knee OA: improved with topical blue emu and CSI 24 -- next due no sooner than 24.  # Hx Hyponatremia: stable OFF salt tablet. Monitor levels. Continue FR to 1800 cc 24.  # LE Edema: stable on Torsemide 20 mg daily. No sign of CHF. CAYETANO hose. Suspect chronic lymphedema as remains present even with significant weight loss.  # Lumbar spondylosis and hx L5 & Sacral Ala fx: pain control inadequate with tylenol. Pt refuses oxycodone and tramadol. Completed SNF Rehab. LTC. F/U with ortho. Lidoderm patch.  # Depression and Chronic pain: patient declines offer of cymbalta 30 mg qam.  # B/L heel pain c/w PF -- night splints.  # HTN: stable on norvasc 10, Coreg 12.5 bid.  # HLD: statin, ASA  # Anemia: improving after B12 1mg IM weekly x 8. Iron stores replete. OFF ferrous sulfate  11/2/23 to help with constipation. May benefit from EPO if does not climb above 10.  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 F/U Nephro [facility]. NO HD.  # Insomnia: Patient declines Ambien, melatonin and tramadol. Offer herbal tea, capsaicin cream.  # Hx Constipation: OFF tums, Ca, & Fe. Continue colace & miralax.

## 2024-08-23 ENCOUNTER — NURSING HOME VISIT (OUTPATIENT)
Dept: POST ACUTE CARE | Facility: EXTERNAL LOCATION | Age: 89
End: 2024-08-23
Payer: COMMERCIAL

## 2024-08-23 DIAGNOSIS — L21.9 SEBORRHEIC DERMATITIS: ICD-10-CM

## 2024-08-23 DIAGNOSIS — N18.5 CKD (CHRONIC KIDNEY DISEASE) STAGE 5, GFR LESS THAN 15 ML/MIN (MULTI): Primary | ICD-10-CM

## 2024-08-23 DIAGNOSIS — K59.00 CONSTIPATION, UNSPECIFIED CONSTIPATION TYPE: ICD-10-CM

## 2024-08-23 DIAGNOSIS — D64.9 ANEMIA, UNSPECIFIED TYPE: ICD-10-CM

## 2024-08-23 DIAGNOSIS — R60.9 EDEMA, UNSPECIFIED TYPE: ICD-10-CM

## 2024-08-23 DIAGNOSIS — E56.9 VITAMIN DEFICIENCY: ICD-10-CM

## 2024-08-23 DIAGNOSIS — E87.1 HYPONATREMIA: ICD-10-CM

## 2024-08-23 DIAGNOSIS — S32.059D CLOSED FRACTURE OF FIFTH LUMBAR VERTEBRA WITH ROUTINE HEALING, UNSPECIFIED FRACTURE MORPHOLOGY, SUBSEQUENT ENCOUNTER: ICD-10-CM

## 2024-08-23 DIAGNOSIS — S32.10XD CLOSED FRACTURE OF SACRUM AND COCCYX WITH ROUTINE HEALING, SUBSEQUENT ENCOUNTER: ICD-10-CM

## 2024-08-23 DIAGNOSIS — S32.2XXD CLOSED FRACTURE OF SACRUM AND COCCYX WITH ROUTINE HEALING, SUBSEQUENT ENCOUNTER: ICD-10-CM

## 2024-08-23 DIAGNOSIS — S32.010A CLOSED COMPRESSION FRACTURE OF BODY OF L1 VERTEBRA (MULTI): ICD-10-CM

## 2024-08-23 DIAGNOSIS — E78.5 DYSLIPIDEMIA: ICD-10-CM

## 2024-08-23 DIAGNOSIS — R11.0 NAUSEA: ICD-10-CM

## 2024-08-23 DIAGNOSIS — R13.10 DYSPHAGIA, UNSPECIFIED TYPE: ICD-10-CM

## 2024-08-23 DIAGNOSIS — I10 HYPERTENSION, UNSPECIFIED TYPE: ICD-10-CM

## 2024-08-23 DIAGNOSIS — H10.9 CONJUNCTIVITIS OF BOTH EYES, UNSPECIFIED CONJUNCTIVITIS TYPE: ICD-10-CM

## 2024-08-23 PROCEDURE — 99309 SBSQ NF CARE MODERATE MDM 30: CPT | Performed by: NURSE PRACTITIONER

## 2024-08-27 NOTE — PROGRESS NOTES
*Provider Impression*    Patient is a 89 year old female who is seen today for management of multiple medical problems       #CKD / Edema / Hyponatremia - f/u w/ Dr. Bernard, torsemide 20mg daily, NaCl 1gram BID, renal support drink daily, adjust fluid restriction to 1800mL daily  #Conjunctivitis - tobramycin 0.3% BID then warm compresses until clear  #Seborrheic dermatitis - ketoconazole shampoo  #Constipation / Nausea / Dysphagia - consult SLP, zofran 4mg q6h PRN, miralax 17grams BID, senna 8.6mg BID  #Fracture of L5 vertebra & L sacral ala / OA - acetaminophen 500mg q4h PRN, lidocaine patch daily, tramadol 50mg BID, turmeric 1000mg BID, BlueEmu BID and BID PRN, f/u w/ Jaclyn Hicks  #Anemia / Vitamin deficiency - vitamin E 400units daily, zinc 50mg daily, calcium 250mg daily, vitamin B12 1mg weekly, biotin 1mg daily, ferrous sulfate 325mg BID  #HTN / HLD - fish oil 1000mg daily, amlodipine 10mg daily, pravastatin 20mg daily, ASA 81mg daily, carvedilol 12.5mg BID  #UTI prophylaxis - cranberry 600mg daily  #Allergic rhinitis - zyrtec 10mg daily, saline gel BID  #Insomnia - melatonin 3mg QHS PRN  #ACP - DNRCC-A  Follow up as needed      *Chief Complaint*     edema    *History of Present Illness*    Patient is a 89 y/o female LTC resident of [facility] w/ PMH as below who is seen today for f/u and management of multiple medical problems.     She developed conjuncivitis, seb derm, labs appreciated as below w/ worsening renal function, refusing dialysis.    She is seen sitting up in her room today and denies f/c, sweats, some fatigue, no itching, some edema, improved, no CP, SOB, cough, n/v, pain controlled, no other new c/o presently.     [place]iges - Azithromycin, Codeine, Penicillin, Cipro, Sulfa Antibiotics, Corn   PMH - hypertension, hyperlipidemia, anemia, CKD, DVT, anemia, OA, PVD, CAD,   PSH -  hip surgery, appendectomy, marsupialization of ovarian cyst, HOMERO  FH - Brother had A-fib  SocHx - Never smoker, No  EtOH    *Review of Systems*  All other systems reviewed are negative except as noted in the HPI     *Vital Signs*   Date: 8/17/24  - T: 97.4  P: 71  R: 18  BP: 128/77  SpO2: 97% on RA     *Results / Data*  CBC - Date: 8/2/24  WBC: 5.85  HGB: 8.0  HCT: 26.7  PLT: 192  ;   BMP - Date: 8/14/24  Na: 138  K: 4.9  Cl: 101  CO2: 23  BUN: 102  Cr: 3.01  Glu: 84  Ca: 8.4  Phos: 4.3  Alb: 3.7  ;   LFT - Date: 8/2/24  AST: 26  ALT: 14  ALP: 64  Tbili: <0.2  ;   Coags - Date:   INR:   PT:  Other - Date: 9/14/23  Iron: 34  TIBC: 186  B12: >2000  Folate: >20.0  Ferritin: >2000  Retic: 2.5%; 10/2/23  Iron: 25  TIBC: 190  Ferritin: 1455  B12: >2000; 11/8/23  Ferritin: 1165  Iron: 31 ; 8/14/24  BNP: 335    *Physical Exam*  Gen: (+) NAD, (+) well-appearing  HEENT: (+) normocephalic, (+) MMM  Neck: (+) supple  Lungs: (+) CTAB, (-) wheezes, (-) rales, (-) rhonchi  Heart: (+) RRR, (+) S1 S2, (+) 1/6  Pulses: (+) palpable  Abd: (+) soft, (+) NT, (+) ND, (+) BS+  Ext: (-) edema, (-) deformity  MSK: (-) joint swelling  Skin: (+) warm, (+) dry, (-) rash  Neuro: (+) follows commands, (-) tremor, (+) alert

## 2024-09-05 ENCOUNTER — NURSING HOME VISIT (OUTPATIENT)
Dept: POST ACUTE CARE | Facility: EXTERNAL LOCATION | Age: 89
End: 2024-09-05
Payer: COMMERCIAL

## 2024-09-05 DIAGNOSIS — M17.12 PRIMARY OSTEOARTHRITIS OF LEFT KNEE: ICD-10-CM

## 2024-09-05 DIAGNOSIS — M17.11 PRIMARY OSTEOARTHRITIS OF RIGHT KNEE: ICD-10-CM

## 2024-09-05 DIAGNOSIS — N18.5 CKD (CHRONIC KIDNEY DISEASE) STAGE 5, GFR LESS THAN 15 ML/MIN (MULTI): ICD-10-CM

## 2024-09-05 PROCEDURE — 99309 SBSQ NF CARE MODERATE MDM 30: CPT | Performed by: FAMILY MEDICINE

## 2024-09-05 NOTE — LETTER
Patient: Camelia Jones  : 1934    Encounter Date: 2024    Resident seen 24 -- MP    CC: [facility] ([facility]) Recheck    : 1934  SNF H&P done 23, 23 (EPIC)  Allergy: Azithro, Codeine, PCN, Cipro, Sulfa, Corn  DNR-CCA, NO HD    S: 89 yo woman with HLD, CKD4, HTN, OA anemia and hyponatremia. C/O right eye pain and redness. Med List & Problem list reviewed. No cp/sob.    O: VSS AFEB 131# (up 1#) Right inferior lid hordeolum. A&O NAD. Chest cta. heart rrr,  BRIDGET. Ext left LE Edema 2+ > Right 1+.    LAB (8/15/24) , Alb 3.7, Cr 3.01, GFR 14, K 4.9, Na 138  (2024) Hgb 9, , HDL 52, LDL 71  (23) Ferritin 1165, Iron 25->31, TIBC 190    (23) Urine Electrophoresis -- No M Protein. Urine osmolality 513.    CXR (6/10/24) Clear (no infiltrate, no CHF)    24 PROCEDURE: verbal consent obtained. B/L knee lateral joint aspiration & injection of 3 cc 2% lidocaine+ kenalog 60. Pt tolerated well.    A/P:  # Hx Right eye stye: resolved.  # Muscle weakness and gait instability: Largely Wheelchair bound. Walks with walker at times. Ok to order light weight/smaller walker Rx provided.  # Cough d/t PND: ayr spray. refuses flonase.  # Left>Right knee OA: improved with topical blue emu and CSI 24 -- next due no sooner than 24. Agrees to try lidocaine patch 1/2 each knee on 12h off 12h.  # Hx Hyponatremia: stable OFF salt tablet. Monitor levels. Continue FR to 1800 cc 24.  # LE Edema: stable on Torsemide 20 mg daily. No sign of CHF. CAYETANO hose. Suspect chronic lymphedema as remains present even with significant weight loss.  # Lumbar spondylosis and hx L5 & Sacral Ala fx: pain control inadequate with tylenol. Pt refuses oxycodone and tramadol. Completed SNF Rehab. LTC. F/U with ortho. Lidoderm patch.  # Depression and Chronic pain: patient declines offer of cymbalta 30 mg qam.  # B/L heel pain c/w PF -- night splints.  # HTN: stable on norvasc 10, Coreg 12.5 bid.  # HLD:  statin, ASA  # Anemia: improving after B12 1mg IM weekly x 8. Iron stores replete. OFF ferrous sulfate 11/2/23 to help with constipation. May benefit from EPO if does not climb above 10.  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 F/U Nephro [facility]. NO HD.  # Insomnia: Patient declines Ambien, melatonin and tramadol. Offer herbal tea, capsaicin cream.  # Hx Constipation: OFF tums, Ca, & Fe. Continue colace & miralax.      Electronically Signed By: Trav Rodriguez MD   9/22/24  2:17 PM

## 2024-09-12 ENCOUNTER — NURSING HOME VISIT (OUTPATIENT)
Dept: POST ACUTE CARE | Facility: EXTERNAL LOCATION | Age: 89
End: 2024-09-12
Payer: COMMERCIAL

## 2024-09-12 DIAGNOSIS — R60.9 EDEMA, UNSPECIFIED TYPE: ICD-10-CM

## 2024-09-12 DIAGNOSIS — M19.90 OSTEOARTHRITIS, UNSPECIFIED OSTEOARTHRITIS TYPE, UNSPECIFIED SITE: ICD-10-CM

## 2024-09-12 PROCEDURE — 99308 SBSQ NF CARE LOW MDM 20: CPT | Performed by: FAMILY MEDICINE

## 2024-09-12 NOTE — LETTER
Patient: Camelia Jones  : 1934    Encounter Date: 2024    Resident seen 24 -- MP    CC: [facility] ([facility] Recheck    : 1934  SNF H&P done 23, 23 (EPIC)  Allergy: Azithro, Codeine, PCN, Cipro, Sulfa, Corn  DNR-CCA, NO HD    S: 89 yo woman with HLD, CKD4, HTN, OA anemia and hyponatremia. C/O right eye pain and redness. Med List & Problem list reviewed. C/O persistent LE eedema. No cp/sob.    O: VSS AFEB 131# (24) Right inferior lid hordeolum. A&O NAD. Chest cta. heart rrr,  BRIDGET. Ext left LE Edema 2+ > Right 1+.    LAB (8/15/24) , Alb 3.7, Cr 3.01, GFR 14, K 4.9, Na 138  (2024) Hgb 9, , HDL 52, LDL 71  (23) Ferritin 1165, Iron 25->31, TIBC 190    (23) Urine Electrophoresis -- No M Protein. Urine osmolality 513.    CXR (6/10/24) Clear (no infiltrate, no CHF)    24 PROCEDURE: verbal consent obtained. B/L knee lateral joint aspiration & injection of 3 cc 2% lidocaine+ kenalog 60. Pt tolerated well.    A/P:  # Muscle weakness and gait instability: Largely Wheelchair bound. Walks with walker at times. Ok to order light weight/smaller walker Rx provided.  # Cough d/t PND: ayr spray. refuses flonase.  # Left>Right knee OA: improved with topical blue emu and CSI 24 -- next due no sooner than 24. Agrees to try lidocaine patch 1/2 each knee on 12h off 12h.  # Hx Hyponatremia: stable OFF salt tablet. Monitor levels. Continue FR to 1800 cc 24.  # LE Edema: stable on Torsemide 20 mg daily. No sign of CHF. Refuses tight CAYETANO hose -- order dermasavers. Suspect chronic lymphedema as remains present even with significant weight loss.  # Lumbar spondylosis and hx L5 & Sacral Ala fx: pain control inadequate with tylenol. Pt refuses oxycodone and tramadol. Completed SNF Rehab. LTC. F/U with ortho. Lidoderm patch.  # Depression and Chronic pain: patient declines offer of cymbalta 30 mg qam.  # B/L heel pain c/w PF -- night splints.  # HTN: stable on norvasc  10, Coreg 12.5 bid.  # HLD: statin, ASA  # Anemia: improving after B12 1mg IM weekly x 8. Iron stores replete. OFF ferrous sulfate 11/2/23 to help with constipation. May benefit from EPO if does not climb above 10.  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 F/U Nephro [facility]. NO HD.  # Insomnia: Patient declines Ambien, melatonin and tramadol. Offer herbal tea, capsaicin cream.  # Hx Constipation: OFF tums, Ca, & Fe. Continue colace & miralax.      Electronically Signed By: Trav Rodriguez MD   9/22/24  2:18 PM

## 2024-09-12 NOTE — PROGRESS NOTES
Resident seen 24 -- MP    CC: [facility] ([facility] Recheck    : 1934  SNF H&P done 23, 23 (EPIC)  Allergy: Azithro, Codeine, PCN, Cipro, Sulfa, Corn  DNR-CCA, NO HD    S: 89 yo woman with HLD, CKD4, HTN, OA anemia and hyponatremia. C/O right eye pain and redness. Med List & Problem list reviewed. No cp/sob.    O: VSS AFEB 131# (up 1#) Right inferior lid hordeolum. A&O NAD. Chest cta. heart rrr,  BRIDGET. Ext left LE Edema 2+ > Right 1+.    LAB (8/15/24) , Alb 3.7, Cr 3.01, GFR 14, K 4.9, Na 138  (2024) Hgb 9, , HDL 52, LDL 71  (23) Ferritin 1165, Iron 25->31, TIBC 190    (23) Urine Electrophoresis -- No M Protein. Urine osmolality 513.    CXR (6/10/24) Clear (no infiltrate, no CHF)    24 PROCEDURE: verbal consent obtained. B/L knee lateral joint aspiration & injection of 3 cc 2% lidocaine+ kenalog 60. Pt tolerated well.    A/P:  # Hx Right eye stye: resolved.  # Muscle weakness and gait instability: Largely Wheelchair bound. Walks with walker at times. Ok to order light weight/smaller walker Rx provided.  # Cough d/t PND: ayr spray. refuses flonase.  # Left>Right knee OA: improved with topical blue emu and CSI 24 -- next due no sooner than 24. Agrees to try lidocaine patch 1/2 each knee on 12h off 12h.  # Hx Hyponatremia: stable OFF salt tablet. Monitor levels. Continue FR to 1800 cc 24.  # LE Edema: stable on Torsemide 20 mg daily. No sign of CHF. CAYETANO hose. Suspect chronic lymphedema as remains present even with significant weight loss.  # Lumbar spondylosis and hx L5 & Sacral Ala fx: pain control inadequate with tylenol. Pt refuses oxycodone and tramadol. Completed SNF Rehab. LTC. F/U with ortho. Lidoderm patch.  # Depression and Chronic pain: patient declines offer of cymbalta 30 mg qam.  # B/L heel pain c/w PF -- night splints.  # HTN: stable on norvasc 10, Coreg 12.5 bid.  # HLD: statin, ASA  # Anemia: improving after B12 1mg IM weekly x 8. Iron stores  replete. OFF ferrous sulfate 11/2/23 to help with constipation. May benefit from EPO if does not climb above 10.  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 F/U Nephro [facility]. NO HD.  # Insomnia: Patient declines Ambien, melatonin and tramadol. Offer herbal tea, capsaicin cream.  # Hx Constipation: OFF tums, Ca, & Fe. Continue colace & miralax.

## 2024-09-17 NOTE — PROGRESS NOTES
Resident seen 24 -- MP    CC: [facility] ([facility]) Recheck    : 1934  SNF H&P done 23, 23 (EPIC)  Allergy: Azithro, Codeine, PCN, Cipro, Sulfa, Corn  DNR-CCA, NO HD    S: 89 yo woman with HLD, CKD4, HTN, OA anemia and hyponatremia. C/O right eye pain and redness. Med List & Problem list reviewed. C/O persistent LE eedema. No cp/sob.    O: VSS AFEB 131# (24) Right inferior lid hordeolum. A&O NAD. Chest cta. heart rrr,  BRIDGET. Ext left LE Edema 2+ > Right 1+.    LAB (8/15/24) , Alb 3.7, Cr 3.01, GFR 14, K 4.9, Na 138  (2024) Hgb 9, , HDL 52, LDL 71  (23) Ferritin 1165, Iron 25->31, TIBC 190    (23) Urine Electrophoresis -- No M Protein. Urine osmolality 513.    CXR (6/10/24) Clear (no infiltrate, no CHF)    24 PROCEDURE: verbal consent obtained. B/L knee lateral joint aspiration & injection of 3 cc 2% lidocaine+ kenalog 60. Pt tolerated well.    A/P:  # Muscle weakness and gait instability: Largely Wheelchair bound. Walks with walker at times. Ok to order light weight/smaller walker Rx provided.  # Cough d/t PND: ayr spray. refuses flonase.  # Left>Right knee OA: improved with topical blue emu and CSI 24 -- next due no sooner than 24. Agrees to try lidocaine patch 1/2 each knee on 12h off 12h.  # Hx Hyponatremia: stable OFF salt tablet. Monitor levels. Continue FR to 1800 cc 24.  # LE Edema: stable on Torsemide 20 mg daily. No sign of CHF. Refuses tight CAYETANO hose -- order dermasavers. Suspect chronic lymphedema as remains present even with significant weight loss.  # Lumbar spondylosis and hx L5 & Sacral Ala fx: pain control inadequate with tylenol. Pt refuses oxycodone and tramadol. Completed SNF Rehab. LTC. F/U with ortho. Lidoderm patch.  # Depression and Chronic pain: patient declines offer of cymbalta 30 mg qam.  # B/L heel pain c/w PF -- night splints.  # HTN: stable on norvasc 10, Coreg 12.5 bid.  # HLD: statin, ASA  # Anemia: improving after B12  1mg IM weekly x 8. Iron stores replete. OFF ferrous sulfate 11/2/23 to help with constipation. May benefit from EPO if does not climb above 10.  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 F/U Nephro [facility]. NO HD.  # Insomnia: Patient declines Ambien, melatonin and tramadol. Offer herbal tea, capsaicin cream.  # Hx Constipation: OFF tums, Ca, & Fe. Continue colace & miralax.

## 2024-09-26 ENCOUNTER — NURSING HOME VISIT (OUTPATIENT)
Dept: POST ACUTE CARE | Facility: EXTERNAL LOCATION | Age: 89
End: 2024-09-26
Payer: COMMERCIAL

## 2024-09-26 DIAGNOSIS — N18.5 CKD (CHRONIC KIDNEY DISEASE) STAGE 5, GFR LESS THAN 15 ML/MIN (MULTI): ICD-10-CM

## 2024-09-26 DIAGNOSIS — S32.010A CLOSED COMPRESSION FRACTURE OF BODY OF L1 VERTEBRA (MULTI): ICD-10-CM

## 2024-09-26 DIAGNOSIS — R11.0 NAUSEA: ICD-10-CM

## 2024-09-26 DIAGNOSIS — E78.5 DYSLIPIDEMIA: ICD-10-CM

## 2024-09-26 DIAGNOSIS — R63.5 WEIGHT GAIN: Primary | ICD-10-CM

## 2024-09-26 DIAGNOSIS — D64.9 ANEMIA, UNSPECIFIED TYPE: ICD-10-CM

## 2024-09-26 DIAGNOSIS — R60.9 EDEMA, UNSPECIFIED TYPE: ICD-10-CM

## 2024-09-26 DIAGNOSIS — E56.9 VITAMIN DEFICIENCY: ICD-10-CM

## 2024-09-26 DIAGNOSIS — S32.2XXD CLOSED FRACTURE OF SACRUM AND COCCYX WITH ROUTINE HEALING, SUBSEQUENT ENCOUNTER: ICD-10-CM

## 2024-09-26 DIAGNOSIS — S32.059D CLOSED FRACTURE OF FIFTH LUMBAR VERTEBRA WITH ROUTINE HEALING, UNSPECIFIED FRACTURE MORPHOLOGY, SUBSEQUENT ENCOUNTER: ICD-10-CM

## 2024-09-26 DIAGNOSIS — L21.9 SEBORRHEIC DERMATITIS: ICD-10-CM

## 2024-09-26 DIAGNOSIS — I10 HYPERTENSION, UNSPECIFIED TYPE: ICD-10-CM

## 2024-09-26 DIAGNOSIS — K59.00 CONSTIPATION, UNSPECIFIED CONSTIPATION TYPE: ICD-10-CM

## 2024-09-26 DIAGNOSIS — R13.10 DYSPHAGIA, UNSPECIFIED TYPE: ICD-10-CM

## 2024-09-26 DIAGNOSIS — S32.10XD CLOSED FRACTURE OF SACRUM AND COCCYX WITH ROUTINE HEALING, SUBSEQUENT ENCOUNTER: ICD-10-CM

## 2024-09-26 DIAGNOSIS — E87.1 HYPONATREMIA: ICD-10-CM

## 2024-09-26 PROCEDURE — 99309 SBSQ NF CARE MODERATE MDM 30: CPT | Performed by: NURSE PRACTITIONER

## 2024-10-01 NOTE — PROGRESS NOTES
*Provider Impression*    Patient is a 89 year old female who is seen today for management of multiple medical problems       #Weight gain / CKD / Edema / Hyponatremia - f/u w/ Dr. Bernard, torsemide 20mg daily, NaCl 1gram BID, renal support drink daily, fluid restriction 1800mL daily; check CBC, CMP, TSH, BNP in am  #Seborrheic dermatitis - ketoconazole shampoo  #Constipation / Nausea / Dysphagia - consult SLP, zofran 4mg q6h PRN, miralax 17grams BID, senna 8.6mg BID  #Fracture of L5 vertebra & L sacral ala / OA - acetaminophen 500mg q4h PRN, lidocaine patch daily, tramadol 50mg BID, turmeric 1000mg BID, BlueEmu BID and BID PRN, f/u w/ Jaclyn Hicks  #Anemia / Vitamin deficiency - vitamin E 400units daily, zinc 50mg daily, calcium 250mg daily, vitamin B12 1mg weekly, biotin 1mg daily, ferrous sulfate 325mg BID  #HTN / HLD - fish oil 1000mg daily, amlodipine 10mg daily, pravastatin 20mg daily, ASA 81mg daily, carvedilol 12.5mg BID  #UTI prophylaxis - cranberry 600mg daily  #Allergic rhinitis - zyrtec 10mg daily, saline gel BID  #Insomnia - melatonin 3mg QHS PRN  #ACP - DNRCC-A  Follow up as needed      *Chief Complaint*     edema    *History of Present Illness*    Patient is a 89 y/o female LTC resident of [facility] w/ [facility] as below who is seen today for f/u and management of multiple medical problems.     She is seen sitting up in her room today and denies any SOB, CP, f/c, xweats, n/v, constipation, diarrhea, no hair skin nail changes, edema is worse, no other new c/o presently.     Alleriges - Azithromycin, Codeine, Penicillin, Cipro, Sulfa Antibiotics, Corn   PMH - hypertension, hyperlipidemia, anemia, CKD, DVT, anemia, OA, PVD, CAD,   PSH -  hip surgery, appendectomy, marsupialization of ovarian cyst, HOMERO  FH - Brother had A-fib  SocHx - Never smoker, No EtOH    *Review of Systems*  All other systems reviewed are negative except as noted in the HPI     *Vital Signs*   Date: 9/15/24  - T: 97.4  P: 68  R: 16   BP: 104/61  SpO2: % on RA ; Date: 9/23/24 - Wt: 133       *Results / Data*  CBC - Date: 8/2/24  WBC: 5.85  HGB: 8.0  HCT: 26.7  PLT: 192  ;   BMP - Date: 8/14/24  Na: 138  K: 4.9  Cl: 101  CO2: 23  BUN: 102  Cr: 3.01  Glu: 84  Ca: 8.4  Phos: 4.3  Alb: 3.7  ;   LFT - Date: 8/2/24  AST: 26  ALT: 14  ALP: 64  Tbili: <0.2  ;   Coags - Date:   INR:   PT:  Other - Date: 9/14/23  Iron: 34  TIBC: 186  B12: >2000  Folate: >20.0  Ferritin: >2000  Retic: 2.5%; 10/2/23  Iron: 25  TIBC: 190  Ferritin: 1455  B12: >2000; 11/8/23  Ferritin: 1165  Iron: 31 ; 8/14/24  BNP: 335    *Physical Exam*  Gen: (+) NAD, (+) well-appearing  HEENT: (+) normocephalic, (+) MMM  Neck: (+) supple  Lungs: (+) CTAB, (-) wheezes, (-) rales, (-) rhonchi  Heart: (+) RRR, (+) S1 S2, (+) 1/6  Pulses: (+) palpable  Abd: (+) soft, (+) NT, (+) ND, (+) BS+  Ext: (-) edema, (-) deformity  MSK: (-) joint swelling  Skin: (+) warm, (+) dry, (-) rash  Neuro: (+) follows commands, (-) tremor, (+) alert

## 2024-10-03 ENCOUNTER — NURSING HOME VISIT (OUTPATIENT)
Dept: POST ACUTE CARE | Facility: EXTERNAL LOCATION | Age: 89
End: 2024-10-03
Payer: COMMERCIAL

## 2024-10-03 DIAGNOSIS — M17.11 PRIMARY OSTEOARTHRITIS OF RIGHT KNEE: ICD-10-CM

## 2024-10-03 DIAGNOSIS — M17.12 PRIMARY OSTEOARTHRITIS OF LEFT KNEE: ICD-10-CM

## 2024-10-03 DIAGNOSIS — I50.9 CONGESTIVE HEART FAILURE, UNSPECIFIED HF CHRONICITY, UNSPECIFIED HEART FAILURE TYPE: ICD-10-CM

## 2024-10-03 DIAGNOSIS — I10 PRIMARY HYPERTENSION: ICD-10-CM

## 2024-10-03 PROCEDURE — 99309 SBSQ NF CARE MODERATE MDM 30: CPT | Performed by: FAMILY MEDICINE

## 2024-10-03 NOTE — LETTER
Patient: Camelia Jones  : 1934    Encounter Date: 10/03/2024    Resident seen 10/3/24 -- MP    CC: LTC ([facility]) Recheck    : 1934  SNF H&P done 23, 23 (EPIC)  Allergy: Azithro, Codeine, PCN, Cipro, Sulfa, Corn  DNR-CCA, NO HD    S: 89 yo woman with HLD, CKD4, HTN, OA anemia and hyponatremia. Med List & Problem list reviewed. C/O persistent LE edema -- tolerating dermasavers to legs. No cp/sob.    O: VSS AFEB 134# (up 3#) A&O NAD. Chest cta. heart rrr, 2/6 BRIDGET. Ext left LE Edema 2+ > Right 1+.    LAB (24) ->6152, Alb 3.7, Cr 2.9, GFR 15, K 4.8, Na 140, TSH 3.93, Hgb 8.3, Alb 4.1, CO2 17.  (2024) , HDL 52, LDL 71  (23) Ferritin 1165, Iron 25->31, TIBC 190    (23) Urine Electrophoresis -- No M Protein. Urine osmolality 513.    CXR (6/10/24) Clear (no infiltrate, no CHF)    24 PROCEDURE: verbal consent obtained. B/L knee lateral joint aspiration & injection of 3 cc 2% lidocaine+ kenalog 60. Pt tolerated well.    A/P:  # Muscle weakness and gait instability: Largely Wheelchair bound. Walks with walker at times. Ok to order light weight/smaller walker Rx provided.  # Cough d/t PND: ayr spray. refuses flonase.  # Left>Right knee OA: improved with topical blue emu and CSI 24 -- next due no sooner than 24. Agrees to try lidocaine patch 1/2 each knee on 12h off 12h.  # Hx Hyponatremia: resolved OFF salt tablet. Monitor levels. Continue FR to 1800 cc 24.  # CHF/Edema: agree with additional Torsemide 20 mg daily x 1 week Refuses tight CAYETANO hose -- order dermasavers.  # Lumbar spondylosis and hx L5 & Sacral Ala fx: pain control inadequate with tylenol. Pt refuses oxycodone and tramadol. Completed SNF Rehab. LTC. F/U with ortho. Lidoderm patch.  # Depression and Chronic pain: patient declines offer of cymbalta 30 mg qam.  # B/L heel pain c/w PF -- night splints.  # HTN: stable -- cut norvasc to 5, Coreg 12.5 bid.  # HLD: statin, ASA  # Anemia: persistent  despite B12 replenishment. Iron stores replete. OFF ferrous sulfate 11/2/23 to help with constipation. May benefit from EPO if does not climb above 10.  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 F/U Nephro Rosplock. NO HD per patients expressed advance directives. Agree with NaHCO3 for metabolic acidosis.  # Insomnia: Patient declines Ambien, melatonin and tramadol. Offer herbal tea, capsaicin cream.  # Hx Constipation: OFF tums, Ca, & Fe. Continue colace & miralax.      Electronically Signed By: Trav Rodriguez MD   10/29/24  9:29 PM

## 2024-10-09 PROBLEM — I50.9 CONGESTIVE HEART FAILURE: Status: ACTIVE | Noted: 2024-10-09

## 2024-10-09 NOTE — PROGRESS NOTES
Resident seen 10/3/24 -- MP    CC: LT ([facility]) Recheck    : 1934  SNF H&P done 23, 23 (EPIC)  Allergy: Azithro, Codeine, PCN, Cipro, Sulfa, Corn  DNR-CCA, NO HD    S: 89 yo woman with HLD, CKD4, HTN, OA anemia and hyponatremia. Med List & Problem list reviewed. C/O persistent LE edema -- tolerating dermasavers to legs. No cp/sob.    O: VSS AFEB 134# (up 3#) A&O NAD. Chest cta. heart rrr, 2 BRIDGET. Ext left LE Edema 2+ > Right 1+.    LAB (24) ->6152, Alb 3.7, Cr 2.9, GFR 15, K 4.8, Na 140, TSH 3.93, Hgb 8.3, Alb 4.1, CO2 17.  (2024) , HDL 52, LDL 71  (23) Ferritin 1165, Iron 25->31, TIBC 190    (23) Urine Electrophoresis -- No M Protein. Urine osmolality 513.    CXR (6/10/24) Clear (no infiltrate, no CHF)    24 PROCEDURE: verbal consent obtained. B/L knee lateral joint aspiration & injection of 3 cc 2% lidocaine+ kenalog 60. Pt tolerated well.    A/P:  # Muscle weakness and gait instability: Largely Wheelchair bound. Walks with walker at times. Ok to order light weight/smaller walker Rx provided.  # Cough d/t PND: ayr spray. refuses flonase.  # Left>Right knee OA: improved with topical blue emu and CSI 24 -- next due no sooner than 24. Agrees to try lidocaine patch 1/2 each knee on 12h off 12h.  # Hx Hyponatremia: resolved OFF salt tablet. Monitor levels. Continue FR to 1800 cc 24.  # CHF/Edema: agree with additional Torsemide 20 mg daily x 1 week Refuses tight CAYETANO hose -- order dermasavers.  # Lumbar spondylosis and hx L5 & Sacral Ala fx: pain control inadequate with tylenol. Pt refuses oxycodone and tramadol. Completed SNF Rehab. LTC. F/U with ortho. Lidoderm patch.  # Depression and Chronic pain: patient declines offer of cymbalta 30 mg qam.  # B/L heel pain c/w PF -- night splints.  # HTN: stable -- cut norvasc to 5, Coreg 12.5 bid.  # HLD: statin, ASA  # Anemia: persistent despite B12 replenishment. Iron stores replete. OFF ferrous sulfate  11/2/23 to help with constipation. May benefit from EPO if does not climb above 10.  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 F/U Nephro Rosplock. NO HD per patients expressed advance directives. Agree with NaHCO3 for metabolic acidosis.  # Insomnia: Patient declines Ambien, melatonin and tramadol. Offer herbal tea, capsaicin cream.  # Hx Constipation: OFF tums, Ca, & Fe. Continue colace & miralax.

## 2024-10-23 ENCOUNTER — NURSING HOME VISIT (OUTPATIENT)
Dept: POST ACUTE CARE | Facility: EXTERNAL LOCATION | Age: 89
End: 2024-10-23
Payer: COMMERCIAL

## 2024-10-23 DIAGNOSIS — S32.059D CLOSED FRACTURE OF FIFTH LUMBAR VERTEBRA WITH ROUTINE HEALING, UNSPECIFIED FRACTURE MORPHOLOGY, SUBSEQUENT ENCOUNTER: ICD-10-CM

## 2024-10-23 DIAGNOSIS — L21.9 SEBORRHEIC DERMATITIS: ICD-10-CM

## 2024-10-23 DIAGNOSIS — S32.2XXD CLOSED FRACTURE OF SACRUM AND COCCYX WITH ROUTINE HEALING, SUBSEQUENT ENCOUNTER: ICD-10-CM

## 2024-10-23 DIAGNOSIS — N18.5 CKD (CHRONIC KIDNEY DISEASE) STAGE 5, GFR LESS THAN 15 ML/MIN (MULTI): Primary | ICD-10-CM

## 2024-10-23 DIAGNOSIS — K59.00 CONSTIPATION, UNSPECIFIED CONSTIPATION TYPE: ICD-10-CM

## 2024-10-23 DIAGNOSIS — I10 HYPERTENSION, UNSPECIFIED TYPE: ICD-10-CM

## 2024-10-23 DIAGNOSIS — E78.5 DYSLIPIDEMIA: ICD-10-CM

## 2024-10-23 DIAGNOSIS — S32.10XD CLOSED FRACTURE OF SACRUM AND COCCYX WITH ROUTINE HEALING, SUBSEQUENT ENCOUNTER: ICD-10-CM

## 2024-10-23 DIAGNOSIS — R13.10 DYSPHAGIA, UNSPECIFIED TYPE: ICD-10-CM

## 2024-10-23 DIAGNOSIS — R11.0 NAUSEA: ICD-10-CM

## 2024-10-23 DIAGNOSIS — S32.010A CLOSED COMPRESSION FRACTURE OF BODY OF L1 VERTEBRA (MULTI): ICD-10-CM

## 2024-10-23 DIAGNOSIS — R60.9 EDEMA, UNSPECIFIED TYPE: ICD-10-CM

## 2024-10-23 DIAGNOSIS — D64.9 ANEMIA, UNSPECIFIED TYPE: ICD-10-CM

## 2024-10-23 DIAGNOSIS — E56.9 VITAMIN DEFICIENCY: ICD-10-CM

## 2024-10-23 PROCEDURE — 99309 SBSQ NF CARE MODERATE MDM 30: CPT | Performed by: NURSE PRACTITIONER

## 2024-11-05 ENCOUNTER — NURSING HOME VISIT (OUTPATIENT)
Dept: POST ACUTE CARE | Facility: EXTERNAL LOCATION | Age: 89
End: 2024-11-05
Payer: COMMERCIAL

## 2024-11-05 DIAGNOSIS — I10 HYPERTENSION, UNSPECIFIED TYPE: ICD-10-CM

## 2024-11-05 DIAGNOSIS — S32.10XD CLOSED FRACTURE OF SACRUM AND COCCYX WITH ROUTINE HEALING, SUBSEQUENT ENCOUNTER: ICD-10-CM

## 2024-11-05 DIAGNOSIS — D64.9 ANEMIA, UNSPECIFIED TYPE: ICD-10-CM

## 2024-11-05 DIAGNOSIS — N18.5 CKD (CHRONIC KIDNEY DISEASE) STAGE 5, GFR LESS THAN 15 ML/MIN (MULTI): ICD-10-CM

## 2024-11-05 DIAGNOSIS — S32.059D CLOSED FRACTURE OF FIFTH LUMBAR VERTEBRA WITH ROUTINE HEALING, UNSPECIFIED FRACTURE MORPHOLOGY, SUBSEQUENT ENCOUNTER: ICD-10-CM

## 2024-11-05 DIAGNOSIS — R11.0 NAUSEA: ICD-10-CM

## 2024-11-05 DIAGNOSIS — R13.10 DYSPHAGIA, UNSPECIFIED TYPE: ICD-10-CM

## 2024-11-05 DIAGNOSIS — S32.2XXD CLOSED FRACTURE OF SACRUM AND COCCYX WITH ROUTINE HEALING, SUBSEQUENT ENCOUNTER: ICD-10-CM

## 2024-11-05 DIAGNOSIS — R60.9 EDEMA, UNSPECIFIED TYPE: ICD-10-CM

## 2024-11-05 DIAGNOSIS — K59.00 CONSTIPATION, UNSPECIFIED CONSTIPATION TYPE: ICD-10-CM

## 2024-11-05 DIAGNOSIS — L21.9 SEBORRHEIC DERMATITIS: ICD-10-CM

## 2024-11-05 DIAGNOSIS — E78.5 DYSLIPIDEMIA: ICD-10-CM

## 2024-11-05 DIAGNOSIS — M17.12 PRIMARY OSTEOARTHRITIS OF LEFT KNEE: ICD-10-CM

## 2024-11-05 DIAGNOSIS — E56.9 VITAMIN DEFICIENCY: ICD-10-CM

## 2024-11-05 DIAGNOSIS — N30.00 ACUTE CYSTITIS WITHOUT HEMATURIA: Primary | ICD-10-CM

## 2024-11-05 PROCEDURE — 99309 SBSQ NF CARE MODERATE MDM 30: CPT | Performed by: NURSE PRACTITIONER

## 2024-11-07 ENCOUNTER — NURSING HOME VISIT (OUTPATIENT)
Dept: POST ACUTE CARE | Facility: EXTERNAL LOCATION | Age: 89
End: 2024-11-07
Payer: COMMERCIAL

## 2024-11-07 DIAGNOSIS — I50.9 CONGESTIVE HEART FAILURE, UNSPECIFIED HF CHRONICITY, UNSPECIFIED HEART FAILURE TYPE: ICD-10-CM

## 2024-11-07 DIAGNOSIS — N18.5 CKD (CHRONIC KIDNEY DISEASE) STAGE 5, GFR LESS THAN 15 ML/MIN (MULTI): ICD-10-CM

## 2024-11-07 DIAGNOSIS — R31.9 URINARY TRACT INFECTION WITH HEMATURIA, SITE UNSPECIFIED: ICD-10-CM

## 2024-11-07 DIAGNOSIS — N39.0 URINARY TRACT INFECTION WITH HEMATURIA, SITE UNSPECIFIED: ICD-10-CM

## 2024-11-07 PROCEDURE — 99309 SBSQ NF CARE MODERATE MDM 30: CPT | Performed by: FAMILY MEDICINE

## 2024-11-07 NOTE — LETTER
Patient: Camelia Jones  : 1934    Encounter Date: 2024    Resident seen 24 -- MP    CC: LTC ([facility]) Recheck    : 1934  SNF H&P done 23, 23 (EPIC)  Allergy: Azithro, Codeine, PCN, Cipro, Sulfa, Corn  DNR-CCA, NO HD    S: 89 yo woman with HLD, CKD4, HTN, OA anemia and hyponatremia. Med List & Problem list reviewed. On abx for UTI. No cp/sob.    O: VSS AFEB 125# (down 9#) A&O NAD. Chest cta. heart rrr, 2/6 BRIDGET. Ext left LE Edema 2+ > Right 1+.    LAB (24) Hgb 9.4->8.7,  (10/31/24) Cr 2.81, GFR 15, K 4.2, Na 142, TSH 3.93, Alb 4.1, CO2 17->24  (10/23/24) Alb 3.4, ->6152->560  (2024) , HDL 52, LDL 71  (23) Ferritin 1165, Iron 25->31, TIBC 190  (23) Urine Electrophoresis -- No M Protein. Urine osmolality 513.    CXR (6/10/24) Clear (no infiltrate, no CHF)    24 PROCEDURE: verbal consent obtained. B/L knee lateral joint aspiration & injection of 3 cc 2% lidocaine+ kenalog 60. Pt tolerated well.    A/P:  # UTI: improving on Abx.  # Muscle weakness and gait instability: Largely Wheelchair bound. Walks with walker at times. Ok to order light weight/smaller walker Rx provided.  # Cough d/t PND: ayr spray. refuses flonase.  # Left>Right knee OA: improved with topical blue emu and CSI 24 -- next due no sooner than 24. Agrees to try lidocaine patch 1/2 each knee on 12h off 12h.  # Hx Hyponatremia: resolved OFF salt tablet. Monitor levels. Continue FR to 1800 cc 24.  # CHF/Edema: stable on torsemide 20 mg galvez.  # Lumbar spondylosis and hx L5 & Sacral Ala fx: pain control inadequate with tylenol. Pt refuses oxycodone and tramadol. Completed SNF Rehab. LTC. F/U with ortho. Lidoderm patch.  # Depression and Chronic pain: patient declines offer of cymbalta 30 mg qam.  # B/L heel pain c/w PF -- night splints.  # HTN: stable -- cut norvasc to 5, Coreg 12.5 bid.  # HLD: statin, ASA  # Anemia: persistent despite B12 replenishment. Iron stores replete.  OFF ferrous sulfate 11/2/23 to help with constipation. May benefit from EPO if does not climb above 10.  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 F/U Nephro Rosplock. NO HD per patients expressed advance directives. Stable on NaHCO3 for metabolic acidosis.  # Insomnia: Patient declines Ambien, melatonin and tramadol. Offer herbal tea, capsaicin cream.  # Hx Constipation: OFF tums, Ca, & Fe. Continue colace & miralax.      Electronically Signed By: Trav Rodriguez MD   11/24/24  3:33 PM

## 2024-11-11 NOTE — PROGRESS NOTES
*Provider Impression*    Patient is a 89 year old female who is seen today for management of multiple medical problems       #UTI - cranberry 600mg daily, d/c macrobid, add ceftriaxone 1gram IM daily x5d,  #CKD / Edema - f/u w/ Dr. Bernard, torsemide 20mg daily, NaCl 1gram BID, renal support drink daily, fluid restriction 1800mL daily; add sodium bicarb 650mg BID  #Constipation / Nausea / Dysphagia - consult SLP, zofran 4mg q6h PRN, miralax 17grams BID, senna 8.6mg BID  #Seborrheic dermatitis - ketoconazole shampoo  #Fracture of L5 vertebra & L sacral ala / OA - acetaminophen 500mg q4h PRN, lidocaine patch daily, tramadol 50mg BID, turmeric 1000mg BID, BlueEmu BID and BID PRN, f/u w/ Jaclyn Hicks  #Anemia / Vitamin deficiency - vitamin E 400units daily, zinc 50mg daily, calcium 250mg daily, vitamin B12 1mg weekly, biotin 1mg daily, ferrous sulfate 325mg BID  #HTN / HLD - fish oil 1000mg daily, amlodipine 5mg daily, pravastatin 20mg daily, ASA 81mg daily, carvedilol 12.5mg BID  #Allergic rhinitis - zyrtec 10mg daily, saline gel BID  #Insomnia - melatonin 3mg QHS PRN  #ACP - DNRCC-A  Follow up as needed      *Chief Complaint*     UTI    *History of Present Illness*    Patient is a 89 y/o female LTC resident of [facility] w/ PM as below who is seen today for f/u and management of multiple medical problems.     She had requested a visit d/t general malaise. Labs and UA sent - as below, notable for UTI and had been started on macrobid per sensitivities.    She is seen sitting up in her room today and reports she had emesis this am, she thinks it's related to the macrobid, some dysuria, no hematuria, some abd pain, no diarrhea, no blood in stool, no f/c, sweats, CP, SOB, cough or any other new c/o presently.     Alleriges - Azithromycin, Codeine, Penicillin, Cipro, Sulfa Antibiotics, Corn   PMH - hypertension, hyperlipidemia, anemia, CKD, DVT, anemia, OA, PVD, CAD,   PSH -  hip surgery, appendectomy,  marsupialization of ovarian cyst, HOMERO  FH - Brother had A-fib  SocHx - Never smoker, No EtOH    *Review of Systems*  All other systems reviewed are negative except as noted in the HPI     *Vital Signs*   Date: 11/3/24  - T: 96.7  P: 80  R: 14  BP: 130/80  SpO2: 96% on RA ; Date: 11/4/24 - Wt: 125      *Results / Data*  CBC - Date: 11/1/24  WBC: 9.11  HGB: 8.7  HCT: 29.6  PLT: 330  ;   BMP - Date: 10/31/24  Na: 142  K: 4.2  Cl: 104  CO2: 24  BUN: 71  Cr: 2.81  Glu: 88  Ca: 8.7  ;   LFT - Date: 10/23/24  AST: 30  ALT: 15  ALP: 75  Tbili: 0.2  ;   Coags - Date:   INR:   PT:  Other - Date: 9/14/23  Iron: 34  TIBC: 186  B12: >2000  Folate: >20.0  Ferritin: >2000  Retic: 2.5%; 10/2/23  Iron: 25  TIBC: 190  Ferritin: 1455  B12: >2000; 11/8/23  Ferritin: 1165  Iron: 31 ; 8/14/24  BNP: 335; 10/23/24  BNP: 560    *Physical Exam*  Gen: (+) NAD, (+) well-appearing  HEENT: (+) normocephalic, (+) MMM  Neck: (+) supple  Lungs: (+) CTAB, (-) wheezes, (-) rales, (-) rhonchi  Heart: (+) RRR, (+) S1 S2, (+) 1/6  Pulses: (+) palpable  Abd: (+) soft, (+) NT, (+) ND, (+) BS+  Ext: (-) edema, (-) deformity  MSK: (-) joint swelling  Skin: (+) warm, (+) dry, (-) rash  Neuro: (+) follows commands, (-) tremor, (+) alert

## 2024-11-18 PROBLEM — N39.0 URINARY TRACT INFECTION WITH HEMATURIA: Status: ACTIVE | Noted: 2024-11-18

## 2024-11-18 PROBLEM — R31.9 URINARY TRACT INFECTION WITH HEMATURIA: Status: ACTIVE | Noted: 2024-11-18

## 2024-11-18 NOTE — PROGRESS NOTES
Resident seen 24 -- MP    CC: LTC ([facility]) Recheck    : 1934  SNF H&P done 23, 23 (EPIC)  Allergy: Azithro, Codeine, PCN, Cipro, Sulfa, Corn  DNR-CCA, NO HD    S: 89 yo woman with HLD, CKD4, HTN, OA anemia and hyponatremia. Med List & Problem list reviewed. On abx for UTI. No cp/sob.    O: VSS AFEB 125# (down 9#) A&O NAD. Chest cta. heart rrr,  BRIDGET. Ext left LE Edema 2+ > Right 1+.    LAB (24) Hgb 9.4->8.7,  (10/31/24) Cr 2.81, GFR 15, K 4.2, Na 142, TSH 3.93, Alb 4.1, CO2 17->24  (10/23/24) Alb 3.4, ->6152->560  (2024) , HDL 52, LDL 71  (23) Ferritin 1165, Iron 25->31, TIBC 190  (23) Urine Electrophoresis -- No M Protein. Urine osmolality 513.    CXR (6/10/24) Clear (no infiltrate, no CHF)    24 PROCEDURE: verbal consent obtained. B/L knee lateral joint aspiration & injection of 3 cc 2% lidocaine+ kenalog 60. Pt tolerated well.    A/P:  # UTI: improving on Abx.  # Muscle weakness and gait instability: Largely Wheelchair bound. Walks with walker at times. Ok to order light weight/smaller walker Rx provided.  # Cough d/t PND: ayr spray. refuses flonase.  # Left>Right knee OA: improved with topical blue emu and CSI 24 -- next due no sooner than 24. Agrees to try lidocaine patch 1/2 each knee on 12h off 12h.  # Hx Hyponatremia: resolved OFF salt tablet. Monitor levels. Continue FR to 1800 cc 24.  # CHF/Edema: stable on torsemide 20 mg galvez.  # Lumbar spondylosis and hx L5 & Sacral Ala fx: pain control inadequate with tylenol. Pt refuses oxycodone and tramadol. Completed SNF Rehab. LTC. F/U with ortho. Lidoderm patch.  # Depression and Chronic pain: patient declines offer of cymbalta 30 mg qam.  # B/L heel pain c/w PF -- night splints.  # HTN: stable -- cut norvasc to 5, Coreg 12.5 bid.  # HLD: statin, ASA  # Anemia: persistent despite B12 replenishment. Iron stores replete. OFF ferrous sulfate 23 to help with constipation. May benefit from  EPO if does not climb above 10.  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 F/U Nephro Rosplock. NO HD per patients expressed advance directives. Stable on NaHCO3 for metabolic acidosis.  # Insomnia: Patient declines Ambien, melatonin and tramadol. Offer herbal tea, capsaicin cream.  # Hx Constipation: OFF tums, Ca, & Fe. Continue colace & miralax.

## 2024-11-21 DIAGNOSIS — N18.5 CKD (CHRONIC KIDNEY DISEASE) STAGE 5, GFR LESS THAN 15 ML/MIN (MULTI): Primary | ICD-10-CM

## 2024-11-21 DIAGNOSIS — D64.9 ANEMIA, UNSPECIFIED TYPE: ICD-10-CM

## 2024-11-21 RX ORDER — EPINEPHRINE 0.3 MG/.3ML
0.3 INJECTION SUBCUTANEOUS EVERY 5 MIN PRN
OUTPATIENT
Start: 2024-11-21

## 2024-11-21 RX ORDER — ALBUTEROL SULFATE 0.83 MG/ML
3 SOLUTION RESPIRATORY (INHALATION) AS NEEDED
OUTPATIENT
Start: 2024-11-21

## 2024-11-21 RX ORDER — DIPHENHYDRAMINE HYDROCHLORIDE 50 MG/ML
50 INJECTION INTRAMUSCULAR; INTRAVENOUS AS NEEDED
OUTPATIENT
Start: 2024-11-21

## 2024-11-21 RX ORDER — FAMOTIDINE 10 MG/ML
20 INJECTION INTRAVENOUS ONCE AS NEEDED
OUTPATIENT
Start: 2024-11-21

## 2024-11-22 ENCOUNTER — DOCUMENTATION (OUTPATIENT)
Dept: INFUSION THERAPY | Facility: CLINIC | Age: 89
End: 2024-11-22
Payer: COMMERCIAL

## 2024-11-22 NOTE — PROGRESS NOTES
Patient to be scheduled for venofer infusions.     For Diagnosis: Iron Deficiency Anemia    Urine Hcg test ordered prior to first infusion?  Not applicable (If female pt <60 years of age and with reproductive capability)  (If urine Hcg test ordered please instruct pt upon scheduling to drink 32 ounces of water 1 hour before arrival so bladder is full for needed urine sample)    Review of Labs:  Lab Results   Component Value Date    HGB 8.7 (L) 09/21/2023    FERRITIN 1,113 (H) 08/21/2023    IRON 36 08/21/2023    TIBC 210 (L) 08/21/2023    RETICHGB 33 08/21/2023    RETIC 0.056 08/21/2023    MCHC 31.0 (L) 09/21/2023    MCV 91 09/21/2023      Lab Results   Component Value Date    IRONSAT 17 (L) 08/21/2023        Do labs confirm diagnosis of iron deficiency? Yes - Labs scanned under Media section   (If NO please reach out to prescribing provider prior to proceeding)      Okay to schedule for treatment as ordered by prescribing provider.

## 2024-11-29 ENCOUNTER — INFUSION (OUTPATIENT)
Dept: INFUSION THERAPY | Facility: CLINIC | Age: 89
End: 2024-11-29
Payer: COMMERCIAL

## 2024-11-29 VITALS
DIASTOLIC BLOOD PRESSURE: 78 MMHG | TEMPERATURE: 98 F | WEIGHT: 125 LBS | SYSTOLIC BLOOD PRESSURE: 177 MMHG | RESPIRATION RATE: 18 BRPM | HEART RATE: 76 BPM | BODY MASS INDEX: 25.25 KG/M2 | OXYGEN SATURATION: 96 %

## 2024-11-29 DIAGNOSIS — D64.9 ANEMIA, UNSPECIFIED TYPE: ICD-10-CM

## 2024-11-29 DIAGNOSIS — N18.5 CKD (CHRONIC KIDNEY DISEASE) STAGE 5, GFR LESS THAN 15 ML/MIN (MULTI): ICD-10-CM

## 2024-11-29 RX ORDER — DIPHENHYDRAMINE HYDROCHLORIDE 50 MG/ML
50 INJECTION INTRAMUSCULAR; INTRAVENOUS AS NEEDED
OUTPATIENT
Start: 2024-12-06

## 2024-11-29 RX ORDER — DOCUSATE SODIUM 100 MG/1
100 CAPSULE, LIQUID FILLED ORAL 2 TIMES DAILY
COMMUNITY

## 2024-11-29 RX ORDER — EPINEPHRINE 0.3 MG/.3ML
0.3 INJECTION SUBCUTANEOUS EVERY 5 MIN PRN
OUTPATIENT
Start: 2024-12-06

## 2024-11-29 RX ORDER — SENNOSIDES 8.6 MG/1
1 TABLET ORAL 2 TIMES DAILY
COMMUNITY

## 2024-11-29 RX ORDER — CETIRIZINE HYDROCHLORIDE 10 MG/1
10 TABLET, CHEWABLE ORAL DAILY
COMMUNITY

## 2024-11-29 RX ORDER — FAMOTIDINE 10 MG/ML
20 INJECTION INTRAVENOUS ONCE AS NEEDED
OUTPATIENT
Start: 2024-12-06

## 2024-11-29 RX ORDER — ALBUTEROL SULFATE 0.83 MG/ML
3 SOLUTION RESPIRATORY (INHALATION) AS NEEDED
OUTPATIENT
Start: 2024-12-06

## 2024-11-29 RX ORDER — TORSEMIDE 20 MG/1
20 TABLET ORAL DAILY
COMMUNITY

## 2024-11-29 RX ORDER — ASPIRIN 81 MG/1
81 TABLET ORAL DAILY
COMMUNITY

## 2024-11-29 RX ORDER — OMEGA-3-ACID ETHYL ESTERS 1 G/1
1 CAPSULE, LIQUID FILLED ORAL DAILY
COMMUNITY

## 2024-11-29 RX ORDER — SODIUM BICARBONATE 650 MG/1
650 TABLET ORAL 4 TIMES DAILY
COMMUNITY

## 2024-11-29 RX ORDER — GABAPENTIN 100 MG/1
100 CAPSULE ORAL NIGHTLY
COMMUNITY

## 2024-11-29 ASSESSMENT — ENCOUNTER SYMPTOMS
NUMBNESS: 0
SHORTNESS OF BREATH: 0
UNEXPECTED WEIGHT CHANGE: 0
WHEEZING: 0
VOICE CHANGE: 0
FREQUENCY: 0
CONSTIPATION: 1
BLOOD IN STOOL: 0
APPETITE CHANGE: 0
EYE PROBLEMS: 0
DYSURIA: 0
WOUND: 0
MYALGIAS: 0
NAUSEA: 0
ABDOMINAL PAIN: 0
LIGHT-HEADEDNESS: 0
FEVER: 0
CHILLS: 0
SORE THROAT: 0
ARTHRALGIAS: 0
VOMITING: 0
LEG SWELLING: 1
HEMATURIA: 0
DIZZINESS: 0
HEADACHES: 0
TROUBLE SWALLOWING: 0
EXTREMITY WEAKNESS: 0
PALPITATIONS: 0
DIARRHEA: 0
FATIGUE: 1
COUGH: 1

## 2024-11-29 ASSESSMENT — PAIN SCALES - GENERAL: PAINLEVEL_OUTOF10: 0-NO PAIN

## 2024-11-29 NOTE — PROGRESS NOTES
[facility]   Infusion Clinic Note   Date: 2024   Name: Camelia Jones  : 1934   MRN: [id number]          Reason for Visit: OP Infusion (Venofer 200 mg )         Today: We administered iron sucrose (Venofer) 200 mg in sodium chloride 0.9% 110 mL IV.       Visit Type: INFUSION       Ordered By: Dr Bernard       Accompanied by:Child/Children       Diagnosis: CKD (chronic kidney disease) stage 5, GFR less than 15 ml/min (Multi)    Anemia, unspecified type        Allergies:   Allergies as of 2024 - Reviewed 2024   Allergen Reaction Noted    Azithromycin Nausea Only 2023    Ciprofloxacin Nausea Only 2023    Codeine Nausea Only 2023    Macrolide antibiotics Nausea Only 2023    Penicillins Unknown 2023    Sulfa (sulfonamide antibiotics) Nausea Only 2023          Current Medications has a current medication list which includes the following prescription(s): amlodipine, aspirin, biotin, calcium carbonate, carvedilol, cetirizine, cyanocobalamin, dextromethorphan-guaifenesin, docusate sodium, ferrous sulfate (325 mg ferrous sulfate), b complex-vitamin c-folic acid, gabapentin, krill oil, magnesium hydroxide, omega-3 acid ethyl esters, polyethylene glycol, pravastatin, sennosides, sodium bicarbonate, torsemide, and zinc gluconate.       Vitals:   Vitals:    24 1307 24 1406 24 1435   BP: 179/77 179/81 177/78   Pulse: 73 74 76   Resp: 18 18 18   Temp: 36.7 °C (98 °F)     SpO2: 96%     Weight: 56.7 kg (125 lb)     PainSc: 0-No pain               Infusion Pre-procedure Checklist:   - Allergies reviewed: yes   - Medications reviewed: yes       - Previous reaction to current treatment: no      Assess patient for the concerns below. Document provider notification as appropriate.  - Active or recent infection with/without current antibiotic use: no  - Recent or planned invasive dental work: no  - Recent or planned  surgeries: no  - Recently received or plans to receive vaccinations: no  - Has treatment related toxicities: no  - Is pregnant:  n/a      Pain: 0   - Is the pain different from normal: no   - Is your Doctor aware:  n/a       Labs: N/A          Fall Risk Screening: Antonia Fall Risk  History of Falling, Immediate or Within 3 Months: No  Secondary Diagnosis: No  Ambulatory Aid: Crutches/cane/walker  Gait/Transferring: Weak  Mental Status: Oriented to own ability       Review Of Systems:  Review of Systems   Constitutional:  Positive for fatigue. Negative for appetite change (poor apetite), chills, fever and unexpected weight change.   HENT:   Negative for hearing loss, mouth sores, sore throat, tinnitus, trouble swallowing and voice change.    Eyes:  Negative for eye problems.   Respiratory:  Positive for cough. Negative for shortness of breath and wheezing.    Cardiovascular:  Positive for leg swelling (Bilat lower extremitiy edema, Left greater than right). Negative for chest pain and palpitations.   Gastrointestinal:  Positive for constipation. Negative for abdominal pain, blood in stool, diarrhea, nausea and vomiting.   Genitourinary:  Negative for dysuria, frequency and hematuria.    Musculoskeletal:  Negative for arthralgias and myalgias.   Skin:  Negative for itching, rash and wound.   Neurological:  Negative for dizziness, extremity weakness, headaches, light-headedness and numbness.         ROS completed? Yes      Infusion Readiness:  - Assessment Concerns Related to Infusion: No  - Provider notified: n/a      Document Below Only If Indicated:   New Patient Education:    NEW PATIENT MEDICATION EDUCATION PT PROVIDED WITH WRITTEN ([facility] PT EDUCATION SHEET) AND VERBAL EDUCATION REGARDING MEDICATION GIVEN. VERIFIED MEDICATION NAME WITH PATIENT AND DISCUSSED REASON FOR USE. BRIEFLY DISCUSSED HOW MEDICATION WORKS AND EDUCATED ON GOAL OF TREATMENT, FREQUENCY OF TREATMENT, ADVERSE RXN'S AND COMMON SIDE EFFECTS TO  MONITOR FOR. INSTRUCTED PT TO ASSURE THAT ALL PROVIDERS INCLUDING DENTISTS ARE AWARE OF MEDICATION RECEIVED. DISCUSSED FLOW OF VISIT AND ORIENTED TO INFUSION CENTER. PT VERBALIZES UNDERSTANDING. CALL LIGHT PROVIDED AND PT AWARE TO ALERT STAFF OF ANY CONCERNS DURING TREATMENT.        Treatment Conditions & Drug Specific Questions:    Iron Sucrose (VENOFER)     (Unless otherwise specified on patient specific therapy plan):    REMINDERS:  May cause transient hypotension. Supine position recommended for infusion.     Recommended Vitals/Observation:  Vitals: Obtain vital signs before and after each infusion.  Observation: 30 minutes after the infusion is complete.    Lab Results   Component Value Date    IRON 36 08/21/2023    TIBC 210 (L) 08/21/2023    FERRITIN 1,113 (H) 08/21/2023     Lab Results   Component Value Date    IRONSAT 17 (L) 08/21/2023      Lab Results   Component Value Date    WBC 7.9 09/21/2023    HGB 8.7 (L) 09/21/2023    HCT 28.1 (L) 09/21/2023    MCV 91 09/21/2023     09/21/2023            Weight Based Drug Calculations:    WEIGHT BASED DRUGS: NOT APPLICABLE / FLAT DOSE          Initiated By: Mairbell Jones, APRN-CNP   1435 30 minutes post infusion vital signs stable. Patient tolerated well, no signs or symptoms of reaction.

## 2024-11-29 NOTE — PATIENT INSTRUCTIONS
Today :We administered iron sucrose (Venofer) 200 mg in sodium chloride 0.9% 110 mL IV.     For:   1. CKD (chronic kidney disease) stage 5, GFR less than 15 ml/min (Multi)    2. Anemia, unspecified type         Your next appointment is due in:  12/6/2024        Please read the  Medication Guide that was given to you and reviewed during todays visit.     (Tell all doctors including dentists that you are taking this medication)     Go to the emergency room or call 911 if:  -You have signs of allergic reaction:   -Rash, hives, itching.   -Swollen, blistered, peeling skin.   -Swelling of face, lips, mouth, tongue or throat.   -Tightness of chest, trouble breathing, swallowing or talking     Call your doctor:  - If IV / injection site gets red, warm, swollen, itchy or leaks fluid or pus.     (Leave dressing on your IV site for at least 2 hours and keep area clean and dry  - If you get sick or have symptoms of infection or are not feeling well for any reason.    (Wash your hands often, stay away from people who are sick)  - If you have side effects from your medication that do not go away or are bothersome.     (Refer to the teaching your nurse gave you for side effects to call your doctor about)    - Common side effects may include:  stuffy nose, headache, feeling tired, muscle aches, upset stomach  - Before receiving any vaccines     - Call [facility] at   If:  - You get sick, are on antibiotics, have had a recent vaccine, have surgery or dental work and your doctor wants your visit rescheduled.  - You need to cancel and reschedule your visit for any reason. Call at least 2 days before your visit if you need to cancel.   - Your insurance changes before your next visit.    (We will need to get approval from your new insurance. This can take up to two weeks.)     [facility] is opened Monday thru Friday. We are closed on weekends and holidays.   Voice mail will take your call if  the center is closed. If you leave a message please allow 24 hours for a call back during weekdays. If you leave a message on a weekend/holiday, we will call you back the next business day.    A pharmacist is available Monday - Friday from 8:30AM to 3:30PM to help answer any questions you may have about your prescriptions(s). Please call pharmacy at:    [facility]: [phone]  [facility]: [phone]  [facility]: [phone]

## 2024-12-04 ENCOUNTER — NURSING HOME VISIT (OUTPATIENT)
Dept: POST ACUTE CARE | Facility: EXTERNAL LOCATION | Age: 89
End: 2024-12-04
Payer: COMMERCIAL

## 2024-12-04 DIAGNOSIS — I10 HYPERTENSION, UNSPECIFIED TYPE: ICD-10-CM

## 2024-12-04 DIAGNOSIS — R60.9 EDEMA, UNSPECIFIED TYPE: ICD-10-CM

## 2024-12-04 DIAGNOSIS — S32.2XXD CLOSED FRACTURE OF SACRUM AND COCCYX WITH ROUTINE HEALING, SUBSEQUENT ENCOUNTER: ICD-10-CM

## 2024-12-04 DIAGNOSIS — R11.0 NAUSEA: ICD-10-CM

## 2024-12-04 DIAGNOSIS — E56.9 VITAMIN DEFICIENCY: ICD-10-CM

## 2024-12-04 DIAGNOSIS — N18.5 CKD (CHRONIC KIDNEY DISEASE) STAGE 5, GFR LESS THAN 15 ML/MIN (MULTI): Primary | ICD-10-CM

## 2024-12-04 DIAGNOSIS — K59.00 CONSTIPATION, UNSPECIFIED CONSTIPATION TYPE: ICD-10-CM

## 2024-12-04 DIAGNOSIS — E78.5 DYSLIPIDEMIA: ICD-10-CM

## 2024-12-04 DIAGNOSIS — R13.10 DYSPHAGIA, UNSPECIFIED TYPE: ICD-10-CM

## 2024-12-04 DIAGNOSIS — D64.9 ANEMIA, UNSPECIFIED TYPE: ICD-10-CM

## 2024-12-04 DIAGNOSIS — M17.12 PRIMARY OSTEOARTHRITIS OF LEFT KNEE: ICD-10-CM

## 2024-12-04 DIAGNOSIS — Z29.89 NEED FOR PROPHYLAXIS AGAINST URINARY TRACT INFECTION: ICD-10-CM

## 2024-12-04 DIAGNOSIS — S32.10XD CLOSED FRACTURE OF SACRUM AND COCCYX WITH ROUTINE HEALING, SUBSEQUENT ENCOUNTER: ICD-10-CM

## 2024-12-04 DIAGNOSIS — S32.059D CLOSED FRACTURE OF FIFTH LUMBAR VERTEBRA WITH ROUTINE HEALING, UNSPECIFIED FRACTURE MORPHOLOGY, SUBSEQUENT ENCOUNTER: ICD-10-CM

## 2024-12-04 DIAGNOSIS — L21.9 SEBORRHEIC DERMATITIS: ICD-10-CM

## 2024-12-04 PROCEDURE — 99309 SBSQ NF CARE MODERATE MDM 30: CPT | Performed by: NURSE PRACTITIONER

## 2024-12-05 ENCOUNTER — NURSING HOME VISIT (OUTPATIENT)
Dept: POST ACUTE CARE | Facility: EXTERNAL LOCATION | Age: 89
End: 2024-12-05
Payer: COMMERCIAL

## 2024-12-05 DIAGNOSIS — N18.4 CKD (CHRONIC KIDNEY DISEASE) STAGE 4, GFR 15-29 ML/MIN (MULTI): ICD-10-CM

## 2024-12-05 DIAGNOSIS — D64.9 ANEMIA, UNSPECIFIED TYPE: ICD-10-CM

## 2024-12-05 PROCEDURE — 99309 SBSQ NF CARE MODERATE MDM 30: CPT | Performed by: FAMILY MEDICINE

## 2024-12-05 NOTE — LETTER
Patient: Camelia Jones  : 1934    Encounter Date: 2024    Resident seen 24 -- MP    CC: LTC ([facility]) Recheck    : 1934  SNF H&P done 23, 23 (EPIC)  Allergy: Azithro, Codeine, PCN, Cipro, Sulfa, Corn  DNR-CCA, NO HD    S: 89 yo woman with HLD, CKD4, HTN, OA anemia and hyponatremia. Increased leg swelling and pain in left leg. Med List & Problem list reviewed. On abx for UTI. No cp/sob.    O: VSS AFEB 126# (up 1#) A&O NAD. Chest cta. heart rrr, 2/ BRIDGET. Ext left LE Edema 3+ > Right 1+.    LAB (25) PENDING Iron, Ferritin, CBC, and CMP  (24) Ferritin 844, Iron 39, TIBC 210  (24) Hgb 9.4->8.7,  (10/31/24) Cr 2.81, GFR 15, K 4.2, Na 142, TSH 3.93, Alb 4.1, CO2 17->24  (10/23/24) Alb 3.4, ->6152->560  (2024) , HDL 52, LDL 71  (23) Ferritin 1165, Iron 25->31, TIBC 190  (23) Urine Electrophoresis -- No M Protein. Urine osmolality 513.    CXR (6/10/24) Clear (no infiltrate, no CHF)    24 PROCEDURE: verbal consent obtained. B/L knee lateral joint aspiration & injection of 3 cc 2% lidocaine+ kenalog 60. Pt tolerated well.    A/P:  # Left leg pain and swelling -- await LE Doppler US. If no DVT can consider continued compression stockings and knee brace.  # Muscle weakness and gait instability: Largely Wheelchair bound. Walks with walker at times. Ok to order light weight/smaller walker Rx previously provided.  # Cough d/t PND: ayr spray. refuses flonase.  # Left>Right knee OA: improved with topical blue emu and CSI 24 -- next due no sooner than 24. Agrees to try lidocaine patch 1/2 each knee on 12h off 12h.  # Hx Hyponatremia: resolved OFF salt tablet. Monitor levels. Continue FR to 1800 cc 24.  # CHF/Edema: stable on torsemide 20 mg galvez.  # Lumbar spondylosis and hx L5 & Sacral Ala fx: pain control inadequate with tylenol. Pt refuses oxycodone and tramadol. Completed SNF Rehab. LTC. F/U with ortho. Lidoderm patch.  # Depression and  Chronic pain: patient declines offer of cymbalta 30 mg qam.  # B/L heel pain c/w PF -- night splints.  # HTN: stable -- cut norvasc to 5, Coreg 12.5 bid.  # HLD: statin, ASA  # Anemia: persistent despite B12 replenishment. Iron stores replete. OFF ferrous sulfate 11/2/23 to help with constipation. May benefit from EPO if does not climb above 10.  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 F/U Nephro Rosplock (seen 11/19/24). Started on Iron Infusions and consider epogen. NO HD per patients expressed advance directives. Stable on NaHCO3 for metabolic acidosis.  # Insomnia: Patient declines Ambien, melatonin and tramadol. Offer herbal tea, capsaicin cream.  # Hx Constipation: OFF tums, Ca, & Fe. Continue colace & miralax.      Electronically Signed By: Trav Rodriguez MD   12/6/24  2:46 PM

## 2024-12-06 ENCOUNTER — APPOINTMENT (OUTPATIENT)
Dept: INFUSION THERAPY | Facility: CLINIC | Age: 89
End: 2024-12-06
Payer: COMMERCIAL

## 2024-12-06 VITALS
SYSTOLIC BLOOD PRESSURE: 175 MMHG | TEMPERATURE: 98 F | OXYGEN SATURATION: 96 % | RESPIRATION RATE: 18 BRPM | DIASTOLIC BLOOD PRESSURE: 74 MMHG | HEART RATE: 75 BPM

## 2024-12-06 DIAGNOSIS — N18.5 CKD (CHRONIC KIDNEY DISEASE) STAGE 5, GFR LESS THAN 15 ML/MIN (MULTI): ICD-10-CM

## 2024-12-06 DIAGNOSIS — D64.9 ANEMIA, UNSPECIFIED TYPE: ICD-10-CM

## 2024-12-06 LAB — POC HEMOGLOBIN: 9.2 G/DL (ref 12–16)

## 2024-12-06 RX ORDER — FAMOTIDINE 10 MG/ML
20 INJECTION INTRAVENOUS ONCE AS NEEDED
OUTPATIENT
Start: 2024-12-13

## 2024-12-06 RX ORDER — ALBUTEROL SULFATE 0.83 MG/ML
3 SOLUTION RESPIRATORY (INHALATION) AS NEEDED
OUTPATIENT
Start: 2024-12-13

## 2024-12-06 RX ORDER — EPINEPHRINE 0.3 MG/.3ML
0.3 INJECTION SUBCUTANEOUS EVERY 5 MIN PRN
OUTPATIENT
Start: 2024-12-13

## 2024-12-06 RX ORDER — DIPHENHYDRAMINE HYDROCHLORIDE 50 MG/ML
50 INJECTION INTRAMUSCULAR; INTRAVENOUS AS NEEDED
OUTPATIENT
Start: 2024-12-13

## 2024-12-06 ASSESSMENT — ENCOUNTER SYMPTOMS
PALPITATIONS: 0
TROUBLE SWALLOWING: 0
HEMATURIA: 0
DYSURIA: 0
LIGHT-HEADEDNESS: 0
SORE THROAT: 0
FREQUENCY: 0
WHEEZING: 0
COUGH: 0
VOMITING: 0
BLOOD IN STOOL: 0
FATIGUE: 1
APPETITE CHANGE: 0
DIARRHEA: 0
MYALGIAS: 0
DIZZINESS: 0
WOUND: 0
NAUSEA: 0
UNEXPECTED WEIGHT CHANGE: 0
FEVER: 0
LEG SWELLING: 1
CONSTIPATION: 0
HEADACHES: 0
NUMBNESS: 0
EYE PROBLEMS: 0
CHILLS: 0
VOICE CHANGE: 0
SHORTNESS OF BREATH: 0
ABDOMINAL PAIN: 0
ARTHRALGIAS: 0

## 2024-12-06 ASSESSMENT — PAIN SCALES - GENERAL: PAINLEVEL_OUTOF10: 0-NO PAIN

## 2024-12-06 NOTE — PROGRESS NOTES
[facility]   Infusion Clinic Note   Date: 2024   Name: Camelia Jones  : 1934   MRN: [id number]          Reason for Visit: OP Infusion (Venofer/Retacrit)         Today: We administered iron sucrose (Venofer) 200 mg in sodium chloride 0.9% 110 mL IV. Retacrit not given due to patient BP being consistently over 160      Visit Type: INFUSION       Ordered By: Dr. Bernard       Accompanied by:Child/Children       Diagnosis: Anemia, unspecified type    CKD (chronic kidney disease) stage 5, GFR less than 15 ml/min (Multi)        Allergies:   Allergies as of 2024 - Reviewed 2024   Allergen Reaction Noted    Azithromycin Nausea Only 2023    Ciprofloxacin Nausea Only 2023    Codeine Nausea Only 2023    Macrolide antibiotics Nausea Only 2023    Penicillins Unknown 2023    Sulfa (sulfonamide antibiotics) Nausea Only 2023          Current Medications has a current medication list which includes the following prescription(s): amlodipine, aspirin, biotin, calcium carbonate, carvedilol, cetirizine, cyanocobalamin, dextromethorphan-guaifenesin, docusate sodium, ferrous sulfate (325 mg ferrous sulfate), b complex-vitamin c-folic acid, gabapentin, krill oil, magnesium hydroxide, omega-3 acid ethyl esters, polyethylene glycol, pravastatin, sennosides, sodium bicarbonate, torsemide, and zinc gluconate, and the following Facility-Administered Medications: epoetin willian-epbx.       Vitals:   Vitals:    24 1054 24 1200 24 1232   BP: 138/67 160/71 175/74   Pulse: 71 75 75   Resp: 18 18 18   Temp: 36.5 °C (97.7 °F) 36.6 °C (97.9 °F) 36.7 °C (98 °F)   TempSrc: Temporal     SpO2: 96%     PainSc: 0-No pain               Infusion Pre-procedure Checklist:   - Allergies reviewed: yes   - Medications reviewed: yes       - Previous reaction to current treatment: no      Assess patient for the concerns below. Document provider notification  as appropriate.  - Active or recent infection with/without current antibiotic use: no  - Recent or planned invasive dental work: no  - Recent or planned surgeries: no  - Recently received or plans to receive vaccinations: no  - Has treatment related toxicities: no  - Is pregnant:  n/a      Pain: 0   - Is the pain different from normal: no   - Is your Doctor aware:  n/a       Labs:  POCT Hgb 9.2 today          Fall Risk Screening: Knight Fall Risk  History of Falling, Immediate or Within 3 Months: No  Secondary Diagnosis: Yes  Ambulatory Aid: Crutches/cane/walker  Intravenous Therapy/Heparin Lock: Yes  Gait/Transferring: Weak  Mental Status: Oriented to own ability  Knight Fall Risk Score: 60       Review Of Systems:  Review of Systems   Constitutional:  Positive for fatigue. Negative for appetite change, chills, fever and unexpected weight change.   HENT:   Negative for hearing loss, mouth sores, sore throat, tinnitus, trouble swallowing and voice change.    Eyes:  Negative for eye problems.   Respiratory:  Negative for cough, shortness of breath and wheezing.    Cardiovascular:  Positive for leg swelling. Negative for chest pain and palpitations.        Left leg swelling greater than right leg (ultrasound done 12/5/24)   Gastrointestinal:  Negative for abdominal pain, blood in stool, constipation, diarrhea, nausea and vomiting.   Genitourinary:  Negative for dysuria, frequency and hematuria.    Musculoskeletal:  Negative for arthralgias and myalgias.        Left knee weakness   Skin:  Negative for itching, rash and wound.   Neurological:  Negative for dizziness, headaches, light-headedness and numbness.   All other systems reviewed and are negative.        ROS completed? Yes      Infusion Readiness:  - Assessment Concerns Related to Infusion: No  - Provider notified: n/a      Document Below Only If Indicated:   New Patient Education:    N/A (returning patient for continuation of therapy. Ongoing education provided as  "needed.)        Treatment Conditions & Drug Specific Questions:        (Unless otherwise specified on patient specific therapy plan):     TREATMENT CONDITIONS:  Unless otherwise specified on patient specific therapy plan HOLD and notify provider prior to proceeding with today's injection if:  o Hemoglobin GREATER THAN 11 g/dL (parameter set by prescribing provider)  o Increase in hemoglobin GREATER THAN 1 g/dL   o SBP GREATER THAN 160 mmHg    Lab Results   Component Value Date/Time    HGB 9.2 (A) 12/06/2024 1100    HGB 8.7 (L) 09/21/2023 0536    HGB 8.2 (L) 09/20/2023 0524    HGB 8.3 (L) 09/19/2023 0513     No results found for: \"HGBCAP\"        Labs reviewed and patient meets treatment conditions? Yes    DRUG SPECIFIC QUESTIONS:  - Does the patient have uncontrolled hypertension?  Yes    (Use is contraindicated in patients with uncontrolled hypertension)     - Educate on the following:? Yes       - Increased risk of serious cardiovascular reactions, thromboembolic reactions, stroke and tumor progression.        - Educate patient to report any edema, sudden weight gain, seizures, lack of energy, pain or swelling in the limbs.             - Need to undergo regular blood pressure monitoring. Adhere to prescribed anti-hypertensive regimen and follow recommended dietary restrictions.        - Need to contact their healthcare provider for new onset neurologic symptoms or change in seizure frequency.        - Need for  regular laboratory tests to monitor hemoglobin levels.        REMINDERS:  Recommended Vitals/Observation:  Take blood pressure prior to preparing the drug for administration.   Observation: Patient may leave immediately following the injection.     Iron Sucrose (VENOFER)     (Unless otherwise specified on patient specific therapy plan):    REMINDERS:  May cause transient hypotension. Supine position recommended for infusion.     Recommended Vitals/Observation:  Vitals: Obtain vital signs before and after " each infusion.  Observation: 30 minutes after the infusion is complete.    Lab Results   Component Value Date    IRON 36 08/21/2023    TIBC 210 (L) 08/21/2023    FERRITIN 1,113 (H) 08/21/2023     Lab Results   Component Value Date    IRONSAT 17 (L) 08/21/2023      Lab Results   Component Value Date    WBC 7.9 09/21/2023    HGB 9.2 (A) 12/06/2024    HCT 28.1 (L) 09/21/2023    MCV 91 09/21/2023     09/21/2023            Weight Based Drug Calculations:    WEIGHT BASED DRUGS: NOT APPLICABLE / FLAT DOSE          Initiated By: Luiz Callaway, RN     1200 Line flushed with NS 50 ml when infusion bag completed. Patient tolerating well.   1235 Retacrit held today due to /74. Will reassess at next week's appointment

## 2024-12-06 NOTE — PROGRESS NOTES
Resident seen 24 -- MP    CC: LTC ([facility]) Recheck    : 1934  SNF H&P done 23, 23 (EPIC)  Allergy: Azithro, Codeine, PCN, Cipro, Sulfa, Corn  DNR-CCA, NO HD    S: 89 yo woman with HLD, CKD4, HTN, OA anemia and hyponatremia. Increased leg swelling and pain in left leg. Med List & Problem list reviewed. On abx for UTI. No cp/sob.    O: VSS AFEB 126# (up 1#) A&O NAD. Chest cta. heart rrr, 2 BRIDGET. Ext left LE Edema 3+ > Right 1+.    LAB (25) PENDING Iron, Ferritin, CBC, and CMP  (24) Ferritin 844, Iron 39, TIBC 210  (24) Hgb 9.4->8.7,  (10/31/24) Cr 2.81, GFR 15, K 4.2, Na 142, TSH 3.93, Alb 4.1, CO2 17->24  (10/23/24) Alb 3.4, ->6152->560  (2024) , HDL 52, LDL 71  (23) Ferritin 1165, Iron 25->31, TIBC 190  (23) Urine Electrophoresis -- No M Protein. Urine osmolality 513.    CXR (6/10/24) Clear (no infiltrate, no CHF)    24 PROCEDURE: verbal consent obtained. B/L knee lateral joint aspiration & injection of 3 cc 2% lidocaine+ kenalog 60. Pt tolerated well.    A/P:  # Left leg pain and swelling -- await LE Doppler US. If no DVT can consider continued compression stockings and knee brace.  # Muscle weakness and gait instability: Largely Wheelchair bound. Walks with walker at times. Ok to order light weight/smaller walker Rx previously provided.  # Cough d/t PND: ayr spray. refuses flonase.  # Left>Right knee OA: improved with topical blue emu and CSI 24 -- next due no sooner than 24. Agrees to try lidocaine patch 1/2 each knee on 12h off 12h.  # Hx Hyponatremia: resolved OFF salt tablet. Monitor levels. Continue FR to 1800 cc 24.  # CHF/Edema: stable on torsemide 20 mg galvez.  # Lumbar spondylosis and hx L5 & Sacral Ala fx: pain control inadequate with tylenol. Pt refuses oxycodone and tramadol. Completed SNF Rehab. LTC. F/U with ortho. Lidoderm patch.  # Depression and Chronic pain: patient declines offer of cymbalta 30 mg qam.  # B/L heel  pain c/w PF -- night splints.  # HTN: stable -- cut norvasc to 5, Coreg 12.5 bid.  # HLD: statin, ASA  # Anemia: persistent despite B12 replenishment. Iron stores replete. OFF ferrous sulfate 11/2/23 to help with constipation. May benefit from EPO if does not climb above 10.  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 F/U Nephro Rosplock (seen 11/19/24). Started on Iron Infusions and consider epogen. NO HD per patients expressed advance directives. Stable on NaHCO3 for metabolic acidosis.  # Insomnia: Patient declines Ambien, melatonin and tramadol. Offer herbal tea, capsaicin cream.  # Hx Constipation: OFF tums, Ca, & Fe. Continue colace & miralax.

## 2024-12-06 NOTE — PATIENT INSTRUCTIONS
Today :We administered iron sucrose (Venofer) 200 mg in sodium chloride 0.9% 110 mL IV.     For:   1. Anemia, unspecified type    2. CKD (chronic kidney disease) stage 5, GFR less than 15 ml/min (Multi)         Your next appointment is due in:  one week        Please read the  Medication Guide that was given to you and reviewed during todays visit.     (Tell all doctors including dentists that you are taking this medication)     Go to the emergency room or call 911 if:  -You have signs of allergic reaction:   -Rash, hives, itching.   -Swollen, blistered, peeling skin.   -Swelling of face, lips, mouth, tongue or throat.   -Tightness of chest, trouble breathing, swallowing or talking     Call your doctor:  - If IV / injection site gets red, warm, swollen, itchy or leaks fluid or pus.     (Leave dressing on your IV site for at least 2 hours and keep area clean and dry  - If you get sick or have symptoms of infection or are not feeling well for any reason.    (Wash your hands often, stay away from people who are sick)  - If you have side effects from your medication that do not go away or are bothersome.     (Refer to the teaching your nurse gave you for side effects to call your doctor about)    - Common side effects may include:  stuffy nose, headache, feeling tired, muscle aches, upset stomach  - Before receiving any vaccines     - Call [facility] at   If:  - You get sick, are on antibiotics, have had a recent vaccine, have surgery or dental work and your doctor wants your visit rescheduled.  - You need to cancel and reschedule your visit for any reason. Call at least 2 days before your visit if you need to cancel.   - Your insurance changes before your next visit.    (We will need to get approval from your new insurance. This can take up to two weeks.)     [facility] is opened Monday thru Friday. We are closed on weekends and holidays.   Voice mail will take your call if the  center is closed. If you leave a message please allow 24 hours for a call back during weekdays. If you leave a message on a weekend/holiday, we will call you back the next business day.    A pharmacist is available Monday - Friday from 8:30AM to 3:30PM to help answer any questions you may have about your prescriptions(s). Please call pharmacy at:    [facility]: [phone]  [facility]: [phone]  [facility]: [phone]

## 2024-12-08 NOTE — PROGRESS NOTES
*Provider Impression*    Patient is a 89 year old female who is seen today for management of multiple medical problems       #CKD / Edema / HTN / HLD - fish oil 1000mg daily, pravastatin 20mg daily, ASA 81mg daily, carvedilol 12.5mg BID, f/u w/ Dr. Bernard, torsemide 20mg daily, NaCl 1gram BID, renal support drink daily, fluid restriction 1800mL daily, sodium bicarb 650mg BID, check venous doppler LLE, decrease amlodipine to 2.5mg daily (addendum: d/c amlodipine, weights MWF x2wk)  #UTI prophylaxis - cranberry 600mg daily  #Constipation / Nausea / Dysphagia - consult SLP, zofran 4mg q6h PRN, miralax 17grams BID, senna 8.6mg BID  #Seborrheic dermatitis - ketoconazole shampoo  #Fracture of L5 vertebra & L sacral ala / OA - acetaminophen 500mg q4h PRN, lidocaine patch daily, tramadol 50mg BID, turmeric 1000mg BID, BlueEmu BID and BID PRN, f/u w/ Jaclyn Hicks  #Anemia / Vitamin deficiency - vitamin E 400units daily, zinc 50mg daily, calcium 250mg daily, vitamin B12 1mg weekly, biotin 1mg daily, ferrous sulfate 325mg BID  #Allergic rhinitis - zyrtec 10mg daily, saline gel BID  #Insomnia - melatonin 3mg QHS PRN  #ACP - DNRCC-A  Follow up as needed      *Chief Complaint*     edema    *History of Present Illness*    Patient is a 89 y/o female LTC resident of [facility] w/ [facility] as below who is seen today for f/u and management of multiple medical problems.     She is requesting to see me today reporting worsening edema in LLE. Weights are stable and actually appear to be down about 10lb from most recent peak.     She denies any calf pain, leg weakness which she has been working on with therapy, no CP, SOB, cough, no f/c, sweats, n/v, some faitigue, constipation, diarrhea, or any other new c/o presently.     [place]eriges - Azithromycin, Codeine, Penicillin, Cipro, Sulfa Antibiotics, Corn   PMH - hypertension, hyperlipidemia, anemia, CKD, DVT, anemia, OA, PVD, CAD,   PSH -  hip surgery, appendectomy, marsupialization of  ovarian cyst, HOMERO  FH - Brother had A-fib  SocHx - Never smoker, No EtOH    *Review of Systems*  All other systems reviewed are negative except as noted in the HPI     *Vital Signs*   Date: 11/29/24  - T:   P: 76  R: 20  BP: 119/67  SpO2: % on RA ; Date: 12/2/24 - Wt: 126      *Results / Data*  CBC - Date: 11/1/24  WBC: 9.11  HGB: 8.7  HCT: 29.6  PLT: 330  ;   BMP - Date: 10/31/24  Na: 142  K: 4.2  Cl: 104  CO2: 24  BUN: 71  Cr: 2.81  Glu: 88  Ca: 8.7  ;   LFT - Date: 10/23/24  AST: 30  ALT: 15  ALP: 75  Tbili: 0.2  ;   Coags - Date:   INR:   PT:  Other - Date: 9/14/23  Iron: 34  TIBC: 186  B12: >2000  Folate: >20.0  Ferritin: >2000  Retic: 2.5%; 10/2/23  Iron: 25  TIBC: 190  Ferritin: 1455  B12: >2000; 11/8/23  Ferritin: 1165  Iron: 31 ; 8/14/24  BNP: 335; 10/23/24  BNP: 560    *Physical Exam*  Gen: (+) NAD, (+) well-appearing  HEENT: (+) normocephalic, (+) MMM  Neck: (+) supple  Lungs: (+) CTAB, (-) wheezes, (-) rales, (-) rhonchi  Heart: (+) RRR, (+) S1 S2, (+) 1/6 BRIDGET  Pulses: (+) palpable  Abd: (+) soft, (+) NT, (+) ND, (+) BS+  Ext: (-) edema, (-) deformity  MSK: (-) joint swelling  Skin: (+) warm, (+) dry, (-) rash  Neuro: (+) follows commands, (-) tremor, (+) alert

## 2024-12-12 ASSESSMENT — DERMATOLOGY QUALITY OF LIFE (QOL) ASSESSMENT
WHAT SINGLE SKIN CONDITION LISTED BELOW IS THE PATIENT ANSWERING THE QUALITY-OF-LIFE ASSESSMENT QUESTIONS ABOUT: NONE OF THE ABOVE
RATE HOW BOTHERED YOU ARE BY SYMPTOMS OF YOUR SKIN PROBLEM (EG, ITCHING, STINGING BURNING, HURTING OR SKIN IRRITATION): 0 - NEVER BOTHERED
WHAT SINGLE SKIN CONDITION LISTED BELOW IS THE PATIENT ANSWERING THE QUALITY-OF-LIFE ASSESSMENT QUESTIONS ABOUT: NONE OF THE ABOVE
RATE HOW BOTHERED YOU ARE BY EFFECTS OF YOUR SKIN PROBLEMS ON YOUR ACTIVITIES (EG, GOING OUT, ACCOMPLISHING WHAT YOU WANT, WORK ACTIVITIES OR YOUR RELATIONSHIPS WITH OTHERS): 0 - NEVER BOTHERED
RATE HOW BOTHERED YOU ARE BY SYMPTOMS OF YOUR SKIN PROBLEM (EG, ITCHING, STINGING BURNING, HURTING OR SKIN IRRITATION): 0 - NEVER BOTHERED
RATE HOW EMOTIONALLY BOTHERED YOU ARE BY YOUR SKIN PROBLEM (FOR EXAMPLE, WORRY, EMBARRASSMENT, FRUSTRATION): 0 - NEVER BOTHERED
RATE HOW BOTHERED YOU ARE BY EFFECTS OF YOUR SKIN PROBLEMS ON YOUR ACTIVITIES (EG, GOING OUT, ACCOMPLISHING WHAT YOU WANT, WORK ACTIVITIES OR YOUR RELATIONSHIPS WITH OTHERS): 0 - NEVER BOTHERED
RATE HOW EMOTIONALLY BOTHERED YOU ARE BY YOUR SKIN PROBLEM (FOR EXAMPLE, WORRY, EMBARRASSMENT, FRUSTRATION): 0 - NEVER BOTHERED
DATE THE QUALITY-OF-LIFE ASSESSMENT WAS COMPLETED: 67186

## 2024-12-12 NOTE — PROGRESS NOTES
Subjective     Camelia Jones is a 89 y.o. female who presents for the following: Skin Exam.  She notes a dry, flaky scalp recently.  She denies any new, changing, or concerning skin lesions since her last visit; no bleeding, itching, or burning lesions.      Review of Systems:  No other skin or systemic complaints other than what is documented elsewhere in the note.    The following portions of the chart were reviewed this encounter and updated as appropriate:       Skin Cancer History  No skin cancer on file.    Specialty Problems          Dermatology Problems    Wound of skin       Past Dermatologic / Past Relevant Medical History:    - history of several nonmelanoma skin cancers, including:  - most recently BCC on left nasal tip diagnosed in January 2023 s/p Mohs surgery by Dr. Mejia on 3/8/23 and previously BCC on right medial upper forehead diagnosed in July 2022 s/p Mohs surgery by Dr. Duff on 9/19/22, SCC in situ on right ventral mid forearm diagnosed in July 2020 s/p wide local excision with 4-mm margins by Dr. Oneill on 10/12/20, SCC in situ on left nasal sidewall diagnosed in July 2020 s/p Mohs surgery by Dr. Mejia on 9/24/20, and BCC on left nasal tip diagnosed in July 2013 s/p Mohs surgery by Dr. Mejia  - Aks  - rosacea  - seborrheic dermatitis  - no h/o melanoma     Family History:    Mother - nonmelanoma skin cancer  No family history of melanoma    Social History:    The patient is originally from [place] and is retired from working for [employer] in [place]; she has 2 sons and 2 daughters, including Felicia, who works in [employer] for [employer] and accompanies her again today; she recently moved into a nursing home    Allergies:  Azithromycin, Ciprofloxacin, Codeine, Macrolide antibiotics, Penicillins, and Sulfa (sulfonamide antibiotics)    Current Medications / CAM's:    Current Outpatient Medications:     amLODIPine (Norvasc) 5 mg tablet, Take by mouth once  daily., Disp: , Rfl:     aspirin 81 mg EC tablet, Take 1 tablet (81 mg) by mouth once daily., Disp: , Rfl:     biotin 1 mg tablet, Take 1 tablet (1 mg) by mouth., Disp: , Rfl:     calcium carbonate (Oscal) 500 mg calcium (1,250 mg) tablet, Take 250 mg by mouth., Disp: , Rfl:     carvedilol (Coreg) 12.5 mg tablet, Take by mouth., Disp: , Rfl:     cetirizine (ZyrTEC) 10 mg chewable tablet, Chew 1 tablet (10 mg) once daily., Disp: , Rfl:     cyanocobalamin (Vitamin B-12) 1,000 mcg/mL injection, 1,000 mL (1,000,000 mcg)., Disp: , Rfl:     dextromethorphan-guaifenesin (Mucinex DM)  mg 12 hr tablet, Take 1 tablet by mouth every 12 hours. Do not crush, chew, or split., Disp: , Rfl:     docusate sodium (Colace) 100 mg capsule, Take 1 capsule (100 mg) by mouth 2 times a day., Disp: , Rfl:     ferrous sulfate 325 (65 Fe) MG tablet, Take by mouth every 12 hours., Disp: , Rfl:     folic acid/vit B complex and C (B complex-vitamin C-folic acid) 400 mcg tablet extended release, Take by mouth., Disp: , Rfl:     gabapentin (Neurontin) 100 mg capsule, Take 1 capsule (100 mg) by mouth once daily at bedtime., Disp: , Rfl:     KRILL OIL ORAL, Take 1,000 mg by mouth., Disp: , Rfl:     magnesium hydroxide (Milk of Magnesia) 400 mg/5 mL suspension, Take 30 mL by mouth., Disp: , Rfl:     omega-3 acid ethyl esters (Lovaza) 1 gram capsule, Take 1 capsule (1 g) by mouth once daily., Disp: , Rfl:     polyethylene glycol (Miralax) 17 gram/dose powder, as directed Orally, Disp: , Rfl:     pravastatin (Pravachol) 20 mg tablet, Take 1 tablet (20 mg) by mouth once daily., Disp: , Rfl:     sennosides (Senokot) 8.6 mg tablet, Take 1 tablet (8.6 mg) by mouth 2 times a day., Disp: , Rfl:     sodium bicarbonate 650 mg tablet, Take 1 tablet (650 mg) by mouth 4 times a day., Disp: , Rfl:     torsemide (Demadex) 20 mg tablet, Take 1 tablet (20 mg) by mouth once daily., Disp: , Rfl:     zinc gluconate 50 mg tablet, 1 (one) time each day at the same  time., Disp: , Rfl:      Objective   Well appearing patient in no apparent distress; mood and affect are within normal limits.    A waist-up examination was performed including scalp, face, eyes, ears, nose, lips, neck, chest, axillae, abdomen, back, and bilateral upper extremities. All findings within normal limits unless otherwise noted below.        Assessment/Plan   1. Neoplasm of uncertain behavior of skin  Right Mid-Upper Back  5 mm dark brown pigmented, asymmetric macule with an asymmetric pigment network and irregular borders           Shave removal    Lesion length (cm):  0.5  Margin per side (cm):  0.2  Lesion diameter (cm):  0.9  Informed consent: discussed and consent obtained    Timeout: patient name, date of birth, surgical site, and procedure verified    Procedure prep:  Patient was prepped and draped  Anesthesia: the lesion was anesthetized in a standard fashion    Anesthetic:  1% lidocaine w/ epinephrine 1-100,000 local infiltration  Instrument used: flexible razor blade    Hemostasis achieved with: aluminum chloride    Outcome: patient tolerated procedure well    Post-procedure details: sterile dressing applied and wound care instructions given    Dressing type: bandage and petrolatum      Staff Communication: Dermatology Local Anesthesia: 1 % Lidocaine / Epinephrine - Amount:0.5ml    Specimen 1 - Dermatopathology- DERM LAB  Differential Diagnosis: DN v SK  Check Margins Yes/No?:    Comments:    Dermpath Lab: Routine Histopathology (formalin-fixed tissue)    2. Actinic keratosis (6)  Head - Anterior (Face) (6)  Scattered on the patient's bilateral cheeks, there are 6 erythematous, gritty, scaly macules     Actinic Keratoses -scattered on bilateral cheeks.  The pre-cancerous nature of these lesions and treatment options were discussed with the patient today.  At this time, I recommend treatment with liquid nitrogen cryotherapy.  The patient expressed understanding, is in agreement with this plan,  and wishes to proceed with cryotherapy today.    Destr of lesion - Head - Anterior (Face) (6)  Complexity: simple    Destruction method: cryotherapy    Informed consent: discussed and consent obtained    Lesion destroyed using liquid nitrogen: Yes    Cryotherapy cycles:  1  Outcome: patient tolerated procedure well with no complications    Post-procedure details: wound care instructions given      3. Melanocytic nevus of trunk  Scattered on the patient's face, neck, trunk, and bilateral upper extremities, there are several small, round- to oval-shaped, brown-pigmented and pink-colored, symmetric, uniform-appearing macules and dome-shaped papules    Clinically benign- to slightly atypical-appearing nevi - the clinically benign- to slightly atypical-appearing nature of the remainder of the patient's nevi was discussed with the patient today.  None of the patient's nevi, with the exception of the one noted above, meet threshold for biopsy today.  I emphasized the importance of performing monthly self-skin exams using the ABCDs of monitoring moles, which were reviewed with the patient today and an informational hand-out provided.  I also emphasized the importance of sun avoidance and sun protection with daily sunscreen use.    4. Seborrheic keratosis  Scattered on the patient's face, neck, trunk, and bilateral upper extremities, there are multiple tan- to light brown-colored, hyperkeratotic, stuck-on appearing papules of varying size and shape    Seborrheic Keratoses - the benign nature of these lesions was discussed with the patient today and reassurance provided.  No treatment is medically indicated for these lesions at this time.    5. Hemangioma of skin  Scattered on the patient's face, neck, trunk, and bilateral upper extremities, there are multiple small, round, cherry red- to purplish-colored, symmetric, uniform, vascular-appearing macules and papules    Cherry Angiomas - the benign nature of these vascular  lesions was discussed with the patient today and reassurance provided.  No treatment is medically indicated for these lesions at this time.    6. Seborrheic dermatitis  Scalp  On the patient's scalp, there are pink, scaly patches with whitish-yellowish, greasy scale    Seborrheic Dermatitis - flare on scalp.  The potentially chronic and intermittently flaring nature of this condition and treatment options were discussed extensively with the patient today.  At this time, I recommend topical anti-fungal therapy with Ketoconazole 2% shampoo, which the patient was instructed to use 2-3 days per week, alternating with over-the-counter anti-dandruff shampoos, such as Head & Shoulders, Selsun Blue, and Neutrogena T-gel, every month.  The risks, benefits, and side effects of this medication were discussed.  The patient expressed understanding and is in agreement with this plan.    7. History of nonmelanoma skin cancer  On the patient's left nasal tip, right medial upper forehead, right ventral mid forearm, left nasal sidewall, and left nasal tip, there are well-healed scars with no evidence of recurrent growth on exam today.    History of nonmelanoma skin cancers and actinic keratoses and photodamage.  There is no evidence of recurrence at the sites of the patient's previously treated NMSCs on exam today.  The signs and symptoms of skin cancer were reviewed and the patient was advised to practice sun protection and sun avoidance, use daily sunscreen, and perform regular self skin exams.  I will have the patient return to our office in 1 year, pending the above biopsy result, for routine follow-up and skin exam, and the patient was instructed to call our office should the patient notice any new, changing, symptomatic, or otherwise concerning skin lesions before then.  The patient expressed understanding and is in agreement with this plan.    8. Diffuse photodamage of skin  Photodistributed  Diffuse photodamage with actinic  changes with telangiectasia and mottled pigmentation in sun-exposed areas.    Photodamage.  The signs and symptoms of skin cancer were reviewed and the patient was advised to practice sun protection and sun avoidance, use daily sunscreen, and perform regular self skin exams.  Sun protection was discussed, including avoiding the mid-day sun, wearing a sunscreen with SPF at least 50, and stressing the need for reapplication of sunscreen and applying more than they think they need.

## 2024-12-13 ENCOUNTER — APPOINTMENT (OUTPATIENT)
Dept: INFUSION THERAPY | Facility: CLINIC | Age: 89
End: 2024-12-13
Payer: COMMERCIAL

## 2024-12-13 VITALS
HEART RATE: 79 BPM | TEMPERATURE: 97.2 F | RESPIRATION RATE: 18 BRPM | WEIGHT: 130 LBS | DIASTOLIC BLOOD PRESSURE: 79 MMHG | SYSTOLIC BLOOD PRESSURE: 200 MMHG | OXYGEN SATURATION: 98 % | BODY MASS INDEX: 26.26 KG/M2

## 2024-12-13 DIAGNOSIS — N18.4 CKD (CHRONIC KIDNEY DISEASE) STAGE 4, GFR 15-29 ML/MIN (MULTI): ICD-10-CM

## 2024-12-13 DIAGNOSIS — D64.9 ANEMIA, UNSPECIFIED TYPE: ICD-10-CM

## 2024-12-13 RX ORDER — ALBUTEROL SULFATE 0.83 MG/ML
3 SOLUTION RESPIRATORY (INHALATION) AS NEEDED
OUTPATIENT
Start: 2024-12-20

## 2024-12-13 RX ORDER — DIPHENHYDRAMINE HYDROCHLORIDE 50 MG/ML
50 INJECTION INTRAMUSCULAR; INTRAVENOUS AS NEEDED
OUTPATIENT
Start: 2024-12-20

## 2024-12-13 RX ORDER — FAMOTIDINE 10 MG/ML
20 INJECTION INTRAVENOUS ONCE AS NEEDED
OUTPATIENT
Start: 2024-12-20

## 2024-12-13 RX ORDER — EPINEPHRINE 0.3 MG/.3ML
0.3 INJECTION SUBCUTANEOUS EVERY 5 MIN PRN
OUTPATIENT
Start: 2024-12-20

## 2024-12-13 ASSESSMENT — ENCOUNTER SYMPTOMS
EXTREMITY WEAKNESS: 0
WHEEZING: 0
TROUBLE SWALLOWING: 0
DYSURIA: 0
NAUSEA: 0
VOICE CHANGE: 0
APPETITE CHANGE: 1
MYALGIAS: 0
FEVER: 0
HEMATURIA: 0
FREQUENCY: 0
LIGHT-HEADEDNESS: 0
NUMBNESS: 0
EYE PROBLEMS: 0
SORE THROAT: 0
UNEXPECTED WEIGHT CHANGE: 0
HEADACHES: 0
DIARRHEA: 0
CONSTIPATION: 0
SHORTNESS OF BREATH: 0
ABDOMINAL PAIN: 0
VOMITING: 0
PALPITATIONS: 0
ARTHRALGIAS: 0
BLOOD IN STOOL: 0
LEG SWELLING: 1
FATIGUE: 1
DIZZINESS: 0
CHILLS: 0
COUGH: 0
WOUND: 0

## 2024-12-13 ASSESSMENT — PAIN SCALES - GENERAL: PAINLEVEL_OUTOF10: 0-NO PAIN

## 2024-12-13 NOTE — PATIENT INSTRUCTIONS
Today :We administered iron sucrose (Venofer) 200 mg in sodium chloride 0.9% 110 mL IV.     For:   1. CKD (chronic kidney disease) stage 4, GFR 15-29 ml/min (Multi)    2. Anemia, unspecified type         Your next appointment is due in:  one week        Please read the  Medication Guide that was given to you and reviewed during todays visit.     (Tell all doctors including dentists that you are taking this medication)     Go to the emergency room or call 911 if:  -You have signs of allergic reaction:   -Rash, hives, itching.   -Swollen, blistered, peeling skin.   -Swelling of face, lips, mouth, tongue or throat.   -Tightness of chest, trouble breathing, swallowing or talking     Call your doctor:  - If IV / injection site gets red, warm, swollen, itchy or leaks fluid or pus.     (Leave dressing on your IV site for at least 2 hours and keep area clean and dry  - If you get sick or have symptoms of infection or are not feeling well for any reason.    (Wash your hands often, stay away from people who are sick)  - If you have side effects from your medication that do not go away or are bothersome.     (Refer to the teaching your nurse gave you for side effects to call your doctor about)    - Common side effects may include:  stuffy nose, headache, feeling tired, muscle aches, upset stomach  - Before receiving any vaccines     - Call [facility] at   If:  - You get sick, are on antibiotics, have had a recent vaccine, have surgery or dental work and your doctor wants your visit rescheduled.  - You need to cancel and reschedule your visit for any reason. Call at least 2 days before your visit if you need to cancel.   - Your insurance changes before your next visit.    (We will need to get approval from your new insurance. This can take up to two weeks.)     The [facility] is opened Monday thru Friday. We are closed on weekends and holidays.   Voice mail will take your call if the center  is closed. If you leave a message please allow 24 hours for a call back during weekdays. If you leave a message on a weekend/holiday, we will call you back the next business day.    A pharmacist is available Monday - Friday from 8:30AM to 3:30PM to help answer any questions you may have about your prescriptions(s). Please call pharmacy at:    [facility]: [phone]  [facility]: [phone]  [facility]: [phone]

## 2024-12-13 NOTE — PROGRESS NOTES
[facility]   Infusion Clinic Note   Date: 2024   Name: Camelia Jones  : 1934   MRN: [id number]          Reason for Visit: OP Infusion (Venofer and retacrit)         Today: We administered iron sucrose (Venofer) 200 mg in sodium chloride 0.9% 110 mL IV.       Visit Type: INFUSION       Ordered By: Dr Bernard       Accompanied by:Child/Children       Diagnosis: CKD (chronic kidney disease) stage 4, GFR 15-29 ml/min (Multi)    Anemia, unspecified type        Allergies:   Allergies as of 2024 - Reviewed 2024   Allergen Reaction Noted    Azithromycin Nausea Only 2023    Ciprofloxacin Nausea Only 2023    Codeine Nausea Only 2023    Macrolide antibiotics Nausea Only 2023    Penicillins Unknown 2023    Sulfa (sulfonamide antibiotics) Nausea Only 2023          Current Medications has a current medication list which includes the following prescription(s): amlodipine, aspirin, biotin, calcium carbonate, carvedilol, cetirizine, cyanocobalamin, dextromethorphan-guaifenesin, docusate sodium, ferrous sulfate (325 mg ferrous sulfate), b complex-vitamin c-folic acid, gabapentin, krill oil, magnesium hydroxide, omega-3 acid ethyl esters, polyethylene glycol, pravastatin, sennosides, sodium bicarbonate, torsemide, and zinc gluconate.       Vitals:   Vitals:    24 1101 24 1220 24 1245   BP: (!) 194/83  Comment: asymptomatic (!) 205/74 (!) 200/79   Pulse: 72 77 79   Resp: 18 18 18   Temp: 36.3 °C (97.3 °F) 36.3 °C (97.4 °F) 36.2 °C (97.2 °F)   SpO2: 97% 99% 98%   Weight: 59 kg (130 lb)     PainSc: 0-No pain         Pt denies headache, nausea, blurred vision.        Infusion Pre-procedure Checklist:   - Allergies reviewed: yes   - Medications reviewed: yes       - Previous reaction to current treatment: no      Assess patient for the concerns below. Document provider notification as appropriate.  - Active or recent infection  with/without current antibiotic use: n/a  - Recent or planned invasive dental work: n/a  - Recent or planned surgeries: n/a  - Recently received or plans to receive vaccinations: n/a  - Has treatment related toxicities: no  - Is pregnant:  n/a      Pain: 0   - Is the pain different from normal: n/a   - Is your Doctor aware:  n/a       Labs: N/A          Fall Risk Screening:         Review Of Systems:  Review of Systems   Constitutional:  Positive for appetite change and fatigue. Negative for chills, fever and unexpected weight change.   HENT:   Negative for hearing loss, mouth sores, sore throat, tinnitus, trouble swallowing and voice change.    Eyes:  Negative for eye problems.   Respiratory:  Negative for cough, shortness of breath and wheezing.    Cardiovascular:  Positive for leg swelling. Negative for chest pain and palpitations.        BLE Edema   Gastrointestinal:  Negative for abdominal pain, blood in stool, constipation, diarrhea, nausea and vomiting.   Genitourinary:  Negative for dysuria, frequency and hematuria.    Musculoskeletal:  Negative for arthralgias and myalgias.   Skin:  Negative for itching, rash and wound.   Neurological:  Negative for dizziness, extremity weakness, headaches, light-headedness and numbness.         ROS completed? Yes      Infusion Readiness:  - Assessment Concerns Related to Infusion: Yes  - Provider notified: yes Elevated BP, 194/83 asymptomatic, Per pt and daughter amlodipine recently decreased by half to 2.5mg per day. NP messaged Dr Bernard to make him aware.      Document Below Only If Indicated:   New Patient Education:    N/A (returning patient for continuation of therapy. Ongoing education provided as needed.)        Treatment Conditions & Drug Specific Questions:    Epoetin Ángel (EPOGEN. PROCRIT, RETACRIT)    TREATMENT CONDITIONS:    Unless otherwise specified on patient specific therapy plan HOLD and notify provider prior to proceeding with today's injection  "if:  o Hemoglobin GREATER THAN 11 g/dL (parameter set by prescribing provider)  o Increase in hemoglobin GREATER THAN 1 g/dL   o SBP GREATER THAN 160 mmHg    Lab Results   Component Value Date/Time    HGB 9.2 (A) 12/06/2024 1100    HGB 8.7 (L) 09/21/2023 0536    HGB 8.2 (L) 09/20/2023 0524    HGB 8.3 (L) 09/19/2023 0513     No results found for: \"HGBCAP\"     Labs reviewed and patient meets treatment conditions? Yes    DRUG SPECIFIC QUESTIONS:  - Does the patient have uncontrolled hypertension?  Yes    (Use is contraindicated in patients with uncontrolled hypertension)     - Educate on the following:? Yes       - Increased risk of serious cardiovascular reactions, thromboembolic reactions, stroke and tumor progression.        - Need to undergo regular blood pressure monitoring. Adhere to prescribed anti-hypertensive regimen and follow recommended dietary restrictions.        - Need to contact their healthcare provider for new onset neurologic symptoms or change in seizure frequency.        - Need for  regular laboratory tests to monitor hemoglobin levels.   and Iron Sucrose (VENOFER)     (Unless otherwise specified on patient specific therapy plan):    REMINDERS:  May cause transient hypotension. Supine position recommended for infusion.   Pregnancy test prior to first dose for patients of childbearing age.    Recommended Vitals/Observation:  Vitals: Obtain vital signs before and after each infusion.  Observation: 30 minutes after the infusion is complete.    Lab Results   Component Value Date    IRON 36 08/21/2023    TIBC 210 (L) 08/21/2023    FERRITIN 1,113 (H) 08/21/2023     Lab Results   Component Value Date    IRONSAT 17 (L) 08/21/2023      Lab Results   Component Value Date    WBC 7.9 09/21/2023    HGB 9.2 (A) 12/06/2024    HCT 28.1 (L) 09/21/2023    MCV 91 09/21/2023     09/21/2023            Weight Based Drug Calculations:    WEIGHT BASED DRUGS: NOT APPLICABLE / FLAT DOSE          Initiated By: Margarette HUNT" Sharonda RN   1300 30 minutes post infusion vital signs stable. Pt continues to be hypertensive, asymptomatic. Pt and daughter refused to go to the ER. Dr Bernard aware of elevated BP and new adjustment/decrease made to BP meds-his office will make contact with pt and daughter. Patient tolerated infusion well, no signs or symptoms of reaction.

## 2024-12-16 ENCOUNTER — APPOINTMENT (OUTPATIENT)
Dept: DERMATOLOGY | Facility: CLINIC | Age: 89
End: 2024-12-16
Payer: COMMERCIAL

## 2024-12-16 DIAGNOSIS — D18.01 HEMANGIOMA OF SKIN: ICD-10-CM

## 2024-12-16 DIAGNOSIS — Z85.828 HISTORY OF NONMELANOMA SKIN CANCER: ICD-10-CM

## 2024-12-16 DIAGNOSIS — D22.5 MELANOCYTIC NEVUS OF TRUNK: ICD-10-CM

## 2024-12-16 DIAGNOSIS — L21.9 SEBORRHEIC DERMATITIS: ICD-10-CM

## 2024-12-16 DIAGNOSIS — L57.8 DIFFUSE PHOTODAMAGE OF SKIN: ICD-10-CM

## 2024-12-16 DIAGNOSIS — L82.1 SEBORRHEIC KERATOSIS: ICD-10-CM

## 2024-12-16 DIAGNOSIS — D48.5 NEOPLASM OF UNCERTAIN BEHAVIOR OF SKIN: Primary | ICD-10-CM

## 2024-12-16 DIAGNOSIS — L57.0 ACTINIC KERATOSIS: ICD-10-CM

## 2024-12-16 PROCEDURE — 99214 OFFICE O/P EST MOD 30 MIN: CPT | Performed by: DERMATOLOGY

## 2024-12-16 PROCEDURE — 1157F ADVNC CARE PLAN IN RCRD: CPT | Performed by: DERMATOLOGY

## 2024-12-16 PROCEDURE — 11301 SHAVE SKIN LESION 0.6-1.0 CM: CPT | Performed by: DERMATOLOGY

## 2024-12-16 PROCEDURE — 17003 DESTRUCT PREMALG LES 2-14: CPT | Performed by: DERMATOLOGY

## 2024-12-16 PROCEDURE — 17000 DESTRUCT PREMALG LESION: CPT | Performed by: DERMATOLOGY

## 2024-12-19 LAB
LAB AP ASR DISCLAIMER: NORMAL
LABORATORY COMMENT REPORT: NORMAL
PATH REPORT.FINAL DX SPEC: NORMAL
PATH REPORT.GROSS SPEC: NORMAL
PATH REPORT.RELEVANT HX SPEC: NORMAL
PATH REPORT.TOTAL CANCER: NORMAL

## 2024-12-20 ENCOUNTER — APPOINTMENT (OUTPATIENT)
Dept: INFUSION THERAPY | Facility: CLINIC | Age: 89
End: 2024-12-20
Payer: COMMERCIAL

## 2024-12-20 VITALS
HEART RATE: 72 BPM | DIASTOLIC BLOOD PRESSURE: 80 MMHG | BODY MASS INDEX: 26.26 KG/M2 | SYSTOLIC BLOOD PRESSURE: 138 MMHG | RESPIRATION RATE: 18 BRPM | TEMPERATURE: 97.7 F | OXYGEN SATURATION: 98 % | WEIGHT: 130 LBS

## 2024-12-20 DIAGNOSIS — N18.4 CKD (CHRONIC KIDNEY DISEASE) STAGE 4, GFR 15-29 ML/MIN (MULTI): ICD-10-CM

## 2024-12-20 DIAGNOSIS — D64.9 ANEMIA, UNSPECIFIED TYPE: ICD-10-CM

## 2024-12-20 LAB — POC HEMOGLOBIN: 8.8 G/DL (ref 12–16)

## 2024-12-20 RX ORDER — ALBUTEROL SULFATE 0.83 MG/ML
3 SOLUTION RESPIRATORY (INHALATION) AS NEEDED
OUTPATIENT
Start: 2024-12-27

## 2024-12-20 RX ORDER — EPINEPHRINE 0.3 MG/.3ML
0.3 INJECTION SUBCUTANEOUS EVERY 5 MIN PRN
OUTPATIENT
Start: 2024-12-27

## 2024-12-20 RX ORDER — FAMOTIDINE 10 MG/ML
20 INJECTION INTRAVENOUS ONCE AS NEEDED
OUTPATIENT
Start: 2024-12-27

## 2024-12-20 RX ORDER — DIPHENHYDRAMINE HYDROCHLORIDE 50 MG/ML
50 INJECTION INTRAMUSCULAR; INTRAVENOUS AS NEEDED
OUTPATIENT
Start: 2024-12-27

## 2024-12-20 ASSESSMENT — ENCOUNTER SYMPTOMS
SORE THROAT: 0
LIGHT-HEADEDNESS: 0
EXTREMITY WEAKNESS: 0
CHILLS: 0
LEG SWELLING: 0
FREQUENCY: 0
UNEXPECTED WEIGHT CHANGE: 0
DYSURIA: 0
ARTHRALGIAS: 0
PALPITATIONS: 0
NUMBNESS: 0
BLOOD IN STOOL: 0
MYALGIAS: 0
VOMITING: 0
DIARRHEA: 0
CONSTIPATION: 0
FEVER: 0
EYE PROBLEMS: 0
WOUND: 0
SHORTNESS OF BREATH: 0
NAUSEA: 0
ABDOMINAL PAIN: 0
COUGH: 0
HEADACHES: 0
DIZZINESS: 0
TROUBLE SWALLOWING: 0
VOICE CHANGE: 0
HEMATURIA: 0
WHEEZING: 0
FATIGUE: 1
APPETITE CHANGE: 0

## 2024-12-20 ASSESSMENT — PAIN SCALES - GENERAL: PAINLEVEL_OUTOF10: 0-NO PAIN

## 2024-12-20 NOTE — PROGRESS NOTES
[facility]   Infusion Clinic Note   Date: 2024   Name: Camelia Jones  : 1934   MRN: [id number]          Reason for Visit: OP Infusion (Venofer 200 mg dose 4/5/Retacrit 20,000 Units q2 weeks)         Today: We administered epoetin willian-epbx and iron sucrose (Venofer) 200 mg in sodium chloride 0.9% 110 mL IV.       Visit Type: INFUSION       Ordered By: Dr Bernard       Accompanied by:Child/Children       Diagnosis: Anemia, unspecified type    CKD (chronic kidney disease) stage 4, GFR 15-29 ml/min (Multi)        Allergies:   Allergies as of 2024 - Reviewed 2024   Allergen Reaction Noted    Azithromycin Nausea Only 2023    Ciprofloxacin Nausea Only 2023    Codeine Nausea Only 2023    Macrolide antibiotics Nausea Only 2023    Penicillins Unknown 2023    Sulfa (sulfonamide antibiotics) Nausea Only 2023          Current Medications has a current medication list which includes the following prescription(s): amlodipine, aspirin, biotin, calcium carbonate, carvedilol, cetirizine, cyanocobalamin, dextromethorphan-guaifenesin, docusate sodium, ferrous sulfate (325 mg ferrous sulfate), b complex-vitamin c-folic acid, gabapentin, krill oil, magnesium hydroxide, omega-3 acid ethyl esters, polyethylene glycol, pravastatin, sennosides, sodium bicarbonate, torsemide, and zinc gluconate.       Vitals:   Vitals:    24 1110 24 1139 24 1210 24 1250   BP: 173/90 148/68 135/70 138/80   Pulse: 73 76 76 72   Resp: 18  18 18   Temp: 36.5 °C (97.7 °F)      SpO2: 97%   98%   Weight: 59 kg (130 lb)      PainSc: 0-No pain                Infusion Pre-procedure Checklist:   - Allergies reviewed: yes   - Medications reviewed: yes       - Previous reaction to current treatment: no      Assess patient for the concerns below. Document provider notification as appropriate.  - Active or recent infection with/without current antibiotic  use: no  - Recent or planned invasive dental work: no  - Recent or planned surgeries: no  - Recently received or plans to receive vaccinations: no  - Has treatment related toxicities: no  - Is pregnant:  n/a      Pain: 0   - Is the pain different from normal: no   - Is your Doctor aware:  n/a       Labs:  Hemoglobin 8.8 via POCT testing          Fall Risk Screening: Knight Fall Risk  History of Falling, Immediate or Within 3 Months: No  Secondary Diagnosis: No  Ambulatory Aid: Crutches/cane/walker  Intravenous Therapy/Heparin Lock: No  Gait/Transferring: Weak  Mental Status: Oriented to own ability  Knight Fall Risk Score: 25       Review Of Systems:  Review of Systems   Constitutional:  Positive for fatigue. Negative for appetite change, chills, fever and unexpected weight change.   HENT:   Negative for hearing loss, mouth sores, sore throat, tinnitus, trouble swallowing and voice change.    Eyes:  Negative for eye problems.   Respiratory:  Negative for cough, shortness of breath and wheezing.    Cardiovascular:  Negative for chest pain, leg swelling and palpitations.   Gastrointestinal:  Negative for abdominal pain, blood in stool, constipation, diarrhea, nausea and vomiting.   Genitourinary:  Negative for dysuria, frequency and hematuria.    Musculoskeletal:  Positive for gait problem (walks with walker, w/c for distance). Negative for arthralgias and myalgias.   Skin:  Negative for itching, rash and wound.   Neurological:  Positive for gait problem (walks with walker, w/c for distance). Negative for dizziness, extremity weakness, headaches, light-headedness and numbness.         ROS completed? Yes      Infusion Readiness:  - Assessment Concerns Related to Infusion: No  - Provider notified: n/a      Document Below Only If Indicated:   New Patient Education:    NEW PATIENT MEDICATION EDUCATION PT PROVIDED WITH WRITTEN ([employer] PT EDUCATION SHEET) AND VERBAL EDUCATION REGARDING MEDICATION GIVEN. VERIFIED MEDICATION  "NAME WITH PATIENT AND DISCUSSED REASON FOR USE. BRIEFLY DISCUSSED HOW MEDICATION WORKS AND EDUCATED ON GOAL OF TREATMENT, FREQUENCY OF TREATMENT, ADVERSE RXN'S AND COMMON SIDE EFFECTS TO MONITOR FOR. INSTRUCTED PT TO ASSURE THAT ALL PROVIDERS INCLUDING DENTISTS ARE AWARE OF MEDICATION RECEIVED. DISCUSSED FLOW OF VISIT AND ORIENTED TO INFUSION CENTER. PT VERBALIZES UNDERSTANDING. CALL LIGHT PROVIDED AND PT AWARE TO ALERT STAFF OF ANY CONCERNS DURING TREATMENT.        Treatment Conditions & Drug Specific Questions:    Epoetin Ángel (EPOGEN. PROCRIT, RETACRIT)    TREATMENT CONDITIONS:    Unless otherwise specified on patient specific therapy plan HOLD and notify provider prior to proceeding with today's injection if:  o Hemoglobin GREATER THAN 11 g/dL (parameter set by prescribing provider)  o Increase in hemoglobin GREATER THAN 1 g/dL   o SBP GREATER THAN 160 mmHg    Lab Results   Component Value Date/Time    HGB 8.8 (A) 12/20/2024 1121    HGB 9.2 (A) 12/06/2024 1100    HGB 8.7 (L) 09/21/2023 0536    HGB 8.2 (L) 09/20/2023 0524    HGB 8.3 (L) 09/19/2023 0513     No results found for: \"HGBCAP\"     Labs reviewed and patient meets treatment conditions? Yes    DRUG SPECIFIC QUESTIONS:  - Does the patient have uncontrolled hypertension?  No    (Use is contraindicated in patients with uncontrolled hypertension)     - Educate on the following:? Yes       - Increased risk of serious cardiovascular reactions, thromboembolic reactions, stroke and tumor progression.        - Need to undergo regular blood pressure monitoring. Adhere to prescribed anti-hypertensive regimen and follow recommended dietary restrictions.        - Need to contact their healthcare provider for new onset neurologic symptoms or change in seizure frequency.        - Need for  regular laboratory tests to monitor hemoglobin levels.          (Unless otherwise specified on patient specific therapy plan):    REMINDERS:  May cause transient hypotension. Supine " position recommended for infusion.   Pregnancy test prior to first dose for patients of childbearing age.    Recommended Vitals/Observation:  Vitals: Obtain vital signs before and after each infusion.  Observation: 30 minutes after the infusion is complete. Observation complete.      Lab Results   Component Value Date    IRON 36 08/21/2023    TIBC 210 (L) 08/21/2023    FERRITIN 1,113 (H) 08/21/2023     Lab Results   Component Value Date    IRONSAT 17 (L) 08/21/2023      Lab Results   Component Value Date    WBC 7.9 09/21/2023    HGB 8.8 (A) 12/20/2024    HCT 28.1 (L) 09/21/2023    MCV 91 09/21/2023     09/21/2023           Weight Based Drug Calculations:    WEIGHT BASED DRUGS: NOT APPLICABLE / FLAT DOSE          Initiated By: Brenda Castellon RN   1250- 30 minutes post infusion vital signs stable. Patient tolerated well, no signs or symptoms of reaction.   _________________________________________________________________________    Note authored and patient cared for by: Brenda Castellon RN  Note/Encounter reviewed by: Rasheeda GRAFF NP. This provider on site at time of patient infusion. Infusion staff to notify this provider of any questions, concerns, abnormals or issues during infusion.    Final check of medication completed by this [facility] / or on-site pharmacist with administering nurse using positive identification prior to administration. Final appearance of product checked for accuracy and conformity to the formula of the prepared product. Assured use of correct ingredients, accurate calculations and precise measurements under appropriate conditions and procedures.    No issues reported during today's encounter. Pt. tolerated infusion without difficulty. Pt. not independently evaluated by this provider during today's encounter.  (Rasheeda GRAFF NP)

## 2024-12-27 ENCOUNTER — APPOINTMENT (OUTPATIENT)
Dept: INFUSION THERAPY | Facility: CLINIC | Age: 89
End: 2024-12-27
Payer: COMMERCIAL

## 2024-12-27 VITALS
TEMPERATURE: 98.1 F | SYSTOLIC BLOOD PRESSURE: 160 MMHG | HEART RATE: 72 BPM | OXYGEN SATURATION: 98 % | BODY MASS INDEX: 26.05 KG/M2 | RESPIRATION RATE: 16 BRPM | WEIGHT: 129 LBS | DIASTOLIC BLOOD PRESSURE: 72 MMHG

## 2024-12-27 DIAGNOSIS — N18.4 CKD (CHRONIC KIDNEY DISEASE) STAGE 4, GFR 15-29 ML/MIN (MULTI): ICD-10-CM

## 2024-12-27 DIAGNOSIS — D64.9 ANEMIA, UNSPECIFIED TYPE: ICD-10-CM

## 2024-12-27 RX ORDER — FAMOTIDINE 10 MG/ML
20 INJECTION INTRAVENOUS ONCE AS NEEDED
Status: CANCELLED | OUTPATIENT
Start: 2024-12-27

## 2024-12-27 RX ORDER — EPINEPHRINE 0.3 MG/.3ML
0.3 INJECTION SUBCUTANEOUS EVERY 5 MIN PRN
Status: CANCELLED | OUTPATIENT
Start: 2024-12-27

## 2024-12-27 RX ORDER — ALBUTEROL SULFATE 0.83 MG/ML
3 SOLUTION RESPIRATORY (INHALATION) AS NEEDED
Status: CANCELLED | OUTPATIENT
Start: 2024-12-27

## 2024-12-27 RX ORDER — DIPHENHYDRAMINE HYDROCHLORIDE 50 MG/ML
50 INJECTION INTRAMUSCULAR; INTRAVENOUS AS NEEDED
Status: CANCELLED | OUTPATIENT
Start: 2024-12-27

## 2024-12-27 ASSESSMENT — ENCOUNTER SYMPTOMS
NUMBNESS: 0
HEADACHES: 0
HEMATURIA: 0
LIGHT-HEADEDNESS: 0
ARTHRALGIAS: 0
VOMITING: 0
FATIGUE: 1
FREQUENCY: 0
SORE THROAT: 0
FEVER: 0
BLOOD IN STOOL: 0
PALPITATIONS: 0
TROUBLE SWALLOWING: 0
LEG SWELLING: 0
COUGH: 0
NAUSEA: 0
CONSTIPATION: 0
VOICE CHANGE: 0
DIZZINESS: 0
MYALGIAS: 0
UNEXPECTED WEIGHT CHANGE: 0
WOUND: 0
CHILLS: 0
SHORTNESS OF BREATH: 0
DIARRHEA: 0
APPETITE CHANGE: 0
ABDOMINAL PAIN: 0
WHEEZING: 0
EYE PROBLEMS: 0
EXTREMITY WEAKNESS: 0
DYSURIA: 0

## 2024-12-27 ASSESSMENT — PAIN SCALES - GENERAL: PAINLEVEL_OUTOF10: 0-NO PAIN

## 2024-12-27 NOTE — PROGRESS NOTES
[facility]   Infusion Clinic Note   Date: 2024   Name: Camelia Jones  : 1934   MRN: [id number]          Reason for Visit: OP Infusion (Venofer 200mg day )   Therapy Plan Complete       Today: We administered iron sucrose (Venofer) 200 mg in sodium chloride 0.9% 110 mL IV.       Visit Type: INFUSION       Ordered By: Dr Bernard       Accompanied by:Child/Children       Diagnosis: CKD (chronic kidney disease) stage 4, GFR 15-29 ml/min (Multi)    Anemia, unspecified type        Allergies:   Allergies as of 2024 - Reviewed 2024   Allergen Reaction Noted    Azithromycin Nausea Only 2023    Ciprofloxacin Nausea Only 2023    Codeine Nausea Only 2023    Macrolide antibiotics Nausea Only 2023    Penicillins Unknown 2023    Sulfa (sulfonamide antibiotics) Nausea Only 2023          Current Medications has a current medication list which includes the following prescription(s): amlodipine, aspirin, biotin, calcium carbonate, carvedilol, cetirizine, cyanocobalamin, dextromethorphan-guaifenesin, docusate sodium, ferrous sulfate (325 mg ferrous sulfate), b complex-vitamin c-folic acid, gabapentin, krill oil, magnesium hydroxide, omega-3 acid ethyl esters, polyethylene glycol, pravastatin, sennosides, sodium bicarbonate, torsemide, and zinc gluconate.       Vitals:   Vitals:    24 1024 24 1142 24 1154   BP: 157/55 156/62 160/72   Pulse: 73 72 72   Resp: 18 18 16   Temp: 36.6 °C (97.9 °F) 37.1 °C (98.8 °F) 36.7 °C (98.1 °F)   SpO2: 99% 99% 98%   Weight: 58.5 kg (129 lb)     PainSc: 0-No pain               Infusion Pre-procedure Checklist:   - Allergies reviewed: yes   - Medications reviewed: yes       - Previous reaction to current treatment: no      Assess patient for the concerns below. Document provider notification as appropriate.  - Active or recent infection with/without current antibiotic use: n/a  - Recent or  planned invasive dental work: n/a  - Recent or planned surgeries: n/a  - Recently received or plans to receive vaccinations: n/a  - Has treatment related toxicities: no  - Is pregnant:  n/a      Pain: 0   - Is the pain different from normal: n/a   - Is your Doctor aware:  n/a       Labs: N/A          Fall Risk Screening: Knight Fall Risk  History of Falling, Immediate or Within 3 Months: No  Secondary Diagnosis: No  Ambulatory Aid: Crutches/cane/walker  Intravenous Therapy/Heparin Lock: Yes  Gait/Transferring: Normal/bedrest/immobile  Mental Status: Oriented to own ability  Knight Fall Risk Score: 35       Review Of Systems:  Review of Systems   Constitutional:  Positive for fatigue. Negative for appetite change, chills, fever and unexpected weight change.   HENT:   Negative for hearing loss, mouth sores, sore throat, tinnitus, trouble swallowing and voice change.    Eyes:  Negative for eye problems.   Respiratory:  Negative for cough, shortness of breath and wheezing.    Cardiovascular:  Negative for chest pain, leg swelling and palpitations.   Gastrointestinal:  Negative for abdominal pain, blood in stool, constipation, diarrhea, nausea and vomiting.   Genitourinary:  Negative for dysuria, frequency and hematuria.    Musculoskeletal:  Positive for gait problem. Negative for arthralgias and myalgias.   Skin:  Negative for itching, rash and wound.   Neurological:  Positive for gait problem. Negative for dizziness, extremity weakness, headaches, light-headedness and numbness.         ROS completed? Yes      Infusion Readiness:  - Assessment Concerns Related to Infusion: No  - Provider notified: n/a      Document Below Only If Indicated:   New Patient Education:    N/A (returning patient for continuation of therapy. Ongoing education provided as needed.)        Treatment Conditions & Drug Specific Questions:    Iron Sucrose (VENOFER)     (Unless otherwise specified on patient specific therapy plan):    REMINDERS:  May  cause transient hypotension. Supine position recommended for infusion.   Pregnancy test prior to first dose for patients of childbearing age.    Recommended Vitals/Observation:  Vitals: Obtain vital signs before and after each infusion.  Observation: 30 minutes after the infusion is complete. Observation complete.      Lab Results   Component Value Date    IRON 36 08/21/2023    TIBC 210 (L) 08/21/2023    FERRITIN 1,113 (H) 08/21/2023     Lab Results   Component Value Date    IRONSAT 17 (L) 08/21/2023      Lab Results   Component Value Date    WBC 7.9 09/21/2023    HGB 8.8 (A) 12/20/2024    HCT 28.1 (L) 09/21/2023    MCV 91 09/21/2023     09/21/2023            Weight Based Drug Calculations:    WEIGHT BASED DRUGS: NOT APPLICABLE / FLAT DOSE          Initiated By: Margarette Mcdonough, RN   5416 30 minutes post infusion vital signs stable. Patient tolerated well, no signs or symptoms of reaction.

## 2024-12-27 NOTE — PATIENT INSTRUCTIONS
Today :We administered iron sucrose (Venofer) 200 mg in sodium chloride 0.9% 110 mL IV.     For:   1. CKD (chronic kidney disease) stage 4, GFR 15-29 ml/min (Multi)    2. Anemia, unspecified type         Your next appointment is due in:  Therapy Plan Complete        Please read the  Medication Guide that was given to you and reviewed during todays visit.     (Tell all doctors including dentists that you are taking this medication)     Go to the emergency room or call 911 if:  -You have signs of allergic reaction:   -Rash, hives, itching.   -Swollen, blistered, peeling skin.   -Swelling of face, lips, mouth, tongue or throat.   -Tightness of chest, trouble breathing, swallowing or talking     Call your doctor:  - If IV / injection site gets red, warm, swollen, itchy or leaks fluid or pus.     (Leave dressing on your IV site for at least 2 hours and keep area clean and dry  - If you get sick or have symptoms of infection or are not feeling well for any reason.    (Wash your hands often, stay away from people who are sick)  - If you have side effects from your medication that do not go away or are bothersome.     (Refer to the teaching your nurse gave you for side effects to call your doctor about)    - Common side effects may include:  stuffy nose, headache, feeling tired, muscle aches, upset stomach  - Before receiving any vaccines     - [facility] at   If:  - You get sick, are on antibiotics, have had a recent vaccine, have surgery or dental work and your doctor wants your visit rescheduled.  - You need to cancel and reschedule your visit for any reason. Call at least 2 days before your visit if you need to cancel.   - Your insurance changes before your next visit.    (We will need to get approval from your new insurance. This can take up to two weeks.)     [facility] is opened Monday thru Friday. We are closed on weekends and holidays.   Voice mail will take your call  if the center is closed. If you leave a message please allow 24 hours for a call back during weekdays. If you leave a message on a weekend/holiday, we will call you back the next business day.    A pharmacist is available Monday - Friday from 8:30AM to 3:30PM to help answer any questions you may have about your prescriptions(s). Please call pharmacy at:    [facility]: [phone]  [facility]: [phone]  [facility]: [phone]

## 2025-01-02 ENCOUNTER — NURSING HOME VISIT (OUTPATIENT)
Dept: POST ACUTE CARE | Facility: EXTERNAL LOCATION | Age: OVER 89
End: 2025-01-02
Payer: COMMERCIAL

## 2025-01-02 DIAGNOSIS — M17.11 PRIMARY OSTEOARTHRITIS OF RIGHT KNEE: ICD-10-CM

## 2025-01-02 DIAGNOSIS — M17.12 PRIMARY OSTEOARTHRITIS OF LEFT KNEE: ICD-10-CM

## 2025-01-02 DIAGNOSIS — D64.9 ANEMIA, UNSPECIFIED TYPE: ICD-10-CM

## 2025-01-02 DIAGNOSIS — N18.5 CHRONIC KIDNEY DISEASE, STAGE V (MULTI): ICD-10-CM

## 2025-01-02 PROCEDURE — 99309 SBSQ NF CARE MODERATE MDM 30: CPT | Performed by: FAMILY MEDICINE

## 2025-01-02 NOTE — LETTER
Patient: Camelia Jones  : 1934    Encounter Date: 2025    Resident seen 25 -- MP    CC: LTC ([facility]) Recheck    : 1934  SNF H&P done 23, 23 (EPIC)  Allergy: Azithro, Codeine, PCN, Cipro, Sulfa, Corn  DNR-CCA, NO HD    S: 89 yo woman with HLD, CKD4, HTN, OA anemia and hyponatremia. C/O b/l knee pain. Med List & Problem list reviewed. No cp/sob.    O: VSS AFEB 131# (up 5#) A&O NAD. Chest cta. heart rrr, 2 BRIDGET. Ext left LE Edema 3+ > Right 2+ wearing light weight compression stockings.    LAB (25) PENDING Iron, Ferritin, CBC, and CMP  (24) Ferritin 844, Iron 39, TIBC 210  (24) Hgb 9.4->8.7,  (10/31/24) Cr 2.81, GFR 15, K 4.2, Na 142, TSH 3.93, Alb 4.1, CO2 17->24  (10/23/24) Alb 3.4, ->6152->560  (2024) , HDL 52, LDL 71  (23) Ferritin 1165, Iron 25->31, TIBC 190  (23) Urine Electrophoresis -- No M Protein. Urine osmolality 513.    CXR (6/10/24) Clear (no infiltrate, no CHF)    24 PROCEDURE: verbal consent obtained. B/L knee lateral joint aspiration & injection of 3 cc 2% lidocaine+ kenalog 60. Pt tolerated well.    A/P:  # Muscle weakness and gait instability: Largely Wheelchair bound. Walks with walker at times.  # Left>Right knee OA: improved with topical blue emu and CSI 24 -- plan CSI 25.  # Hx Hyponatremia: resolved OFF salt tablet. Monitor levels. Continue FR to 1800 cc 24.  # CHF/Edema: stable on torsemide 20 mg galvez.  # Lumbar spondylosis and hx L5 & Sacral Ala fx: pain control inadequate with tylenol. Pt refuses oxycodone and tramadol. Completed SNF Rehab. LTC. F/U with ortho. Lidoderm patch.  # Depression and Chronic pain: patient declines offer of cymbalta 30 mg qam.  # B/L heel pain c/w PF -- night splints.  # HTN: stable -- cut norvasc to 5, Coreg 12.5 bid.  # HLD: statin, ASA  # Anemia: persistent despite B12 replenishment. Getting Iron infusions and EPO per Nephro -- next appt 1/3/25.  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  F/U Nephro [place] (seen 11/19/24). NO HD per patients expressed advance directives. Stable on NaHCO3 for metabolic acidosis.  # Insomnia: Patient declines Ambien, melatonin and tramadol. Offer herbal tea, capsaicin cream.  # Hx Constipation: OFF tums, Ca, & Fe. Continue colace & miralax.      Electronically Signed By: Trav Rodriguez MD   1/11/25  4:58 PM

## 2025-01-03 ENCOUNTER — APPOINTMENT (OUTPATIENT)
Dept: INFUSION THERAPY | Facility: CLINIC | Age: OVER 89
End: 2025-01-03
Payer: COMMERCIAL

## 2025-01-03 VITALS
HEART RATE: 66 BPM | OXYGEN SATURATION: 98 % | SYSTOLIC BLOOD PRESSURE: 154 MMHG | DIASTOLIC BLOOD PRESSURE: 72 MMHG | RESPIRATION RATE: 18 BRPM | TEMPERATURE: 98.3 F

## 2025-01-03 DIAGNOSIS — N18.4 CKD (CHRONIC KIDNEY DISEASE) STAGE 4, GFR 15-29 ML/MIN (MULTI): ICD-10-CM

## 2025-01-03 LAB — POC HEMOGLOBIN: 10.2 G/DL (ref 12–16)

## 2025-01-03 RX ORDER — ALBUTEROL SULFATE 0.83 MG/ML
3 SOLUTION RESPIRATORY (INHALATION) AS NEEDED
OUTPATIENT
Start: 2025-01-17

## 2025-01-03 RX ORDER — EPINEPHRINE 0.3 MG/.3ML
0.3 INJECTION SUBCUTANEOUS EVERY 5 MIN PRN
OUTPATIENT
Start: 2025-01-17

## 2025-01-03 RX ORDER — DIPHENHYDRAMINE HYDROCHLORIDE 50 MG/ML
50 INJECTION INTRAMUSCULAR; INTRAVENOUS AS NEEDED
OUTPATIENT
Start: 2025-01-17

## 2025-01-03 RX ORDER — FAMOTIDINE 10 MG/ML
20 INJECTION INTRAVENOUS ONCE AS NEEDED
OUTPATIENT
Start: 2025-01-17

## 2025-01-03 ASSESSMENT — ENCOUNTER SYMPTOMS
EXTREMITY WEAKNESS: 0
PALPITATIONS: 0
NUMBNESS: 0
LEG SWELLING: 1
SHORTNESS OF BREATH: 0
COUGH: 0
WOUND: 0
LIGHT-HEADEDNESS: 0
WHEEZING: 0
DIZZINESS: 0

## 2025-01-03 ASSESSMENT — PAIN SCALES - GENERAL: PAINLEVEL_OUTOF10: 0-NO PAIN

## 2025-01-03 NOTE — PATIENT INSTRUCTIONS
Today :We administered epoetin willian-epbx.     For:   1. CKD (chronic kidney disease) stage 4, GFR 15-29 ml/min (Multi)         Your next appointment is due in:  two weeks        Please read the  Medication Guide that was given to you and reviewed during todays visit.     (Tell all doctors including dentists that you are taking this medication)     Go to the emergency room or call 911 if:  -You have signs of allergic reaction:   -Rash, hives, itching.   -Swollen, blistered, peeling skin.   -Swelling of face, lips, mouth, tongue or throat.   -Tightness of chest, trouble breathing, swallowing or talking     Call your doctor:  - If IV / injection site gets red, warm, swollen, itchy or leaks fluid or pus.     (Leave dressing on your IV site for at least 2 hours and keep area clean and dry  - If you get sick or have symptoms of infection or are not feeling well for any reason.    (Wash your hands often, stay away from people who are sick)  - If you have side effects from your medication that do not go away or are bothersome.     (Refer to the teaching your nurse gave you for side effects to call your doctor about)    - Common side effects may include:  stuffy nose, headache, feeling tired, muscle aches, upset stomach  - Before receiving any vaccines     - Call the [facility] at   If:  - You get sick, are on antibiotics, have had a recent vaccine, have surgery or dental work and your doctor wants your visit rescheduled.  - You need to cancel and reschedule your visit for any reason. Call at least 2 days before your visit if you need to cancel.   - Your insurance changes before your next visit.    (We will need to get approval from your new insurance. This can take up to two weeks.)     [facility] is opened Monday thru Friday. We are closed on weekends and holidays.   Voice mail will take your call if the center is closed. If you leave a message please allow 24 hours for a call back  during weekdays. If you leave a message on a weekend/holiday, we will call you back the next business day.    A pharmacist is available Monday - Friday from 8:30AM to 3:30PM to help answer any questions you may have about your prescriptions(s). Please call pharmacy at:    [facility]: [phone]  [facility]: [phone]  [facility]: [phone]

## 2025-01-03 NOTE — PROGRESS NOTES
[facility]   Infusion Clinic Note   Date: January 3, 2025   Name: Camelia Jones  : 1934   MRN: [id number]          Reason for Visit: OP Infusion (Retacrit 20,000 units every 14 days)         Today: We administered epoetin willian-epbx.       Visit Type: INJECTION       Ordered By: Dr. Bernard       Accompanied by:Child/Children       Diagnosis: CKD (chronic kidney disease) stage 4, GFR 15-29 ml/min (Multi)        Allergies:   Allergies as of 2025 - Reviewed 2025   Allergen Reaction Noted    Azithromycin Nausea Only 2023    Ciprofloxacin Nausea Only 2023    Codeine Nausea Only 2023    Macrolide antibiotics Nausea Only 2023    Penicillins Unknown 2023    Sulfa (sulfonamide antibiotics) Nausea Only 2023          Current Medications has a current medication list which includes the following prescription(s): amlodipine, aspirin, biotin, calcium carbonate, carvedilol, cetirizine, cyanocobalamin, dextromethorphan-guaifenesin, docusate sodium, ferrous sulfate (325 mg ferrous sulfate), b complex-vitamin c-folic acid, gabapentin, krill oil, magnesium hydroxide, omega-3 acid ethyl esters, polyethylene glycol, pravastatin, sennosides, sodium bicarbonate, torsemide, and zinc gluconate.       Vitals:   Vitals:    25 1007   BP: 154/72  Comment: manual   Pulse: 66   Resp: 18   Temp: 36.8 °C (98.3 °F)   TempSrc: Temporal   SpO2: 98%   PainSc: 0-No pain             Infusion Pre-procedure Checklist:   - Allergies reviewed: yes   - Medications reviewed: yes       - Previous reaction to current treatment: no      Assess patient for the concerns below. Document provider notification as appropriate.  - Active or recent infection with/without current antibiotic use: no  - Recent or planned invasive dental work: no  - Recent or planned surgeries: no  - Recently received or plans to receive vaccinations: no  - Has treatment related toxicities: no  - Is  "pregnant:  n/a      Pain: 0   - Is the pain different from normal: no   - Is your Doctor aware:  n/a       Labs:  POCT Hgb today is  10.2 today          Fall Risk Screening: Knight Fall Risk  History of Falling, Immediate or Within 3 Months: No  Secondary Diagnosis: Yes  Ambulatory Aid: Crutches/cane/walker  Intravenous Therapy/Heparin Lock: No  Gait/Transferring: Normal/bedrest/immobile  Mental Status: Oriented to own ability  Knight Fall Risk Score: 30       Review Of Systems:  Review of Systems   Respiratory:  Negative for cough, shortness of breath and wheezing.    Cardiovascular:  Positive for leg swelling. Negative for chest pain and palpitations.   Skin:  Negative for itching, rash and wound.   Neurological:  Negative for dizziness, extremity weakness, light-headedness and numbness.         ROS completed? Yes      Infusion Readiness:  - Assessment Concerns Related to Infusion: No  - Provider notified: n/a      Document Below Only If Indicated:   New Patient Education:    N/A (returning patient for continuation of therapy. Ongoing education provided as needed.)        Treatment Conditions & Drug Specific Questions:    Epoetin Ángel (EPOGEN. PROCRIT, RETACRIT)    TREATMENT CONDITIONS:    Unless otherwise specified on patient specific therapy plan HOLD and notify provider prior to proceeding with today's injection if:  o Hemoglobin GREATER THAN 11 g/dL (parameter set by prescribing provider)  o Increase in hemoglobin GREATER THAN 1 g/dL   o SBP GREATER THAN 160 mmHg    Lab Results   Component Value Date/Time    HGB 10.2 (A) 01/03/2025 1019    HGB 8.8 (A) 12/20/2024 1121    HGB 9.2 (A) 12/06/2024 1100     No results found for: \"HGBCAP\"     Labs reviewed and patient meets treatment conditions? Yes    DRUG SPECIFIC QUESTIONS:  - Does the patient have uncontrolled hypertension?  No    (Use is contraindicated in patients with uncontrolled hypertension)     - Educate on the following:? Yes       - Increased risk of " serious cardiovascular reactions, thromboembolic reactions, stroke and tumor progression.        - Need to undergo regular blood pressure monitoring. Adhere to prescribed anti-hypertensive regimen and follow recommended dietary restrictions.        - Need to contact their healthcare provider for new onset neurologic symptoms or change in seizure frequency.        - Need for  regular laboratory tests to monitor hemoglobin levels.          Weight Based Drug Calculations:    WEIGHT BASED DRUGS: NOT APPLICABLE / FLAT DOSE          Initiated By: Luiz Callaway RN  _________________________________________________________________________    _________________________________________________________________________    Note authored and patient cared for by: Luiz Callaway RN  Note/Encounter reviewed by: Rasheeda GRAFF NP. This provider on site at time of patient injection. Staff to notify this provider of any questions, concerns, abnormals or issues during encounter. No issues reported. Pt. tolerated without difficulty. Pt. not independently evaluated by this provider during today's encounter

## 2025-01-08 NOTE — PROGRESS NOTES
Resident seen 25 -- MP    CC: LTC ([facility]) Recheck    : 1934  SNF H&P done 23, 23 (EPIC)  Allergy: Azithro, Codeine, PCN, Cipro, Sulfa, Corn  DNR-CCA, NO HD    S: 89 yo woman with HLD, CKD4, HTN, OA anemia and hyponatremia. C/O b/l knee pain. Med List & Problem list reviewed. No cp/sob.    O: VSS AFEB 131# (up 5#) A&O NAD. Chest cta. heart rrr,  BRIDGET. Ext left LE Edema 3+ > Right 2+ wearing light weight compression stockings.    LAB (25) PENDING Iron, Ferritin, CBC, and CMP  (24) Ferritin 844, Iron 39, TIBC 210  (24) Hgb 9.4->8.7,  (10/31/24) Cr 2.81, GFR 15, K 4.2, Na 142, TSH 3.93, Alb 4.1, CO2 17->24  (10/23/24) Alb 3.4, ->6152->560  (2024) , HDL 52, LDL 71  (23) Ferritin 1165, Iron 25->31, TIBC 190  (23) Urine Electrophoresis -- No M Protein. Urine osmolality 513.    CXR (6/10/24) Clear (no infiltrate, no CHF)    24 PROCEDURE: verbal consent obtained. B/L knee lateral joint aspiration & injection of 3 cc 2% lidocaine+ kenalog 60. Pt tolerated well.    A/P:  # Muscle weakness and gait instability: Largely Wheelchair bound. Walks with walker at times.  # Left>Right knee OA: improved with topical blue emu and CSI 24 -- plan CSI 25.  # Hx Hyponatremia: resolved OFF salt tablet. Monitor levels. Continue FR to 1800 cc 24.  # CHF/Edema: stable on torsemide 20 mg galvez.  # Lumbar spondylosis and hx L5 & Sacral Ala fx: pain control inadequate with tylenol. Pt refuses oxycodone and tramadol. Completed SNF Rehab. LTC. F/U with ortho. Lidoderm patch.  # Depression and Chronic pain: patient declines offer of cymbalta 30 mg qam.  # B/L heel pain c/w PF -- night splints.  # HTN: stable -- cut norvasc to 5, Coreg 12.5 bid.  # HLD: statin, ASA  # Anemia: persistent despite B12 replenishment. Getting Iron infusions and EPO per Nephro -- next appt 1/3/25.  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 F/U Nephro [place] (seen 24). NO HD per patients expressed  advance directives. Stable on NaHCO3 for metabolic acidosis.  # Insomnia: Patient declines Ambien, melatonin and tramadol. Offer herbal tea, capsaicin cream.  # Hx Constipation: OFF tums, Ca, & Fe. Continue colace & miralax.

## 2025-01-09 ENCOUNTER — NURSING HOME VISIT (OUTPATIENT)
Dept: POST ACUTE CARE | Facility: EXTERNAL LOCATION | Age: OVER 89
End: 2025-01-09
Payer: COMMERCIAL

## 2025-01-09 DIAGNOSIS — M17.11 PRIMARY OSTEOARTHRITIS OF RIGHT KNEE: ICD-10-CM

## 2025-01-09 DIAGNOSIS — M17.12 PRIMARY OSTEOARTHRITIS OF LEFT KNEE: ICD-10-CM

## 2025-01-09 PROCEDURE — 20610 DRAIN/INJ JOINT/BURSA W/O US: CPT | Performed by: FAMILY MEDICINE

## 2025-01-09 NOTE — PROGRESS NOTES
1/9/25 Procedure note: Verbal consent obtained.  B/L Knee aspiration and injection with 40 mg kenalog and 4 cc 2% lidocaine. Patient tolerated well. Repeat no sooner than 4/9/25.    Patient denies depression -- will docuement in next progress note for February E&M. ARNIE

## 2025-01-09 NOTE — LETTER
Patient: Camelia Jones  : 1934    Encounter Date: 2025 Procedure note: Verbal consent obtained.  B/L Knee aspiration and injection with 40 mg kenalog and 4 cc 2% lidocaine. Patient tolerated well. Repeat no sooner than 25.    Patient denies depression -- will docuement in next progress note for February E&. MP    Electronically Signed By: Trav Rodriguez MD   25  4:57 PM

## 2025-01-13 ENCOUNTER — APPOINTMENT (OUTPATIENT)
Dept: INFUSION THERAPY | Facility: CLINIC | Age: OVER 89
End: 2025-01-13
Payer: COMMERCIAL

## 2025-01-16 ENCOUNTER — APPOINTMENT (OUTPATIENT)
Dept: INFUSION THERAPY | Facility: CLINIC | Age: OVER 89
End: 2025-01-16
Payer: COMMERCIAL

## 2025-01-23 ENCOUNTER — APPOINTMENT (OUTPATIENT)
Dept: INFUSION THERAPY | Facility: CLINIC | Age: OVER 89
End: 2025-01-23
Payer: COMMERCIAL

## 2025-01-24 ENCOUNTER — NURSING HOME VISIT (OUTPATIENT)
Dept: POST ACUTE CARE | Facility: EXTERNAL LOCATION | Age: OVER 89
End: 2025-01-24
Payer: COMMERCIAL

## 2025-01-24 DIAGNOSIS — S32.059D CLOSED FRACTURE OF FIFTH LUMBAR VERTEBRA WITH ROUTINE HEALING, UNSPECIFIED FRACTURE MORPHOLOGY, SUBSEQUENT ENCOUNTER: ICD-10-CM

## 2025-01-24 DIAGNOSIS — K59.00 CONSTIPATION, UNSPECIFIED CONSTIPATION TYPE: ICD-10-CM

## 2025-01-24 DIAGNOSIS — R13.10 DYSPHAGIA, UNSPECIFIED TYPE: ICD-10-CM

## 2025-01-24 DIAGNOSIS — R11.0 NAUSEA: ICD-10-CM

## 2025-01-24 DIAGNOSIS — J18.9 HCAP (HEALTHCARE-ASSOCIATED PNEUMONIA): Primary | ICD-10-CM

## 2025-01-24 DIAGNOSIS — I10 HYPERTENSION, UNSPECIFIED TYPE: ICD-10-CM

## 2025-01-24 DIAGNOSIS — D64.9 ANEMIA, UNSPECIFIED TYPE: ICD-10-CM

## 2025-01-24 DIAGNOSIS — R60.9 EDEMA, UNSPECIFIED TYPE: ICD-10-CM

## 2025-01-24 DIAGNOSIS — E78.5 DYSLIPIDEMIA: ICD-10-CM

## 2025-01-24 DIAGNOSIS — N18.5 CHRONIC KIDNEY DISEASE, STAGE V (MULTI): ICD-10-CM

## 2025-01-24 DIAGNOSIS — S32.10XD CLOSED FRACTURE OF SACRUM AND COCCYX WITH ROUTINE HEALING, SUBSEQUENT ENCOUNTER: ICD-10-CM

## 2025-01-24 DIAGNOSIS — L21.9 SEBORRHEIC DERMATITIS: ICD-10-CM

## 2025-01-24 DIAGNOSIS — Z29.89 NEED FOR PROPHYLAXIS AGAINST URINARY TRACT INFECTION: ICD-10-CM

## 2025-01-24 DIAGNOSIS — E56.9 VITAMIN DEFICIENCY: ICD-10-CM

## 2025-01-24 DIAGNOSIS — S32.2XXD CLOSED FRACTURE OF SACRUM AND COCCYX WITH ROUTINE HEALING, SUBSEQUENT ENCOUNTER: ICD-10-CM

## 2025-01-24 PROCEDURE — 99309 SBSQ NF CARE MODERATE MDM 30: CPT | Performed by: NURSE PRACTITIONER

## 2025-01-27 NOTE — PROGRESS NOTES
*Provider Impression*    Patient is a 89 year old female who is seen today for management of multiple medical problems       #HCAP - doxycycline 100mg BID until 1/31, albuterol q8h PRN, tessalon 100mg q8h PRN  #CKD / Edema / HTN / HLD - fish oil 1000mg daily, pravastatin 20mg daily, ASA 81mg daily, carvedilol 12.5mg BID, f/u w/ Dr. Bernard, torsemide 10mg daily, NaCl 1gram BID, renal support drink daily, fluid restriction 1800mL daily, sodium bicarb 650mg BID, amlodipine 5mg daily  #UTI prophylaxis - cranberry 600mg daily  #Constipation / Nausea / Dysphagia - consult SLP, zofran 4mg q6h PRN, miralax 17grams BID, senna 8.6mg BID, Tums 1000mg q6h PRN  #Seborrheic dermatitis - ketoconazole shampoo  #Fracture of L5 vertebra & L sacral ala / OA - acetaminophen 500mg q4h PRN, lidocaine patch daily, tramadol 50mg BID, turmeric 1000mg BID, BlueEmu BID and BID PRN, f/u w/ Jaclyn Hicks  #Anemia / Vitamin deficiency - vitamin E 400units daily, zinc 50mg daily, calcium 250mg daily, vitamin B12 1mg weekly, biotin 1mg daily, ferrous sulfate 325mg BID  #Allergic rhinitis - zyrtec 10mg daily, saline gel BID  #Insomnia - melatonin 3mg QHS PRN  #ACP - DNRCC-A  Follow up as needed      *Chief Complaint*     edema / PNA    *History of Present Illness*    Patient is a 89 y/o female LTC resident [facility] w/ PMH as below who is seen today for f/u and management of multiple medical problems.     Her labs appreciated as below w/ worsening renal function. I decreased torsemide, messaged Dr. Bernard who was in agreement based on results but cautioned against reducing the amlodipine despite peripheral edema. She had developed URI w/ initial CXR non acute. Repeat labs today w/ WBC up, so ordered CXR which showed development of infiltrate, UA sent, started on doxycycline.    She is seen sitting up in her room today and reports still coughing, no SOB, f/c, sweats, CP, n/v, dysuria, or any other new c/o presently.     Yoandys -  Azithromycin, Codeine, Penicillin, Cipro, Sulfa Antibiotics, Corn   PMH - hypertension, hyperlipidemia, anemia, CKD, DVT, anemia, OA, PVD, CAD,   PSH -  hip surgery, appendectomy, marsupialization of ovarian cyst, HOMERO  FH - Brother had A-fib  SocHx - Never smoker, No EtOH    *Review of Systems*  All other systems reviewed are negative except as noted in the HPI     *Vital Signs*   Date: 1/17/25  - T: 97.4  P: 66  R: 18  BP: 134/70 SpO2: 97% on RA ; Date: 1/24/25 - Wt: 129      *Results / Data*  CBC - Date: 1/24/25  WBC: 18.14  HGB: 11.9  HCT: 40.0  PLT: 190  ;   BMP - Date: 1/24/25  Na: 147  K: 5.0  Cl: 113  CO2: 19  BUN: 83  Cr: 3.44  Glu: 120  Ca: 8.4  ;   LFT - Date: 1/24/25  AST: 34  ALT: 16  ALP: 83  Tbili: 0.3  ;   Coags - Date:   INR:   PT:  Other - Date: 9/14/23  Iron: 34  TIBC: 186  B12: >2000  Folate: >20.0  Ferritin: >2000  Retic: 2.5%; 10/2/23  Iron: 25  TIBC: 190  Ferritin: 1455  B12: >2000; 11/8/23  Ferritin: 1165  Iron: 31 ; 8/14/24  BNP: 335; 10/23/24  BNP: 560 ; 1/24/25  TSH: 1.350    *Physical Exam*  Gen: (+) NAD, (+) well-appearing  HEENT: (+) normocephalic, (+) MMM  Neck: (+) supple  Lungs: (+) CTAB, (-) wheezes, (-) rales, (-) rhonchi  Heart: (+) RRR, (+) S1 S2, (+) 1/6 BRIDGET  Pulses: (+) palpable  Abd: (+) soft, (+) NT, (+) ND, (+) BS+  Ext: (-) edema, (-) deformity  MSK: (-) joint swelling  Skin: (+) warm, (+) dry, (-) rash  Neuro: (+) follows commands, (-) tremor, (+) alert

## 2025-01-30 ENCOUNTER — APPOINTMENT (OUTPATIENT)
Dept: INFUSION THERAPY | Facility: CLINIC | Age: OVER 89
End: 2025-01-30
Payer: COMMERCIAL

## 2025-02-02 ENCOUNTER — HOSPITAL ENCOUNTER (INPATIENT)
Facility: HOSPITAL | Age: OVER 89
End: 2025-02-02
Attending: EMERGENCY MEDICINE | Admitting: INTERNAL MEDICINE
Payer: COMMERCIAL

## 2025-02-02 ENCOUNTER — APPOINTMENT (OUTPATIENT)
Dept: RADIOLOGY | Facility: HOSPITAL | Age: OVER 89
End: 2025-02-02
Payer: COMMERCIAL

## 2025-02-02 ENCOUNTER — APPOINTMENT (OUTPATIENT)
Dept: CARDIOLOGY | Facility: HOSPITAL | Age: OVER 89
End: 2025-02-02
Payer: COMMERCIAL

## 2025-02-02 VITALS
DIASTOLIC BLOOD PRESSURE: 96 MMHG | WEIGHT: 129.19 LBS | BODY MASS INDEX: 23.77 KG/M2 | TEMPERATURE: 98.2 F | SYSTOLIC BLOOD PRESSURE: 152 MMHG | RESPIRATION RATE: 18 BRPM | HEIGHT: 62 IN | HEART RATE: 79 BPM | OXYGEN SATURATION: 92 %

## 2025-02-02 DIAGNOSIS — J90 PLEURAL EFFUSION: ICD-10-CM

## 2025-02-02 DIAGNOSIS — E53.8 VITAMIN B 12 DEFICIENCY: ICD-10-CM

## 2025-02-02 DIAGNOSIS — I48.0 PAROXYSMAL A-FIB (MULTI): ICD-10-CM

## 2025-02-02 DIAGNOSIS — G89.29 OTHER CHRONIC PAIN: ICD-10-CM

## 2025-02-02 DIAGNOSIS — E87.0 HYPERNATREMIA: Primary | ICD-10-CM

## 2025-02-02 DIAGNOSIS — I48.91 ATRIAL FIBRILLATION WITH RAPID VENTRICULAR RESPONSE (MULTI): ICD-10-CM

## 2025-02-02 LAB
ALBUMIN SERPL BCP-MCNC: 3.5 G/DL (ref 3.4–5)
ALP SERPL-CCNC: 74 U/L (ref 33–136)
ALT SERPL W P-5'-P-CCNC: 11 U/L (ref 7–45)
AMORPH CRY #/AREA UR COMP ASSIST: ABNORMAL /HPF
ANION GAP BLDV CALCULATED.4IONS-SCNC: 10 MMOL/L (ref 10–25)
ANION GAP SERPL CALC-SCNC: 16 MMOL/L (ref 10–20)
ANION GAP SERPL CALC-SCNC: 17 MMOL/L (ref 10–20)
APPEARANCE UR: CLEAR
AST SERPL W P-5'-P-CCNC: 19 U/L (ref 9–39)
BACTERIA #/AREA URNS AUTO: ABNORMAL /HPF
BASE EXCESS BLDV CALC-SCNC: -0.5 MMOL/L (ref -2–3)
BASOPHILS # BLD AUTO: 0.06 X10*3/UL (ref 0–0.1)
BASOPHILS NFR BLD AUTO: 0.7 %
BILIRUB SERPL-MCNC: 0.3 MG/DL (ref 0–1.2)
BILIRUB UR STRIP.AUTO-MCNC: NEGATIVE MG/DL
BNP SERPL-MCNC: 861 PG/ML (ref 0–99)
BODY TEMPERATURE: ABNORMAL
BUN SERPL-MCNC: 76 MG/DL (ref 6–23)
BUN SERPL-MCNC: 78 MG/DL (ref 6–23)
CA-I BLDV-SCNC: 1.05 MMOL/L (ref 1.1–1.33)
CALCIUM SERPL-MCNC: 7.6 MG/DL (ref 8.6–10.3)
CALCIUM SERPL-MCNC: 8.1 MG/DL (ref 8.6–10.3)
CARDIAC TROPONIN I PNL SERPL HS: 42 NG/L (ref 0–13)
CHLORIDE BLDV-SCNC: 115 MMOL/L (ref 98–107)
CHLORIDE SERPL-SCNC: 114 MMOL/L (ref 98–107)
CHLORIDE SERPL-SCNC: 116 MMOL/L (ref 98–107)
CO2 SERPL-SCNC: 20 MMOL/L (ref 21–32)
CO2 SERPL-SCNC: 25 MMOL/L (ref 21–32)
COLOR UR: ABNORMAL
CREAT SERPL-MCNC: 3.63 MG/DL (ref 0.5–1.05)
CREAT SERPL-MCNC: 3.71 MG/DL (ref 0.5–1.05)
EGFRCR SERPLBLD CKD-EPI 2021: 11 ML/MIN/1.73M*2
EGFRCR SERPLBLD CKD-EPI 2021: 11 ML/MIN/1.73M*2
EOSINOPHIL # BLD AUTO: 0.12 X10*3/UL (ref 0–0.4)
EOSINOPHIL NFR BLD AUTO: 1.4 %
ERYTHROCYTE [DISTWIDTH] IN BLOOD BY AUTOMATED COUNT: 15.5 % (ref 11.5–14.5)
FLUAV RNA RESP QL NAA+PROBE: NOT DETECTED
FLUBV RNA RESP QL NAA+PROBE: NOT DETECTED
GLUCOSE BLDV-MCNC: 115 MG/DL (ref 74–99)
GLUCOSE SERPL-MCNC: 109 MG/DL (ref 74–99)
GLUCOSE SERPL-MCNC: 97 MG/DL (ref 74–99)
GLUCOSE UR STRIP.AUTO-MCNC: NORMAL MG/DL
HCO3 BLDV-SCNC: 24.2 MMOL/L (ref 22–26)
HCT VFR BLD AUTO: 40.3 % (ref 36–46)
HCT VFR BLD EST: 42 % (ref 36–46)
HGB BLD-MCNC: 12.5 G/DL (ref 12–16)
HGB BLDV-MCNC: 14 G/DL (ref 12–16)
HYALINE CASTS #/AREA URNS AUTO: ABNORMAL /LPF
IMM GRANULOCYTES # BLD AUTO: 0.05 X10*3/UL (ref 0–0.5)
IMM GRANULOCYTES NFR BLD AUTO: 0.6 % (ref 0–0.9)
INHALED O2 CONCENTRATION: 21 %
INR PPP: 1.3 (ref 0.9–1.1)
KETONES UR STRIP.AUTO-MCNC: NEGATIVE MG/DL
LACTATE BLDV-SCNC: 1.1 MMOL/L (ref 0.4–2)
LEUKOCYTE ESTERASE UR QL STRIP.AUTO: ABNORMAL
LYMPHOCYTES # BLD AUTO: 0.85 X10*3/UL (ref 0.8–3)
LYMPHOCYTES NFR BLD AUTO: 9.9 %
MAGNESIUM SERPL-MCNC: 2.24 MG/DL (ref 1.6–2.4)
MCH RBC QN AUTO: 31.3 PG (ref 26–34)
MCHC RBC AUTO-ENTMCNC: 31 G/DL (ref 32–36)
MCV RBC AUTO: 101 FL (ref 80–100)
MONOCYTES # BLD AUTO: 0.47 X10*3/UL (ref 0.05–0.8)
MONOCYTES NFR BLD AUTO: 5.5 %
MRSA DNA SPEC QL NAA+PROBE: DETECTED
NEUTROPHILS # BLD AUTO: 7.06 X10*3/UL (ref 1.6–5.5)
NEUTROPHILS NFR BLD AUTO: 81.9 %
NITRITE UR QL STRIP.AUTO: NEGATIVE
NRBC BLD-RTO: 0 /100 WBCS (ref 0–0)
OXYHGB MFR BLDV: 89.3 % (ref 45–75)
PCO2 BLDV: 39 MM HG (ref 41–51)
PH BLDV: 7.4 PH (ref 7.33–7.43)
PH UR STRIP.AUTO: 5.5 [PH]
PLATELET # BLD AUTO: 285 X10*3/UL (ref 150–450)
PO2 BLDV: 58 MM HG (ref 35–45)
POTASSIUM BLDV-SCNC: 4.4 MMOL/L (ref 3.5–5.3)
POTASSIUM SERPL-SCNC: 4.3 MMOL/L (ref 3.5–5.3)
POTASSIUM SERPL-SCNC: 4.4 MMOL/L (ref 3.5–5.3)
PROT SERPL-MCNC: 6.7 G/DL (ref 6.4–8.2)
PROT UR STRIP.AUTO-MCNC: ABNORMAL MG/DL
PROTHROMBIN TIME: 14.9 SECONDS (ref 9.8–12.8)
RBC # BLD AUTO: 4 X10*6/UL (ref 4–5.2)
RBC # UR STRIP.AUTO: ABNORMAL /UL
RBC #/AREA URNS AUTO: ABNORMAL /HPF
RSV RNA RESP QL NAA+PROBE: NOT DETECTED
SAO2 % BLDV: 92 % (ref 45–75)
SARS-COV-2 RNA RESP QL NAA+PROBE: NOT DETECTED
SODIUM BLDV-SCNC: 145 MMOL/L (ref 136–145)
SODIUM SERPL-SCNC: 149 MMOL/L (ref 136–145)
SODIUM SERPL-SCNC: 151 MMOL/L (ref 136–145)
SP GR UR STRIP.AUTO: 1.02
SQUAMOUS #/AREA URNS AUTO: ABNORMAL /HPF
TRANS CELLS #/AREA UR COMP ASSIST: ABNORMAL /HPF
TSH SERPL-ACNC: 1.4 MIU/L (ref 0.44–3.98)
UROBILINOGEN UR STRIP.AUTO-MCNC: NORMAL MG/DL
WBC # BLD AUTO: 8.6 X10*3/UL (ref 4.4–11.3)
WBC #/AREA URNS AUTO: ABNORMAL /HPF
WBC CLUMPS #/AREA URNS AUTO: ABNORMAL /HPF

## 2025-02-02 PROCEDURE — 84443 ASSAY THYROID STIM HORMONE: CPT | Performed by: PHYSICIAN ASSISTANT

## 2025-02-02 PROCEDURE — 96360 HYDRATION IV INFUSION INIT: CPT

## 2025-02-02 PROCEDURE — 84132 ASSAY OF SERUM POTASSIUM: CPT | Performed by: PHYSICIAN ASSISTANT

## 2025-02-02 PROCEDURE — 85610 PROTHROMBIN TIME: CPT | Performed by: PHYSICIAN ASSISTANT

## 2025-02-02 PROCEDURE — 2500000004 HC RX 250 GENERAL PHARMACY W/ HCPCS (ALT 636 FOR OP/ED): Performed by: INTERNAL MEDICINE

## 2025-02-02 PROCEDURE — 2500000001 HC RX 250 WO HCPCS SELF ADMINISTERED DRUGS (ALT 637 FOR MEDICARE OP): Performed by: INTERNAL MEDICINE

## 2025-02-02 PROCEDURE — 70450 CT HEAD/BRAIN W/O DYE: CPT

## 2025-02-02 PROCEDURE — 93005 ELECTROCARDIOGRAM TRACING: CPT

## 2025-02-02 PROCEDURE — 99223 1ST HOSP IP/OBS HIGH 75: CPT | Performed by: INTERNAL MEDICINE

## 2025-02-02 PROCEDURE — 83880 ASSAY OF NATRIURETIC PEPTIDE: CPT | Performed by: PHYSICIAN ASSISTANT

## 2025-02-02 PROCEDURE — 83735 ASSAY OF MAGNESIUM: CPT | Performed by: PHYSICIAN ASSISTANT

## 2025-02-02 PROCEDURE — 85025 COMPLETE CBC W/AUTO DIFF WBC: CPT | Performed by: PHYSICIAN ASSISTANT

## 2025-02-02 PROCEDURE — 84484 ASSAY OF TROPONIN QUANT: CPT | Performed by: PHYSICIAN ASSISTANT

## 2025-02-02 PROCEDURE — 86140 C-REACTIVE PROTEIN: CPT | Performed by: INTERNAL MEDICINE

## 2025-02-02 PROCEDURE — 2500000004 HC RX 250 GENERAL PHARMACY W/ HCPCS (ALT 636 FOR OP/ED): Performed by: PHYSICIAN ASSISTANT

## 2025-02-02 PROCEDURE — 74176 CT ABD & PELVIS W/O CONTRAST: CPT

## 2025-02-02 PROCEDURE — 87086 URINE CULTURE/COLONY COUNT: CPT | Mod: GEALAB | Performed by: PHYSICIAN ASSISTANT

## 2025-02-02 PROCEDURE — 87641 MR-STAPH DNA AMP PROBE: CPT | Performed by: INTERNAL MEDICINE

## 2025-02-02 PROCEDURE — 99285 EMERGENCY DEPT VISIT HI MDM: CPT | Mod: 25 | Performed by: EMERGENCY MEDICINE

## 2025-02-02 PROCEDURE — 74176 CT ABD & PELVIS W/O CONTRAST: CPT | Performed by: STUDENT IN AN ORGANIZED HEALTH CARE EDUCATION/TRAINING PROGRAM

## 2025-02-02 PROCEDURE — 71250 CT THORAX DX C-: CPT | Performed by: STUDENT IN AN ORGANIZED HEALTH CARE EDUCATION/TRAINING PROGRAM

## 2025-02-02 PROCEDURE — 81001 URINALYSIS AUTO W/SCOPE: CPT | Performed by: PHYSICIAN ASSISTANT

## 2025-02-02 PROCEDURE — 36415 COLL VENOUS BLD VENIPUNCTURE: CPT | Performed by: PHYSICIAN ASSISTANT

## 2025-02-02 PROCEDURE — 71046 X-RAY EXAM CHEST 2 VIEWS: CPT

## 2025-02-02 PROCEDURE — 70450 CT HEAD/BRAIN W/O DYE: CPT | Performed by: STUDENT IN AN ORGANIZED HEALTH CARE EDUCATION/TRAINING PROGRAM

## 2025-02-02 PROCEDURE — 87637 SARSCOV2&INF A&B&RSV AMP PRB: CPT | Performed by: PHYSICIAN ASSISTANT

## 2025-02-02 PROCEDURE — 1200000002 HC GENERAL ROOM WITH TELEMETRY DAILY

## 2025-02-02 PROCEDURE — 87449 NOS EACH ORGANISM AG IA: CPT | Mod: GEALAB | Performed by: INTERNAL MEDICINE

## 2025-02-02 PROCEDURE — 87899 AGENT NOS ASSAY W/OPTIC: CPT | Mod: GEALAB | Performed by: INTERNAL MEDICINE

## 2025-02-02 PROCEDURE — 71046 X-RAY EXAM CHEST 2 VIEWS: CPT | Performed by: RADIOLOGY

## 2025-02-02 RX ORDER — ONDANSETRON 4 MG/1
4 TABLET, FILM COATED ORAL EVERY 6 HOURS PRN
COMMUNITY

## 2025-02-02 RX ORDER — SODIUM BICARBONATE 650 MG/1
650 TABLET ORAL 2 TIMES DAILY
Status: DISCONTINUED | OUTPATIENT
Start: 2025-02-02 | End: 2025-02-14 | Stop reason: HOSPADM

## 2025-02-02 RX ORDER — FERROUS SULFATE 325(65) MG
65 TABLET ORAL DAILY
Status: DISCONTINUED | OUTPATIENT
Start: 2025-02-03 | End: 2025-02-14 | Stop reason: HOSPADM

## 2025-02-02 RX ORDER — PRAVASTATIN SODIUM 40 MG/1
20 TABLET ORAL
Status: DISCONTINUED | OUTPATIENT
Start: 2025-02-02 | End: 2025-02-14 | Stop reason: HOSPADM

## 2025-02-02 RX ORDER — LIDOCAINE 50 MG/G
1 PATCH TOPICAL DAILY
COMMUNITY

## 2025-02-02 RX ORDER — PROCHLORPERAZINE EDISYLATE 5 MG/ML
10 INJECTION INTRAMUSCULAR; INTRAVENOUS EVERY 6 HOURS PRN
Status: DISCONTINUED | OUTPATIENT
Start: 2025-02-02 | End: 2025-02-14 | Stop reason: HOSPADM

## 2025-02-02 RX ORDER — DEXTROSE MONOHYDRATE AND SODIUM CHLORIDE 5; .45 G/100ML; G/100ML
50 INJECTION, SOLUTION INTRAVENOUS CONTINUOUS
Status: DISCONTINUED | OUTPATIENT
Start: 2025-02-02 | End: 2025-02-03

## 2025-02-02 RX ORDER — BUDESONIDE 0.25 MG/2ML
0.25 INHALANT ORAL
Status: DISCONTINUED | OUTPATIENT
Start: 2025-02-02 | End: 2025-02-02

## 2025-02-02 RX ORDER — TORSEMIDE 20 MG/1
10 TABLET ORAL DAILY
Status: DISCONTINUED | OUTPATIENT
Start: 2025-02-03 | End: 2025-02-14 | Stop reason: HOSPADM

## 2025-02-02 RX ORDER — POLYETHYLENE GLYCOL 3350 17 G/17G
17 POWDER, FOR SOLUTION ORAL 2 TIMES DAILY
Status: DISCONTINUED | OUTPATIENT
Start: 2025-02-02 | End: 2025-02-14 | Stop reason: HOSPADM

## 2025-02-02 RX ORDER — IPRATROPIUM BROMIDE AND ALBUTEROL SULFATE 2.5; .5 MG/3ML; MG/3ML
3 SOLUTION RESPIRATORY (INHALATION) EVERY 4 HOURS PRN
Status: DISCONTINUED | OUTPATIENT
Start: 2025-02-02 | End: 2025-02-02

## 2025-02-02 RX ORDER — CETIRIZINE HYDROCHLORIDE 10 MG/1
5 TABLET ORAL DAILY
Status: DISCONTINUED | OUTPATIENT
Start: 2025-02-03 | End: 2025-02-14 | Stop reason: HOSPADM

## 2025-02-02 RX ORDER — HEPARIN SODIUM 5000 [USP'U]/ML
5000 INJECTION, SOLUTION INTRAVENOUS; SUBCUTANEOUS EVERY 8 HOURS SCHEDULED
Status: DISCONTINUED | OUTPATIENT
Start: 2025-02-02 | End: 2025-02-13

## 2025-02-02 RX ORDER — BENZONATATE 100 MG/1
100 CAPSULE ORAL 3 TIMES DAILY
COMMUNITY

## 2025-02-02 RX ORDER — ACETAMINOPHEN 500 MG
5 TABLET ORAL NIGHTLY
Status: DISCONTINUED | OUTPATIENT
Start: 2025-02-02 | End: 2025-02-02

## 2025-02-02 RX ORDER — FORMOTEROL FUMARATE 20 UG/2ML
20 SOLUTION RESPIRATORY (INHALATION)
Status: DISCONTINUED | OUTPATIENT
Start: 2025-02-02 | End: 2025-02-02

## 2025-02-02 RX ORDER — ACETAMINOPHEN 325 MG/1
975 TABLET ORAL EVERY 8 HOURS
Status: DISCONTINUED | OUTPATIENT
Start: 2025-02-02 | End: 2025-02-14 | Stop reason: HOSPADM

## 2025-02-02 RX ORDER — AMOXICILLIN 250 MG
2 CAPSULE ORAL 2 TIMES DAILY
Status: DISCONTINUED | OUTPATIENT
Start: 2025-02-02 | End: 2025-02-14 | Stop reason: HOSPADM

## 2025-02-02 RX ORDER — FLUTICASONE FUROATE AND VILANTEROL 100; 25 UG/1; UG/1
1 POWDER RESPIRATORY (INHALATION)
Status: DISCONTINUED | OUTPATIENT
Start: 2025-02-03 | End: 2025-02-02

## 2025-02-02 RX ORDER — GUAIFENESIN/DEXTROMETHORPHAN 100-10MG/5
5 SYRUP ORAL EVERY 4 HOURS PRN
Status: DISCONTINUED | OUTPATIENT
Start: 2025-02-02 | End: 2025-02-14 | Stop reason: HOSPADM

## 2025-02-02 RX ORDER — B-COMPLEX WITH VITAMIN C
1 TABLET ORAL DAILY
Status: DISCONTINUED | OUTPATIENT
Start: 2025-02-03 | End: 2025-02-14 | Stop reason: HOSPADM

## 2025-02-02 RX ORDER — ACETAMINOPHEN 500 MG
500 TABLET ORAL 2 TIMES DAILY
Status: ON HOLD | COMMUNITY
End: 2025-02-14

## 2025-02-02 RX ORDER — AMLODIPINE BESYLATE 5 MG/1
5 TABLET ORAL
Status: DISCONTINUED | OUTPATIENT
Start: 2025-02-03 | End: 2025-02-14 | Stop reason: HOSPADM

## 2025-02-02 RX ORDER — ERTAPENEM 1 G/1
1 INJECTION, POWDER, LYOPHILIZED, FOR SOLUTION INTRAMUSCULAR; INTRAVENOUS DAILY
COMMUNITY
End: 2025-02-14 | Stop reason: HOSPADM

## 2025-02-02 RX ORDER — IPRATROPIUM BROMIDE AND ALBUTEROL SULFATE 2.5; .5 MG/3ML; MG/3ML
3 SOLUTION RESPIRATORY (INHALATION) EVERY 2 HOUR PRN
Status: DISCONTINUED | OUTPATIENT
Start: 2025-02-02 | End: 2025-02-14 | Stop reason: HOSPADM

## 2025-02-02 RX ORDER — CARVEDILOL 12.5 MG/1
12.5 TABLET ORAL 2 TIMES DAILY
Status: DISCONTINUED | OUTPATIENT
Start: 2025-02-02 | End: 2025-02-03

## 2025-02-02 RX ORDER — ALBUTEROL SULFATE 0.83 MG/ML
2.5 SOLUTION RESPIRATORY (INHALATION) EVERY 8 HOURS PRN
COMMUNITY

## 2025-02-02 RX ORDER — ONDANSETRON HYDROCHLORIDE 2 MG/ML
4 INJECTION, SOLUTION INTRAVENOUS EVERY 8 HOURS PRN
Status: DISCONTINUED | OUTPATIENT
Start: 2025-02-02 | End: 2025-02-14 | Stop reason: HOSPADM

## 2025-02-02 RX ORDER — GABAPENTIN 100 MG/1
100 CAPSULE ORAL NIGHTLY
Status: DISCONTINUED | OUTPATIENT
Start: 2025-02-02 | End: 2025-02-14 | Stop reason: HOSPADM

## 2025-02-02 RX ORDER — BENZONATATE 100 MG/1
200 CAPSULE ORAL 3 TIMES DAILY
Status: DISCONTINUED | OUTPATIENT
Start: 2025-02-02 | End: 2025-02-14 | Stop reason: HOSPADM

## 2025-02-02 RX ORDER — ASPIRIN 81 MG/1
81 TABLET ORAL DAILY
Status: DISCONTINUED | OUTPATIENT
Start: 2025-02-03 | End: 2025-02-14 | Stop reason: HOSPADM

## 2025-02-02 RX ORDER — VIT C/E/ZN/COPPR/LUTEIN/ZEAXAN 250MG-90MG
400 CAPSULE ORAL DAILY
COMMUNITY

## 2025-02-02 RX ORDER — DEXTROMETHORPHAN HYDROBROMIDE, GUAIFENESIN 5; 100 MG/5ML; MG/5ML
650 LIQUID ORAL DAILY
COMMUNITY
End: 2025-02-14 | Stop reason: HOSPADM

## 2025-02-02 RX ADMIN — POLYETHYLENE GLYCOL 3350 17 G: 17 POWDER, FOR SOLUTION ORAL at 21:49

## 2025-02-02 RX ADMIN — SENNOSIDES AND DOCUSATE SODIUM 2 TABLET: 50; 8.6 TABLET ORAL at 21:49

## 2025-02-02 RX ADMIN — GABAPENTIN 100 MG: 100 CAPSULE ORAL at 21:48

## 2025-02-02 RX ADMIN — SODIUM CHLORIDE 500 ML: 9 INJECTION, SOLUTION INTRAVENOUS at 15:49

## 2025-02-02 RX ADMIN — PRAVASTATIN SODIUM 20 MG: 40 TABLET ORAL at 21:59

## 2025-02-02 RX ADMIN — HEPARIN SODIUM 5000 UNITS: 5000 INJECTION INTRAVENOUS; SUBCUTANEOUS at 21:59

## 2025-02-02 RX ADMIN — BENZONATATE 200 MG: 100 CAPSULE ORAL at 21:48

## 2025-02-02 RX ADMIN — ERTAPENEM SODIUM 500 MG: 1 INJECTION, POWDER, LYOPHILIZED, FOR SOLUTION INTRAMUSCULAR; INTRAVENOUS at 23:36

## 2025-02-02 RX ADMIN — DEXTROSE AND SODIUM CHLORIDE 50 ML/HR: 5; 450 INJECTION, SOLUTION INTRAVENOUS at 21:59

## 2025-02-02 RX ADMIN — ACETAMINOPHEN 975 MG: 325 TABLET, FILM COATED ORAL at 21:49

## 2025-02-02 RX ADMIN — SODIUM BICARBONATE 650 MG: 650 TABLET ORAL at 22:00

## 2025-02-02 SDOH — SOCIAL STABILITY: SOCIAL INSECURITY: DO YOU FEEL ANYONE HAS EXPLOITED OR TAKEN ADVANTAGE OF YOU FINANCIALLY OR OF YOUR PERSONAL PROPERTY?: NO

## 2025-02-02 SDOH — HEALTH STABILITY: MENTAL HEALTH: HOW OFTEN DO YOU HAVE SIX OR MORE DRINKS ON ONE OCCASION?: NEVER

## 2025-02-02 SDOH — SOCIAL STABILITY: SOCIAL INSECURITY: WERE YOU ABLE TO COMPLETE ALL THE BEHAVIORAL HEALTH SCREENINGS?: YES

## 2025-02-02 SDOH — SOCIAL STABILITY: SOCIAL INSECURITY: ARE YOU OR HAVE YOU BEEN THREATENED OR ABUSED PHYSICALLY, EMOTIONALLY, OR SEXUALLY BY ANYONE?: NO

## 2025-02-02 SDOH — SOCIAL STABILITY: SOCIAL INSECURITY: WITHIN THE LAST YEAR, HAVE YOU BEEN AFRAID OF YOUR PARTNER OR EX-PARTNER?: NO

## 2025-02-02 SDOH — ECONOMIC STABILITY: TRANSPORTATION INSECURITY: IN THE PAST 12 MONTHS, HAS LACK OF TRANSPORTATION KEPT YOU FROM MEDICAL APPOINTMENTS OR FROM GETTING MEDICATIONS?: NO

## 2025-02-02 SDOH — SOCIAL STABILITY: SOCIAL INSECURITY: WITHIN THE LAST YEAR, HAVE YOU BEEN HUMILIATED OR EMOTIONALLY ABUSED IN OTHER WAYS BY YOUR PARTNER OR EX-PARTNER?: NO

## 2025-02-02 SDOH — SOCIAL STABILITY: SOCIAL INSECURITY: ARE THERE ANY APPARENT SIGNS OF INJURIES/BEHAVIORS THAT COULD BE RELATED TO ABUSE/NEGLECT?: NO

## 2025-02-02 SDOH — SOCIAL STABILITY: SOCIAL INSECURITY
WITHIN THE LAST YEAR, HAVE YOU BEEN RAPED OR FORCED TO HAVE ANY KIND OF SEXUAL ACTIVITY BY YOUR PARTNER OR EX-PARTNER?: NO

## 2025-02-02 SDOH — SOCIAL STABILITY: SOCIAL INSECURITY
WITHIN THE LAST YEAR, HAVE YOU BEEN KICKED, HIT, SLAPPED, OR OTHERWISE PHYSICALLY HURT BY YOUR PARTNER OR EX-PARTNER?: NO

## 2025-02-02 SDOH — HEALTH STABILITY: MENTAL HEALTH: HOW MANY DRINKS CONTAINING ALCOHOL DO YOU HAVE ON A TYPICAL DAY WHEN YOU ARE DRINKING?: PATIENT DOES NOT DRINK

## 2025-02-02 SDOH — ECONOMIC STABILITY: HOUSING INSECURITY: AT ANY TIME IN THE PAST 12 MONTHS, WERE YOU HOMELESS OR LIVING IN A SHELTER (INCLUDING NOW)?: NO

## 2025-02-02 SDOH — HEALTH STABILITY: MENTAL HEALTH: HOW OFTEN DO YOU HAVE A DRINK CONTAINING ALCOHOL?: NEVER

## 2025-02-02 SDOH — ECONOMIC STABILITY: FOOD INSECURITY: WITHIN THE PAST 12 MONTHS, YOU WORRIED THAT YOUR FOOD WOULD RUN OUT BEFORE YOU GOT THE MONEY TO BUY MORE.: NEVER TRUE

## 2025-02-02 SDOH — ECONOMIC STABILITY: HOUSING INSECURITY: IN THE PAST 12 MONTHS, HOW MANY TIMES HAVE YOU MOVED WHERE YOU WERE LIVING?: 0

## 2025-02-02 SDOH — ECONOMIC STABILITY: HOUSING INSECURITY: IN THE LAST 12 MONTHS, WAS THERE A TIME WHEN YOU WERE NOT ABLE TO PAY THE MORTGAGE OR RENT ON TIME?: NO

## 2025-02-02 SDOH — ECONOMIC STABILITY: FOOD INSECURITY: WITHIN THE PAST 12 MONTHS, THE FOOD YOU BOUGHT JUST DIDN'T LAST AND YOU DIDN'T HAVE MONEY TO GET MORE.: NEVER TRUE

## 2025-02-02 SDOH — SOCIAL STABILITY: SOCIAL INSECURITY: DO YOU FEEL UNSAFE GOING BACK TO THE PLACE WHERE YOU ARE LIVING?: NO

## 2025-02-02 SDOH — ECONOMIC STABILITY: FOOD INSECURITY: HOW HARD IS IT FOR YOU TO PAY FOR THE VERY BASICS LIKE FOOD, HOUSING, MEDICAL CARE, AND HEATING?: NOT VERY HARD

## 2025-02-02 SDOH — SOCIAL STABILITY: SOCIAL INSECURITY: ABUSE: ADULT

## 2025-02-02 SDOH — SOCIAL STABILITY: SOCIAL INSECURITY: DOES ANYONE TRY TO KEEP YOU FROM HAVING/CONTACTING OTHER FRIENDS OR DOING THINGS OUTSIDE YOUR HOME?: NO

## 2025-02-02 SDOH — ECONOMIC STABILITY: INCOME INSECURITY: IN THE PAST 12 MONTHS HAS THE ELECTRIC, GAS, OIL, OR WATER COMPANY THREATENED TO SHUT OFF SERVICES IN YOUR HOME?: NO

## 2025-02-02 SDOH — HEALTH STABILITY: MENTAL HEALTH
DO YOU FEEL STRESS - TENSE, RESTLESS, NERVOUS, OR ANXIOUS, OR UNABLE TO SLEEP AT NIGHT BECAUSE YOUR MIND IS TROUBLED ALL THE TIME - THESE DAYS?: NOT AT ALL

## 2025-02-02 SDOH — SOCIAL STABILITY: SOCIAL INSECURITY: HAS ANYONE EVER THREATENED TO HURT YOUR FAMILY OR YOUR PETS?: NO

## 2025-02-02 SDOH — SOCIAL STABILITY: SOCIAL INSECURITY: HAVE YOU HAD THOUGHTS OF HARMING ANYONE ELSE?: NO

## 2025-02-02 ASSESSMENT — COLUMBIA-SUICIDE SEVERITY RATING SCALE - C-SSRS
6. HAVE YOU EVER DONE ANYTHING, STARTED TO DO ANYTHING, OR PREPARED TO DO ANYTHING TO END YOUR LIFE?: NO
2. HAVE YOU ACTUALLY HAD ANY THOUGHTS OF KILLING YOURSELF?: NO
1. IN THE PAST MONTH, HAVE YOU WISHED YOU WERE DEAD OR WISHED YOU COULD GO TO SLEEP AND NOT WAKE UP?: NO

## 2025-02-02 ASSESSMENT — PATIENT HEALTH QUESTIONNAIRE - PHQ9
SUM OF ALL RESPONSES TO PHQ9 QUESTIONS 1 & 2: 0
1. LITTLE INTEREST OR PLEASURE IN DOING THINGS: NOT AT ALL
2. FEELING DOWN, DEPRESSED OR HOPELESS: NOT AT ALL

## 2025-02-02 ASSESSMENT — LIFESTYLE VARIABLES
SKIP TO QUESTIONS 9-10: 1
SKIP TO QUESTIONS 9-10: 1
HAVE PEOPLE ANNOYED YOU BY CRITICIZING YOUR DRINKING: NO
TOTAL SCORE: 0
SUBSTANCE_ABUSE_PAST_12_MONTHS: NO
AUDIT-C TOTAL SCORE: 0
EVER HAD A DRINK FIRST THING IN THE MORNING TO STEADY YOUR NERVES TO GET RID OF A HANGOVER: NO
HOW OFTEN DO YOU HAVE 6 OR MORE DRINKS ON ONE OCCASION: NEVER
EVER FELT BAD OR GUILTY ABOUT YOUR DRINKING: NO
HOW MANY STANDARD DRINKS CONTAINING ALCOHOL DO YOU HAVE ON A TYPICAL DAY: PATIENT DOES NOT DRINK
PRESCIPTION_ABUSE_PAST_12_MONTHS: NO
HAVE YOU EVER FELT YOU SHOULD CUT DOWN ON YOUR DRINKING: NO
AUDIT-C TOTAL SCORE: 0
AUDIT-C TOTAL SCORE: 0
HOW OFTEN DO YOU HAVE A DRINK CONTAINING ALCOHOL: NEVER

## 2025-02-02 ASSESSMENT — COGNITIVE AND FUNCTIONAL STATUS - GENERAL
STANDING UP FROM CHAIR USING ARMS: A LITTLE
PATIENT BASELINE BEDBOUND: NO
DRESSING REGULAR LOWER BODY CLOTHING: A LITTLE
MOBILITY SCORE: 20
DAILY ACTIVITIY SCORE: 21
HELP NEEDED FOR BATHING: A LITTLE
TOILETING: A LITTLE
CLIMB 3 TO 5 STEPS WITH RAILING: A LITTLE
MOVING TO AND FROM BED TO CHAIR: A LITTLE
WALKING IN HOSPITAL ROOM: A LITTLE

## 2025-02-02 ASSESSMENT — ACTIVITIES OF DAILY LIVING (ADL)
ASSISTIVE_DEVICE: WALKER;WHEELCHAIR
HEARING - RIGHT EAR: FUNCTIONAL
FEEDING YOURSELF: INDEPENDENT
JUDGMENT_ADEQUATE_SAFELY_COMPLETE_DAILY_ACTIVITIES: YES
LACK_OF_TRANSPORTATION: NO
HEARING - LEFT EAR: FUNCTIONAL
ADEQUATE_TO_COMPLETE_ADL: YES
WALKS IN HOME: NEEDS ASSISTANCE
TOILETING: NEEDS ASSISTANCE
GROOMING: INDEPENDENT
DRESSING YOURSELF: INDEPENDENT
PATIENT'S MEMORY ADEQUATE TO SAFELY COMPLETE DAILY ACTIVITIES?: YES
LACK_OF_TRANSPORTATION: NO
BATHING: INDEPENDENT

## 2025-02-02 ASSESSMENT — PAIN SCALES - GENERAL: PAINLEVEL_OUTOF10: 0 - NO PAIN

## 2025-02-02 ASSESSMENT — PAIN - FUNCTIONAL ASSESSMENT
PAIN_FUNCTIONAL_ASSESSMENT: 0-10
PAIN_FUNCTIONAL_ASSESSMENT: 0-10

## 2025-02-02 NOTE — ED PROVIDER NOTES
The patient was seen by the midlevel/resident.  I have personally saw the patient and made/approved the management plan and take responsibility for the patient management.  I reviewed the EKG's (when done) and agree with the interpretation.  I have seen and examined the patient; agree with the workup, evaluation, MDM, and diagnosis.  The care plan has been discussed with the midlevel/resident; I have reviewed the note and agree with the documented findings.     Worked up in the emergency room for difficulty urinating.  Patient has been taking antibiotics at the nursing home ertapenem for UTI since 1/29/2025.  We worked up with UA and labs.  I am not finding a significant source in the urine we will need to broaden evaluation to include CT imaging.  Spoke to patient and family at bedside.  Found to have pneumonia and will be hospitalized. Will treat hypernatremia.  Diagnoses as of 02/02/25 2024   Hypernatremia   Pleural effusion     MD Jj Sherman MD  02/02/25 2024

## 2025-02-02 NOTE — ED PROVIDER NOTES
HPI   Chief Complaint   Patient presents with    Difficulty Urinating     BIBS from [place]. Was dx on Monday with a UTI and was started on IM ATB. Continues to report difficulty urinating and some burning.        This is a 89-year-old female with PMH CKD stage V, HTN, HLD, anemia presenting with daughter from [place] for complaint of fatigue, possible dehydration.  Daughter states has been being treated with ertapenem at [facility] for pneumonia and UTI due to numerous allergies and renal history.  Daughter states patient complaining of lower abdominal pain and difficulty urinating and the daughter is concerned she is dehydrated due to poor oral intake.  Is coughing and it seems like it is getting worse.  Patient reports being very tired.      History provided by:  Relative   used: No            Patient History   Past Medical History:   Diagnosis Date    Other conditions influencing health status     Coronary Artery Disease    Other constipation     Chronic constipation    Pain in unspecified hip     Joint pain, hip    Personal history of other diseases of the circulatory system     History of hypertension    Personal history of other diseases of the respiratory system     History of allergic rhinitis    Personal history of other endocrine, nutritional and metabolic disease     History of hyperlipidemia    Personal history of other medical treatment 11/14/2019    History of screening mammography    Personal history of other mental and behavioral disorders     History of depression     Past Surgical History:   Procedure Laterality Date    APPENDECTOMY  05/07/2013    Appendectomy    CHOLECYSTECTOMY  11/30/2015    Cholecystectomy Laparoscopic    OTHER SURGICAL HISTORY  05/07/2013    Treatment Of Hip Fracture    OTHER SURGICAL HISTORY  05/07/2013    Marsupialization Of Ovarian Cyst Laparoscopic    TOTAL ABDOMINAL HYSTERECTOMY  05/07/2013    Total Abdominal Hysterectomy     No family history on  file.  Social History     Tobacco Use    Smoking status: Never    Smokeless tobacco: Never   Vaping Use    Vaping status: Not on file   Substance Use Topics    Alcohol use: Never    Drug use: Never       Physical Exam   ED Triage Vitals [02/02/25 1430]   Temperature Heart Rate Respirations BP   36.8 °C (98.2 °F) 76 17 (!) 152/92      Pulse Ox Temp Source Heart Rate Source Patient Position   96 % Temporal -- --      BP Location FiO2 (%)     Right arm --       Physical Exam  Constitutional:       Comments: Elderly tired appearing.  Elderly.  No acute distress.   HENT:      Mouth/Throat:      Mouth: Mucous membranes are dry.   Cardiovascular:      Rate and Rhythm: Normal rate and regular rhythm.      Pulses: Normal pulses.      Heart sounds: Murmur heard.   Pulmonary:      Effort: Pulmonary effort is normal.      Comments: Coarse breath sounds at the bases  Abdominal:      General: There is no distension.      Palpations: Abdomen is soft.      Tenderness: There is abdominal tenderness (Across lower abdomen). There is no guarding or rebound.   Musculoskeletal:      Cervical back: Normal range of motion and neck supple.   Neurological:      Mental Status: She is alert and oriented to person, place, and time. Mental status is at baseline.           ED Course & MDM   Diagnoses as of 02/02/25 1830   Hypernatremia                 No data recorded     [place] Coma Scale Score: 14 (02/02/25 1433 : Juanis Morgan RN)                           Medical Decision Making  Physical exam as above.  Stable hemodynamically.  Appears dry.  Tired appearing.  Patient's renal function is baseline.  She is hypernatremic 151.  I suspect this is secondary to dehydration.  Was given total a liter of normal saline IV.  Imaging positive for pneumonia.  Given hyponatremia I suspect patient would benefit from gentle fluid resuscitation and I discussed with the hospitalist who accepted.  This visit was staffed with the attending physician   Anne-Marie.      Disclaimer: This note was dictated using speech recognition software. An attempt at proofreading was made to minimize errors. Minor errors in transcription may be present. Please call if questions.    Amount and/or Complexity of Data Reviewed  Independent Historian:      Details: daughter  Labs: ordered.  Radiology: ordered.  ECG/medicine tests: ordered and independent interpretation performed.     Details: EKG interpreted by me: Normal sinus rhythm.  Rate 75.  Left axis deviation.  LVH.  Inferior Q waves.  QTc 471.  No STEMI.        Procedure  Procedures     Flakito Barahona PA-C  02/02/25 5678

## 2025-02-03 ENCOUNTER — APPOINTMENT (OUTPATIENT)
Dept: RADIOLOGY | Facility: HOSPITAL | Age: OVER 89
End: 2025-02-03
Payer: COMMERCIAL

## 2025-02-03 LAB
ALBUMIN SERPL BCP-MCNC: 3.1 G/DL (ref 3.4–5)
ANION GAP SERPL CALC-SCNC: 15 MMOL/L (ref 10–20)
ANION GAP SERPL CALC-SCNC: 15 MMOL/L (ref 10–20)
ATRIAL RATE: 75 BPM
BASOPHILS # BLD AUTO: 0.05 X10*3/UL (ref 0–0.1)
BASOPHILS NFR BLD AUTO: 0.6 %
BUN SERPL-MCNC: 70 MG/DL (ref 6–23)
BUN SERPL-MCNC: 72 MG/DL (ref 6–23)
CALCIUM SERPL-MCNC: 7.7 MG/DL (ref 8.6–10.3)
CALCIUM SERPL-MCNC: 7.8 MG/DL (ref 8.6–10.3)
CHLORIDE SERPL-SCNC: 114 MMOL/L (ref 98–107)
CHLORIDE SERPL-SCNC: 115 MMOL/L (ref 98–107)
CO2 SERPL-SCNC: 24 MMOL/L (ref 21–32)
CO2 SERPL-SCNC: 24 MMOL/L (ref 21–32)
CREAT SERPL-MCNC: 3.35 MG/DL (ref 0.5–1.05)
CREAT SERPL-MCNC: 3.47 MG/DL (ref 0.5–1.05)
CRP SERPL-MCNC: 4.75 MG/DL
CRP SERPL-MCNC: 5.73 MG/DL
EGFRCR SERPLBLD CKD-EPI 2021: 12 ML/MIN/1.73M*2
EGFRCR SERPLBLD CKD-EPI 2021: 13 ML/MIN/1.73M*2
EOSINOPHIL # BLD AUTO: 0.17 X10*3/UL (ref 0–0.4)
EOSINOPHIL NFR BLD AUTO: 2.1 %
ERYTHROCYTE [DISTWIDTH] IN BLOOD BY AUTOMATED COUNT: 15.9 % (ref 11.5–14.5)
GLUCOSE SERPL-MCNC: 105 MG/DL (ref 74–99)
GLUCOSE SERPL-MCNC: 124 MG/DL (ref 74–99)
HCT VFR BLD AUTO: 38.1 % (ref 36–46)
HGB BLD-MCNC: 11.9 G/DL (ref 12–16)
HOLD SPECIMEN: NORMAL
IMM GRANULOCYTES # BLD AUTO: 0.07 X10*3/UL (ref 0–0.5)
IMM GRANULOCYTES NFR BLD AUTO: 0.9 % (ref 0–0.9)
LDH SERPL L TO P-CCNC: 216 U/L (ref 84–246)
LEGIONELLA AG UR QL: NEGATIVE
LYMPHOCYTES # BLD AUTO: 0.81 X10*3/UL (ref 0.8–3)
LYMPHOCYTES NFR BLD AUTO: 10 %
MAGNESIUM SERPL-MCNC: 2.28 MG/DL (ref 1.6–2.4)
MCH RBC QN AUTO: 30.6 PG (ref 26–34)
MCHC RBC AUTO-ENTMCNC: 31.2 G/DL (ref 32–36)
MCV RBC AUTO: 98 FL (ref 80–100)
MONOCYTES # BLD AUTO: 0.65 X10*3/UL (ref 0.05–0.8)
MONOCYTES NFR BLD AUTO: 8.1 %
NEUTROPHILS # BLD AUTO: 6.32 X10*3/UL (ref 1.6–5.5)
NEUTROPHILS NFR BLD AUTO: 78.3 %
NRBC BLD-RTO: 0 /100 WBCS (ref 0–0)
P AXIS: 59 DEGREES
P OFFSET: 160 MS
P ONSET: 122 MS
PHOSPHATE SERPL-MCNC: 4.8 MG/DL (ref 2.5–4.9)
PLATELET # BLD AUTO: 271 X10*3/UL (ref 150–450)
POTASSIUM SERPL-SCNC: 4.1 MMOL/L (ref 3.5–5.3)
POTASSIUM SERPL-SCNC: 4.1 MMOL/L (ref 3.5–5.3)
PR INTERVAL: 194 MS
PROCALCITONIN SERPL-MCNC: 0.25 NG/ML
PROT SERPL-MCNC: 6.8 G/DL (ref 6.4–8.2)
Q ONSET: 219 MS
QRS COUNT: 12 BEATS
QRS DURATION: 76 MS
QT INTERVAL: 422 MS
QTC CALCULATION(BAZETT): 471 MS
QTC FREDERICIA: 454 MS
R AXIS: -44 DEGREES
RBC # BLD AUTO: 3.89 X10*6/UL (ref 4–5.2)
S PNEUM AG UR QL: NEGATIVE
SODIUM SERPL-SCNC: 149 MMOL/L (ref 136–145)
SODIUM SERPL-SCNC: 150 MMOL/L (ref 136–145)
T AXIS: 103 DEGREES
T OFFSET: 430 MS
VENTRICULAR RATE: 75 BPM
WBC # BLD AUTO: 8.1 X10*3/UL (ref 4.4–11.3)

## 2025-02-03 PROCEDURE — 83615 LACTATE (LD) (LDH) ENZYME: CPT | Performed by: INTERNAL MEDICINE

## 2025-02-03 PROCEDURE — 84145 PROCALCITONIN (PCT): CPT | Mod: GEALAB | Performed by: INTERNAL MEDICINE

## 2025-02-03 PROCEDURE — 86140 C-REACTIVE PROTEIN: CPT | Performed by: INTERNAL MEDICINE

## 2025-02-03 PROCEDURE — 97161 PT EVAL LOW COMPLEX 20 MIN: CPT | Mod: GP

## 2025-02-03 PROCEDURE — 2500000001 HC RX 250 WO HCPCS SELF ADMINISTERED DRUGS (ALT 637 FOR MEDICARE OP): Performed by: INTERNAL MEDICINE

## 2025-02-03 PROCEDURE — 85025 COMPLETE CBC W/AUTO DIFF WBC: CPT | Performed by: INTERNAL MEDICINE

## 2025-02-03 PROCEDURE — 99232 SBSQ HOSP IP/OBS MODERATE 35: CPT | Performed by: INTERNAL MEDICINE

## 2025-02-03 PROCEDURE — 36415 COLL VENOUS BLD VENIPUNCTURE: CPT | Performed by: INTERNAL MEDICINE

## 2025-02-03 PROCEDURE — 82374 ASSAY BLOOD CARBON DIOXIDE: CPT | Performed by: INTERNAL MEDICINE

## 2025-02-03 PROCEDURE — 2500000004 HC RX 250 GENERAL PHARMACY W/ HCPCS (ALT 636 FOR OP/ED): Performed by: INTERNAL MEDICINE

## 2025-02-03 PROCEDURE — 84155 ASSAY OF PROTEIN SERUM: CPT | Performed by: INTERNAL MEDICINE

## 2025-02-03 PROCEDURE — 83735 ASSAY OF MAGNESIUM: CPT | Performed by: INTERNAL MEDICINE

## 2025-02-03 PROCEDURE — 80069 RENAL FUNCTION PANEL: CPT | Performed by: INTERNAL MEDICINE

## 2025-02-03 PROCEDURE — 83605 ASSAY OF LACTIC ACID: CPT | Performed by: PHYSICIAN ASSISTANT

## 2025-02-03 PROCEDURE — 76604 US EXAM CHEST: CPT

## 2025-02-03 PROCEDURE — 83930 ASSAY OF BLOOD OSMOLALITY: CPT | Mod: GEALAB | Performed by: INTERNAL MEDICINE

## 2025-02-03 PROCEDURE — 1200000002 HC GENERAL ROOM WITH TELEMETRY DAILY

## 2025-02-03 PROCEDURE — 76604 US EXAM CHEST: CPT | Performed by: RADIOLOGY

## 2025-02-03 RX ORDER — LORAZEPAM 2 MG/ML
0.5 INJECTION INTRAMUSCULAR ONCE
Status: COMPLETED | OUTPATIENT
Start: 2025-02-03 | End: 2025-02-03

## 2025-02-03 RX ORDER — VANCOMYCIN HYDROCHLORIDE 1 G/20ML
INJECTION, POWDER, LYOPHILIZED, FOR SOLUTION INTRAVENOUS DAILY PRN
Status: DISCONTINUED | OUTPATIENT
Start: 2025-02-03 | End: 2025-02-10

## 2025-02-03 RX ORDER — VANCOMYCIN HYDROCHLORIDE 750 MG/150ML
750 INJECTION, SOLUTION INTRAVENOUS ONCE
Status: COMPLETED | OUTPATIENT
Start: 2025-02-03 | End: 2025-02-03

## 2025-02-03 RX ORDER — CARVEDILOL 12.5 MG/1
12.5 TABLET ORAL 2 TIMES DAILY
Status: DISCONTINUED | OUTPATIENT
Start: 2025-02-03 | End: 2025-02-13

## 2025-02-03 RX ORDER — DEXTROSE MONOHYDRATE AND SODIUM CHLORIDE 5; .45 G/100ML; G/100ML
75 INJECTION, SOLUTION INTRAVENOUS CONTINUOUS
Status: DISCONTINUED | OUTPATIENT
Start: 2025-02-03 | End: 2025-02-04

## 2025-02-03 RX ADMIN — GABAPENTIN 100 MG: 100 CAPSULE ORAL at 20:21

## 2025-02-03 RX ADMIN — DEXTROSE AND SODIUM CHLORIDE 75 ML/HR: 5; 450 INJECTION, SOLUTION INTRAVENOUS at 13:47

## 2025-02-03 RX ADMIN — ACETAMINOPHEN 975 MG: 325 TABLET, FILM COATED ORAL at 06:26

## 2025-02-03 RX ADMIN — CARVEDILOL 12.5 MG: 12.5 TABLET, FILM COATED ORAL at 20:20

## 2025-02-03 RX ADMIN — ASPIRIN 81 MG: 81 TABLET, COATED ORAL at 08:24

## 2025-02-03 RX ADMIN — SENNOSIDES AND DOCUSATE SODIUM 2 TABLET: 50; 8.6 TABLET ORAL at 20:21

## 2025-02-03 RX ADMIN — SENNOSIDES AND DOCUSATE SODIUM 2 TABLET: 50; 8.6 TABLET ORAL at 08:23

## 2025-02-03 RX ADMIN — CETIRIZINE HYDROCHLORIDE 5 MG: 10 TABLET, FILM COATED ORAL at 08:24

## 2025-02-03 RX ADMIN — ERTAPENEM SODIUM 500 MG: 1 INJECTION, POWDER, LYOPHILIZED, FOR SOLUTION INTRAMUSCULAR; INTRAVENOUS at 23:43

## 2025-02-03 RX ADMIN — SODIUM BICARBONATE 650 MG: 650 TABLET ORAL at 20:21

## 2025-02-03 RX ADMIN — SODIUM BICARBONATE 650 MG: 650 TABLET ORAL at 08:24

## 2025-02-03 RX ADMIN — BENZONATATE 200 MG: 100 CAPSULE ORAL at 14:42

## 2025-02-03 RX ADMIN — HEPARIN SODIUM 5000 UNITS: 5000 INJECTION INTRAVENOUS; SUBCUTANEOUS at 14:42

## 2025-02-03 RX ADMIN — CARVEDILOL 12.5 MG: 12.5 TABLET, FILM COATED ORAL at 09:42

## 2025-02-03 RX ADMIN — ACETAMINOPHEN 975 MG: 325 TABLET, FILM COATED ORAL at 14:42

## 2025-02-03 RX ADMIN — LORAZEPAM 0.5 MG: 2 INJECTION INTRAMUSCULAR; INTRAVENOUS at 23:40

## 2025-02-03 RX ADMIN — VANCOMYCIN HYDROCHLORIDE 750 MG: 750 INJECTION, SOLUTION INTRAVENOUS at 11:46

## 2025-02-03 RX ADMIN — BENZONATATE 200 MG: 100 CAPSULE ORAL at 20:21

## 2025-02-03 RX ADMIN — ACETAMINOPHEN 975 MG: 325 TABLET, FILM COATED ORAL at 20:24

## 2025-02-03 RX ADMIN — BENZONATATE 200 MG: 100 CAPSULE ORAL at 08:24

## 2025-02-03 RX ADMIN — PRAVASTATIN SODIUM 20 MG: 40 TABLET ORAL at 20:20

## 2025-02-03 RX ADMIN — POLYETHYLENE GLYCOL 3350 17 G: 17 POWDER, FOR SOLUTION ORAL at 20:22

## 2025-02-03 RX ADMIN — Medication 1 TABLET: at 09:42

## 2025-02-03 RX ADMIN — HEPARIN SODIUM 5000 UNITS: 5000 INJECTION INTRAVENOUS; SUBCUTANEOUS at 23:41

## 2025-02-03 ASSESSMENT — ACTIVITIES OF DAILY LIVING (ADL): ADL_ASSISTANCE: INDEPENDENT

## 2025-02-03 ASSESSMENT — PAIN - FUNCTIONAL ASSESSMENT
PAIN_FUNCTIONAL_ASSESSMENT: 0-10
PAIN_FUNCTIONAL_ASSESSMENT: WONG-BAKER FACES

## 2025-02-03 ASSESSMENT — COGNITIVE AND FUNCTIONAL STATUS - GENERAL
MOBILITY SCORE: 7
TOILETING: A LITTLE
DAILY ACTIVITIY SCORE: 21
WALKING IN HOSPITAL ROOM: TOTAL
MOBILITY SCORE: 20
TURNING FROM BACK TO SIDE WHILE IN FLAT BAD: TOTAL
MOVING FROM LYING ON BACK TO SITTING ON SIDE OF FLAT BED WITH BEDRAILS: A LOT
MOVING TO AND FROM BED TO CHAIR: TOTAL
HELP NEEDED FOR BATHING: A LITTLE
CLIMB 3 TO 5 STEPS WITH RAILING: TOTAL
CLIMB 3 TO 5 STEPS WITH RAILING: A LITTLE
STANDING UP FROM CHAIR USING ARMS: A LITTLE
WALKING IN HOSPITAL ROOM: A LITTLE
DRESSING REGULAR LOWER BODY CLOTHING: A LITTLE
MOVING TO AND FROM BED TO CHAIR: A LITTLE
STANDING UP FROM CHAIR USING ARMS: TOTAL

## 2025-02-03 ASSESSMENT — PAIN SCALES - GENERAL: PAINLEVEL_OUTOF10: 0 - NO PAIN

## 2025-02-03 ASSESSMENT — PAIN DESCRIPTION - DESCRIPTORS: DESCRIPTORS: PATIENT UNABLE TO DESCRIBE

## 2025-02-03 ASSESSMENT — PAIN SCALES - WONG BAKER: WONGBAKER_NUMERICALRESPONSE: HURTS WHOLE LOT

## 2025-02-03 NOTE — CONSULTS
Vancomycin Dosing by Pharmacy- INITIAL    Camelia Jones is a 89 y.o. year old female who Pharmacy has been consulted for vancomycin dosing for pneumonia. Based on the patient's indication and renal status this patient will be dosed based on a goal AUC of 500-600, Trough 15-20.     Renal function is currently declining, CKD 4, SCR 3.47, CRCL 8.5, not on dialysis as per RN.      Visit Vitals  /84   Pulse 82   Temp 36.7 °C (98.1 °F)   Resp 18        Lab Results   Component Value Date    CREATININE 3.47 (H) 2025    CREATININE 3.63 (H) 2025    CREATININE 3.71 (H) 2025    CREATININE 1.93 (H) 2023    CREATININE 1.98 (H) 2023    CREATININE 1.99 (H) 2023    CREATININE 1.92 (H) 2023        Patient weight is as follows:   Vitals:    25 0600   Weight: 58.5 kg (129 lb)       Cultures:  No results found for the encounter in last 14 days.        No intake/output data recorded.  I/O during current shift:  No intake/output data recorded.    Temp (24hrs), Av.7 °C (98.1 °F), Min:36.6 °C (97.9 °F), Max:36.8 °C (98.2 °F)         Assessment/Plan     Patient will not be given a loading dose.  Will initiate vancomycin maintenance, a one time dose of 750 mg.    This dosing regimen is predicted by [employer] to result in the following pharmacokinetic parameters:    Not utilized.    Follow-up level will be ordered on  at 0500hrs unless clinically indicated sooner.  Will continue to monitor renal function daily while on vancomycin and order serum creatinine at least every 48 hours if not already ordered.  Follow for continued vancomycin needs, clinical response, and signs/symptoms of toxicity.       Jaime Conner, EssieD

## 2025-02-03 NOTE — H&P
[facility] Hospitalist History and Physical Note         Camelia Jones  :  1934(89 y.o.)  MRN:  [id number]  PCP: Trav Rodriguez MD    Assessment & Plan  Hypernatremia      Assessment and Plan:     Camelia Jones is a 89 y.o. female from ECF with PMH HTN, HLD, chronic anemia, CKD IV, chronic LE edema, pancreatitis, RLE DVT with post thrombotic syndrome, hx sacral ala / L5 fx, chronic hyponatremia.    Patient presented from ECF with several days of worsening mentation.  Associated with poor p.o. intake.  Patient was recently diagnosed with UTI and pneumonia at facility initially placed on doxycycline and later transitioned to Invanz IM.  Currently on fourth day of Invanz and a total of 1 week of antibiotics prior to arrival in the ED.  Patient admitted to clear productive cough and fatigue.  Denies fever, rigor, chest pain, dyspnea, worsening lower extremity edema, dysuria, anuria.  Per family at bedside mentation has significantly improved following IVF in ED    In ED VS was significant for mild hypertension.  Labs were significant for hyponatremia (151), hyperchloremia (114), azotemia from baseline (78/3.71), elevated BNP (861), elevated troponin (42), negative leukocytosis, negative flu/RSV/COVID 19.  CT head was unrevealing for acute intracranial processes.  CT chest abdomen pelvis shows RML and RLL pneumonia, moderate L pleural effusion and trace R pleural effusion.  ECG shows no acute ST changes or malignant arrhythmia.  Patient was admitted for hypernatremia and WARREN.    #Hyponatremia  #WARREN/CKD IV  #B/L pleural effusion (L>R) w/o hypoxia  #Recent RML/RLL pneumonia PTA  #Chronic LE edema  #Hx DVT  #Hx chronic hyponatremia  #Anemia of chronic disease  -PRN IVF (D5W/0.45%NS) w/ caution 2/2 pleural effusion  -resume home invanz pending resp cultures  -home home antihypertensive and diuretics (torsemide) 2/2 WARREN  -will consider discontinuing home amlodipine as it can contribute to LE edema  -PT/OT  -L  Wilfredo order placed    Disposition: await test results, await clinical improvement, and await placement    DVT Prophylaxis: Subq Heparin    Code status: No Order  Diet: No diet orders on file    BMI Classification: Body mass index is 24.87 kg/m². Normal BMI 18.5-24.9    Level of MDM:  High    patient and family updated, I personally examined the patient, and I personally reviewed chart, data, labs radiology reports    Family Communication  Number:   Name of Designated Family Representative:       Total time spent: 75 minutes, of total time providing counseling or in coordination of care.  Total time on this day of visit includes record and documentation review before and after visit including documentation and time not explicitly included on EMR time stamp.    [id number]: Please page me for patient care issues.  [phone]: Please page night hospitalist for any issues.        Subjective:     Chief Complaint   Patient presents with    Difficulty Urinating     BIBS from [facility]. Was dx on Monday with a UTI and was started on IM ATB. Continues to report difficulty urinating and some burning.      Interval History:  Camelia Jones is a 89 y.o. female from [facility] with PMH HTN, HLD, chronic anemia, CKD IV, chronic LE edema, pancreatitis, RLE DVT with post thrombotic syndrome, hx sacral ala / L5 fx, chronic hyponatremia.    Patient presented from [facility] with several days of worsening mentation.  Associated with poor p.o. intake.  Patient was recently diagnosed with UTI and pneumonia at facility initially placed on doxycycline and later transitioned to Invanz IM.  Currently on fourth day of Invanz and a total of 1 week of antibiotics prior to arrival in the ED.  Patient admitted to clear productive cough and fatigue.  Denies fever, rigor, chest pain, dyspnea, worsening lower extremity edema, dysuria, anuria.  Per family at bedside mentation has significantly improved following IVF in ED    In ED VS was significant for mild  hypertension.  Labs were significant for hyponatremia (151), hyperchloremia (114), azotemia from baseline (78/3.71), elevated BNP (861), elevated troponin (42), negative leukocytosis, negative flu/RSV/COVID 19.  CT head was unrevealing for acute intracranial processes.  CT chest abdomen pelvis shows RML and RLL pneumonia, moderate L pleural effusion and trace R pleural effusion.  ECG shows no acute ST changes or malignant arrhythmia.  Patient was admitted for hypernatremia and WARREN.    Temperature:  [36.8 °C (98.2 °F)] 36.8 °C (98.2 °F)  Heart Rate:  [76] 76  Respirations:  [17] 17  BP: (152)/(92) 152/92  Lab Results   Component Value Date    GLUCOSE 97 02/02/2025    CALCIUM 8.1 (L) 02/02/2025     (H) 02/02/2025    K 4.4 02/02/2025    CO2 25 02/02/2025     (H) 02/02/2025    BUN 78 (H) 02/02/2025    CREATININE 3.71 (H) 02/02/2025     Lab Results   Component Value Date    WBC 8.6 02/02/2025    HGB 12.5 02/02/2025    HCT 40.3 02/02/2025     (H) 02/02/2025     02/02/2025     IMAGES:  No results found for this or any previous visit (from the past 4464 hours).     No echocardiogram results found for the past 12 months  === 02/02/25 ===    XR CHEST 2 VIEWS    - Impression -  Small left pleural effusion.    MACRO:  None    Signed by: Nadia Malik 2/2/2025 4:54 PM  Dictation workstation:   [id number]  === 02/02/25 ===    CT CHEST ABDOMEN PELVIS WO CONTRAST    - Impression -  Chest  1.  Subtle patchy nodular airspace opacities are present in the  lingula, right middle lobe and to lesser extent left lower lobe.  Correlate with any symptoms of developing pneumonia.  2. Moderate left-sided and trace right-sided pleural effusion with  some atelectasis in the lower lobes.  3. Moderate coronary artery calcifications.    Abdomen-Pelvis  1.  Liquid stool is present throughout the colon without inflammatory  wall thickening. Correlate with any enterocolitis.  2. Scattered diverticula are present in the  colon without evidence of  acute diverticulitis.  3. Fat containing right inguinal hernia.      MACRO:  None    Signed by: Rui Hayes 2/2/2025 5:53 PM  Dictation workstation:   [id number]  === 02/02/25 ===    XR CHEST 2 VIEWS    - Impression -  Small left pleural effusion.    MACRO:  None    Signed by: Nadia Malik 2/2/2025 4:54 PM  Dictation workstation:   [id number]  === 08/12/15 ===    - Impression -  There is extrahepatic biliary dilatation, with the common  duct measuring up to 9.7 mm.  No choledocholithiasis seen, and on the  MRCP images there appears to be smooth tapering of the duct towards  the ampulla, but a degree of ampullary stenosis again is not  excluded.  There is also an area of kinking in the proximal common  duct after the cystic duct insertion, though this is felt to be  physiologic, and proximally the duct is less ectatic.    Minimal layering high density material in the gallbladder lumen may  represent bile.    Renal cysts.    Scattered prominent subcentimeter mesenteric lymph nodes are  nonspecific.    Past Medical History:   Diagnosis Date    Other conditions influencing health status     Coronary Artery Disease    Other constipation     Chronic constipation    Pain in unspecified hip     Joint pain, hip    Personal history of other diseases of the circulatory system     History of hypertension    Personal history of other diseases of the respiratory system     History of allergic rhinitis    Personal history of other endocrine, nutritional and metabolic disease     History of hyperlipidemia    Personal history of other medical treatment 11/14/2019    History of screening mammography    Personal history of other mental and behavioral disorders     History of depression     Past Surgical History:   Procedure Laterality Date    APPENDECTOMY  05/07/2013    Appendectomy    CHOLECYSTECTOMY  11/30/2015    Cholecystectomy Laparoscopic    OTHER SURGICAL HISTORY  05/07/2013     Treatment Of Hip Fracture    OTHER SURGICAL HISTORY  05/07/2013    Marsupialization Of Ovarian Cyst Laparoscopic    TOTAL ABDOMINAL HYSTERECTOMY  05/07/2013    Total Abdominal Hysterectomy     No family history on file.  Social History     Socioeconomic History    Marital status:      Spouse name: Not on file    Number of children: Not on file    Years of education: Not on file    Highest education level: Not on file   Occupational History    Not on file   Tobacco Use    Smoking status: Never    Smokeless tobacco: Never   Vaping Use    Vaping status: Not on file   Substance and Sexual Activity    Alcohol use: Never    Drug use: Never    Sexual activity: Defer   Other Topics Concern    Not on file   Social History Narrative    Not on file     Social Drivers of Health     Financial Resource Strain: Not on file   Food Insecurity: Not on file   Transportation Needs: Not on file   Physical Activity: Not on file   Stress: Not on file   Social Connections: Not on file   Intimate Partner Violence: Not on file   Housing Stability: Not on file       Allergies   Allergen Reactions    Azithromycin Nausea Only    Ciprofloxacin Nausea Only    Codeine Nausea Only    Macrolide Antibiotics Nausea Only    Penicillins Unknown    Sulfa (Sulfonamide Antibiotics) Nausea Only     Prior to Admission medications    Medication Sig Start Date End Date Taking? Authorizing Provider   amLODIPine (Norvasc) 5 mg tablet Take by mouth once daily. 10/22/20   Historical Provider, MD   aspirin 81 mg EC tablet Take 1 tablet (81 mg) by mouth once daily.    Historical Provider, MD   biotin 1 mg tablet Take 1 tablet (1 mg) by mouth.    Historical Provider, MD   calcium carbonate (Oscal) 500 mg calcium (1,250 mg) tablet Take 250 mg by mouth.    Historical Provider, MD   carvedilol (Coreg) 12.5 mg tablet Take by mouth. 7/26/18   Historical Provider, MD   cetirizine (ZyrTEC) 10 mg chewable tablet Chew 1 tablet (10 mg) once daily.    Historical  Provider, MD   cyanocobalamin (Vitamin B-12) 1,000 mcg/mL injection 1,000 mL (1,000,000 mcg).    Historical Provider, MD   dextromethorphan-guaifenesin (Mucinex DM)  mg 12 hr tablet Take 1 tablet by mouth every 12 hours. Do not crush, chew, or split.    Historical Provider, MD   docusate sodium (Colace) 100 mg capsule Take 1 capsule (100 mg) by mouth 2 times a day.    Historical Provider, MD   ferrous sulfate 325 (65 Fe) MG tablet Take by mouth every 12 hours.    Historical Provider, MD   folic acid/vit B complex and C (B complex-vitamin C-folic acid) 400 mcg tablet extended release Take by mouth.    Historical Provider, MD   gabapentin (Neurontin) 100 mg capsule Take 1 capsule (100 mg) by mouth once daily at bedtime.    Historical Provider, MD   KRILL OIL ORAL Take 1,000 mg by mouth.    Historical Provider, MD   magnesium hydroxide (Milk of Magnesia) 400 mg/5 mL suspension Take 30 mL by mouth.    Historical Provider, MD   omega-3 acid ethyl esters (Lovaza) 1 gram capsule Take 1 capsule (1 g) by mouth once daily.    Historical Provider, MD   polyethylene glycol (Miralax) 17 gram/dose powder as directed Orally    Historical Provider, MD   pravastatin (Pravachol) 20 mg tablet Take 1 tablet (20 mg) by mouth once daily. 5/22/23 11/29/24  Historical Provider, MD   sennosides (Senokot) 8.6 mg tablet Take 1 tablet (8.6 mg) by mouth 2 times a day.    Historical Provider, MD   sodium bicarbonate 650 mg tablet Take 1 tablet (650 mg) by mouth 4 times a day.    Historical Provider, MD   torsemide (Demadex) 20 mg tablet Take 1 tablet (20 mg) by mouth once daily.    Historical Provider, MD   zinc gluconate 50 mg tablet 1 (one) time each day at the same time.    Historical Provider, MD       Review of Systems   All other systems reviewed and are negative.      Objective:     Vitals:    02/02/25 1430   BP: (!) 152/92   BP Location: Right arm   Pulse: 76   Resp: 17   Temp: 36.8 °C (98.2 °F)   TempSrc: Temporal   SpO2: 96%  "  Weight: 61.7 kg (136 lb)   Height: 1.575 m (5' 2\")     Physical Exam  Vitals and nursing note reviewed.   Constitutional:       Appearance: Normal appearance. She is ill-appearing.      Comments: Wet cough, frail elderly feal   HENT:      Head: Normocephalic and atraumatic.      Right Ear: External ear normal.      Left Ear: External ear normal.      Nose: Nose normal.      Mouth/Throat:      Mouth: Mucous membranes are moist.   Eyes:      General: No scleral icterus.        Right eye: No discharge.         Left eye: No discharge.      Extraocular Movements: Extraocular movements intact.      Conjunctiva/sclera: Conjunctivae normal.      Pupils: Pupils are equal, round, and reactive to light.   Cardiovascular:      Rate and Rhythm: Normal rate and regular rhythm.   Pulmonary:      Effort: Pulmonary effort is normal.      Breath sounds: Rhonchi present. No wheezing.   Abdominal:      General: Abdomen is flat. Bowel sounds are normal.      Palpations: Abdomen is soft.   Musculoskeletal:         General: Normal range of motion.      Right lower leg: Edema present.      Left lower leg: Edema present.   Skin:     General: Skin is warm and dry.      Capillary Refill: Capillary refill takes less than 2 seconds.   Neurological:      General: No focal deficit present.   Psychiatric:         Mood and Affect: Mood normal.         Thought Content: Thought content normal.         Judgment: Judgment normal.         Lab Results   Component Value Date     (H) 02/02/2025    K 4.4 02/02/2025     (H) 02/02/2025    CO2 25 02/02/2025    BUN 78 (H) 02/02/2025    CREATININE 3.71 (H) 02/02/2025    GLUCOSE 97 02/02/2025    CALCIUM 8.1 (L) 02/02/2025    PROT 6.7 02/02/2025    BILITOT 0.3 02/02/2025    ALKPHOS 74 02/02/2025    AST 19 02/02/2025    ALT 11 02/02/2025     Lab Results   Component Value Date    WBC 8.6 02/02/2025    HGB 12.5 02/02/2025    HCT 40.3 02/02/2025     (H) 02/02/2025     02/02/2025     Lab " Results   Component Value Date    TSH 1.40 02/02/2025     Lab Results   Component Value Date    LACTATE 0.5 09/19/2023     (H) 02/02/2025    INR 1.3 (H) 02/02/2025     Additional results since admission have been reviewed.    Dictated using [employer] Version 2.4  Proof read however unrecognized voice recognition errors may have occurred     Electronically signed by Bear Silveira DO on 02/02/25 at 7:14 PM

## 2025-02-03 NOTE — PROGRESS NOTES
02/03/25 1029   Discharge Planning   Living Arrangements Other (Comment)  (from [facility])   Support Systems Children   Assistance Needed baseline A&Ox3-4, self propels in wheelchair, assistance with ADLs   Type of Residence Nursing home/residential care   Do you have animals or pets at home? No   Home or Post Acute Services Post acute facilities (Rehab/SNF/etc)   Type of Post Acute Facility Services Long term care   Expected Discharge Disposition Inter  (Spoke with pt's dtr at the bedside, preference for pt to return to [facility] when medically ready. No precert)   Does the patient need discharge transport arranged? Yes   RoundTrip coordination needed? Yes   Has discharge transport been arranged? No   Patient Choice   Provider Choice list and CMS website (https://medicare.gov/care-compare#search) for post-acute Quality and Resource Measure Data were provided and reviewed with: Family   Patient / Family choosing to utilize agency / facility established prior to hospitalization Yes   Stroke Family Assessment   Stroke Family Assessment Needed No   Intensity of Service   Intensity of Service 0-30 min

## 2025-02-03 NOTE — PROGRESS NOTES
[facility] Hospitalist Progress Note       [id number]: Please page me for patient care issues.  [phone]: Please page night hospitalist for any issues.     Camelia Jones  :  1934(89 y.o.)  MRN:  [id number]  PCP: Trav Rodriguez MD  LOS: 1  day(s) since admission    Assessment & Plan  Hypernatremia      Assessment and Plan:     Camelia Jones is a 89 y.o. female from ECF with PMH HTN, HLD, chronic anemia, CKD IV, chronic LE edema, pancreatitis, RLE DVT with post thrombotic syndrome, hx sacral ala / L5 fx, chronic hyponatremia.     Patient presented from ECF with several days of worsening mentation.  Associated with poor p.o. intake.  Patient was recently diagnosed with UTI and pneumonia at facility initially placed on doxycycline and later transitioned to Invanz IM.  Currently on fourth day of Invanz and a total of 1 week of antibiotics prior to arrival in the ED.  Patient admitted to clear productive cough and fatigue.  Denies fever, rigor, chest pain, dyspnea, worsening lower extremity edema, dysuria, anuria.  Per family at bedside mentation has significantly improved following IVF in ED     In ED VS was significant for mild hypertension.  Labs were significant for hyponatremia (151), hyperchloremia (114), azotemia from baseline (78/3.71), elevated BNP (861), elevated troponin (42), negative leukocytosis, negative flu/RSV/COVID 19.  CT head was unrevealing for acute intracranial processes.  CT chest abdomen pelvis shows RML and RLL pneumonia, moderate L pleural effusion and trace R pleural effusion.  ECG shows no acute ST changes or malignant arrhythmia.  Patient was admitted for hypernatremia and WARREN.     # Acute metabolic encephalopathy  #Azotemia with suspected uremic syndrome (AMS/mild asterixis)  #WARREN/CKD IV  #Hypernatremia  #B/L pleural effusion (L>R) w/o hypoxia  #Recent RML/RLL pneumonia PTA  #Chronic LE edema  #Hx DVT  #Hx chronic hyponatremia  #Anemia of chronic disease  -PRN IVF  (D5W/0.45%NS) w/ caution 2/2 pleural effusion  -resume home invanz, started Vanco due to positive MRSA PCR  -home home antihypertensive and diuretics (torsemide) 2/2 WARREN  -will consider discontinuing home amlodipine as it can contribute to LE edema  -PT/OT  -Pleural fluid is too small to safely perform Thora (2/3/2025)  -CS notes reviewed and recs appreciated: Nephrology    Disposition: await test results, await consultant recommendations, await clinical improvement, and await placement    DVT Prophylaxis: Subq Heparin    Code status: DNR and No Intubation  Diet: Adult diet Regular    Level of MDM:  High    discussed with nurse, discussed with TCC/SW, patient and family updated, I personally examined the patient, and I personally reviewed chart, data, labs radiology reports    Family Communication  Number : Name of Designated Family Representative:      Total time spent: 35 minutes, of total time providing counseling or in coordination of care. Total time on this day of visit includes record and documentation review before and after visit including documentation and time not explicitly included on EMR time stamp      Subjective:   Interval History:  HPI  The patient complains of AMS  The patient feels their symptoms are slowly improving  no events or new concerns    Scheduled Meds:acetaminophen, 975 mg, oral, q8h  [Held by provider] amLODIPine, 5 mg, oral, Daily  aspirin, 81 mg, oral, Daily  B complex-vitamin C, 1 tablet, oral, Daily  benzonatate, 200 mg, oral, TID  carvedilol, 12.5 mg, oral, BID  cetirizine, 5 mg, oral, Daily  ertapenem, 500 mg, intravenous, q24h  [Held by provider] ferrous sulfate (325 mg ferrous sulfate), 65 mg of iron, oral, Daily  gabapentin, 100 mg, oral, Nightly  heparin (porcine), 5,000 Units, subcutaneous, q8h LATA  polyethylene glycol, 17 g, oral, BID  pravastatin, 20 mg, oral, Daily  sennosides-docusate sodium, 2 tablet, oral, BID  sodium bicarbonate, 650 mg, oral, BID  [Held by provider]  "torsemide, 10 mg, oral, Daily  vancomycin, 750 mg, intravenous, Once      Continuous Infusions:dextrose 5%-0.45 % sodium chloride, 75 mL/hr, Last Rate: 75 mL/hr (25 1103)      PRN Meds:PRN medications: dextromethorphan-guaifenesin, ipratropium-albuteroL, ondansetron, prochlorperazine, vancomycin    Review of Systems   Unable to perform ROS: Mental status change     Interval Pertinent History:  Social History     Tobacco Use    Smoking status: Never    Smokeless tobacco: Never   Substance Use Topics    Alcohol use: Never         Objective:   Patient Vitals for the past 24 hrs:   BP Temp Temp src Pulse Resp SpO2 Height Weight   25 0833 160/84 36.7 °C (98.1 °F) -- -- -- -- -- --   25 0600 -- -- -- -- -- -- -- 58.5 kg (129 lb)   25 0408 147/83 36.6 °C (97.9 °F) Temporal 82 18 94 % -- --   25 (!) 152/96 36.8 °C (98.2 °F) Temporal 79 18 92 % 1.575 m (5' 2.01\") 58.6 kg (129 lb 3 oz)   25 1700 144/88 -- -- 74 16 97 % -- --   25 1430 (!) 152/92 36.8 °C (98.2 °F) Temporal 76 17 96 % 1.575 m (5' 2\") 61.7 kg (136 lb)       Average, Min, and Max for last 24 hours Vitals:  TEMPERATURE:  Temp  Av.7 °C (98.1 °F)  Min: 36.6 °C (97.9 °F)  Max: 36.8 °C (98.2 °F)    RESPIRATIONS RANGE: Resp  Av.3  Min: 16  Max: 18    PULSE RANGE: Pulse  Av.8  Min: 74  Max: 82    BLOOD PRESSURE RANGE:  Systolic (24hrs), Av , Min:144 , Max:160   ; Diastolic (24hrs), Av, Min:83, Max:96      PULSE OXIMETRY RANGE: SpO2  Av.8 %  Min: 92 %  Max: 97 %  Body mass index is 23.59 kg/m².    No intake/output data recorded.  Weight change:      Physical Exam  Vitals and nursing note reviewed.   Constitutional:       Appearance: Normal appearance.   HENT:      Head: Normocephalic and atraumatic.      Right Ear: External ear normal.      Left Ear: External ear normal.      Nose: Nose normal.      Mouth/Throat:      Mouth: Mucous membranes are moist.   Eyes:      General: No scleral icterus.   "      Right eye: No discharge.         Left eye: No discharge.      Extraocular Movements: Extraocular movements intact.      Conjunctiva/sclera: Conjunctivae normal.      Pupils: Pupils are equal, round, and reactive to light.   Cardiovascular:      Rate and Rhythm: Normal rate and regular rhythm.   Pulmonary:      Effort: Pulmonary effort is normal.      Breath sounds: Rhonchi present.   Abdominal:      General: Abdomen is flat. Bowel sounds are normal.      Palpations: Abdomen is soft.   Musculoskeletal:         General: Normal range of motion.      Right lower leg: Edema present.      Left lower leg: Edema present.   Skin:     General: Skin is warm and dry.      Capillary Refill: Capillary refill takes less than 2 seconds.   Neurological:      General: No focal deficit present.      Mental Status: She is lethargic and confused.      Motor: Tremor (Mild asterixis) present.   Psychiatric:         Mood and Affect: Mood normal.         Thought Content: Thought content normal.         Judgment: Judgment normal.         Lab Results   Component Value Date     (H) 02/03/2025    K 4.1 02/03/2025     (H) 02/03/2025    CO2 24 02/03/2025    BUN 72 (H) 02/03/2025    CREATININE 3.47 (H) 02/03/2025    GLUCOSE 105 (H) 02/03/2025    CALCIUM 7.8 (L) 02/03/2025    PROT 6.8 02/03/2025    BILITOT 0.3 02/02/2025    ALKPHOS 74 02/02/2025    AST 19 02/02/2025    ALT 11 02/02/2025       Lab Results   Component Value Date    WBC 8.1 02/03/2025    HGB 11.9 (L) 02/03/2025    HCT 38.1 02/03/2025    MCV 98 02/03/2025     02/03/2025    LYMPHOPCT 10.0 02/03/2025    RBC 3.89 (L) 02/03/2025    MCH 30.6 02/03/2025    MCHC 31.2 (L) 02/03/2025    RDW 15.9 (H) 02/03/2025    CRP 5.73 (H) 02/03/2025       Lab Results   Component Value Date    TSH 1.40 02/02/2025     Lab Results   Component Value Date    LACTATE 0.5 09/19/2023     (H) 02/02/2025    INR 1.3 (H) 02/02/2025       IMAGES:  Encounter Date: 02/02/25   ECG 12 lead    Result Value    Ventricular Rate 75    Atrial Rate 75    IL Interval 194    QRS Duration 76    QT Interval 422    QTC Calculation(Bazett) 471    P Axis 59    R Axis -44    T Axis 103    QRS Count 12    Q Onset 219    P Onset 122    P Offset 160    T Offset 430    QTC Fredericia 454    Narrative    Normal sinus rhythm  Left axis deviation  Left ventricular hypertrophy with repolarization abnormality ( R in aVL )  Abnormal ECG  When compared with ECG of 15-SEP-2023 12:47,  Previous ECG has undetermined rhythm, needs review  Criteria for Septal infarct are no longer Present        No echocardiogram results found for the past 12 months  === 02/02/25 ===    XR CHEST 2 VIEWS    - Impression -  Small left pleural effusion.    MACRO:  None    Signed by: Nadia Malik 2/2/2025 4:54 PM  Dictation workstation:   [id number]  === 02/02/25 ===    CT CHEST ABDOMEN PELVIS WO CONTRAST    - Impression -  Chest  1.  Subtle patchy nodular airspace opacities are present in the  lingula, right middle lobe and to lesser extent left lower lobe.  Correlate with any symptoms of developing pneumonia.  2. Moderate left-sided and trace right-sided pleural effusion with  some atelectasis in the lower lobes.  3. Moderate coronary artery calcifications.    Abdomen-Pelvis  1.  Liquid stool is present throughout the colon without inflammatory  wall thickening. Correlate with any enterocolitis.  2. Scattered diverticula are present in the colon without evidence of  acute diverticulitis.  3. Fat containing right inguinal hernia.      MACRO:  None    Signed by: Rui aHyes 2/2/2025 5:53 PM  Dictation workstation:   [id number]  === 02/02/25 ===    XR CHEST 2 VIEWS    - Impression -  Small left pleural effusion.    MACRO:  None    Signed by: Nadia Malik 2/2/2025 4:54 PM  Dictation workstation:   [id number]  === 08/12/15 ===    - Impression -  There is extrahepatic biliary dilatation, with the common  duct measuring up to 9.7 mm.  No  choledocholithiasis seen, and on the  MRCP images there appears to be smooth tapering of the duct towards  the ampulla, but a degree of ampullary stenosis again is not  excluded.  There is also an area of kinking in the proximal common  duct after the cystic duct insertion, though this is felt to be  physiologic, and proximally the duct is less ectatic.    Minimal layering high density material in the gallbladder lumen may  represent bile.    Renal cysts.    Scattered prominent subcentimeter mesenteric lymph nodes are  nonspecific.    Additional results of the last 24 hours have been reviewed.    Dictated using [employer] Version 2.4  Proof read however unrecognized voice recognition errors may have occurred     Electronically signed by Bear Silveira DO on 02/03/25 at 11:51 AM

## 2025-02-03 NOTE — PROGRESS NOTES
Physical Therapy    Physical Therapy Evaluation    Patient Name: Camelia Jones  MRN: [id number]  Department: [address]  Room: [address] [place] [address]A  Today's Date: 2/3/2025   Time Calculation  Start Time: 1311  Stop Time: 1327  Time Calculation (min): 16 min    Assessment/Plan   PT Assessment  PT Assessment Results: Decreased cognition, Decreased mobility, Decreased strength, Impaired balance, Pain  Rehab Prognosis: Good  Barriers to Discharge Home: No anticipated barriers  Evaluation/Treatment Tolerance: Patient limited by pain (and confusion)  Medical Staff Made Aware: Yes  Strengths: Living arrangement secure, Premorbid level of function  Barriers to Participation: Comorbidities, Ability to acquire knowledge  End of Session Communication: Bedside nurse, PCT/NA/CTA  Assessment Comment: Pt. presents with significant confusion and poor tolerance for movement/mobilityd/t pain/fear/confusion which contribute to need for 2 person assist to complete basic mobility tasks.  She was unable to tolerate sitting at this time.  Dtr confirms that current mentation and moblity level is signficantly altered.  Recommend PT intervention during acute LOS and MODERATE INTENSITY PT INTERVENTION upon discharge.  End of Session Patient Position: Alarm on, Bed, 4 rail up (dtr present and attentive; RN notified.  CNA alerted to pt. incontinence of bowel/bladder and needing assistance)  IP OR SWING BED PT PLAN  Inpatient or Swing Bed: Inpatient  PT Plan  Treatment/Interventions: Bed mobility, Transfer training, Gait training, Therapeutic exercise, Therapeutic activity  PT Plan: Ongoing PT  PT Frequency: 3 times per week  PT Discharge Recommendations: Moderate intensity level of continued care  Equipment Recommended upon Discharge: Wheeled walker (owns)  PT Recommended Transfer Status: Assist x2  PT - OK to Discharge: Yes    Subjective   General Visit Information:  General  Reason for Referral: 89 yo female admit with worsening mentation- UTI/PNA;  referred to PT for impaired mobility/gait training  Referred By: Bear Silveira DO  Past Medical History Relevant to Rehab: PMH HTN, HLD, chronic anemia, CKD IV, chronic LE edema, pancreatitis, RLE DVT with post thrombotic syndrome, hx sacral ala / L5 fx, chronic hyponatremia.  Family/Caregiver Present: Yes  Caregiver Feedback: Dtr present, confirms PLOF  Prior to Session Communication: Bedside nurse  Patient Position Received: Bed, 4 rail up, Alarm on (Dtr present and very attentive)  Preferred Learning Style: verbal, visual, auditory  General Comment: Confusion, mildly agitated especially with movement;  needed encouragement to participate in therapy assessment.  Very limited in tolerance for mobility - RN notified  Home Living:  Home Living  Type of Home: Long Term Care facility  Home Layout: One level  Home Access: Level entry  Home Living Comments: [facility]  Prior Level of Function:  Prior Function Per Pt/Caregiver Report  Level of [place]: Independent with ADLs and functional transfers, Needs assistance with homemaking  Receives Help From:  (Staff)  ADL Assistance: Independent  Homemaking Assistance: Needs assistance (completed by facility)  Ambulatory Assistance: Independent (ambulates >150 ft with FWW or with wheelchair; able to complete stand/pivot transfers IND)  Prior Function Comments: Dtr states pt. was able to ambulate in facility with FWW and completed transfers in/out bed/wheelchair IND  Precautions:  Precautions  Medical Precautions: Fall precautions  Precautions Comment: Dtr reports no falls in over 1 year     Objective   Pain:  Pain Assessment  Pain Assessment: Duff-Baker FACES  Duff-Baker FACES Pain Rating: Hurts whole lot  Pain Location: Back (and BLE)  Pain Orientation: Right, Left  Pain Descriptors: Patient unable to describe  Pain Frequency: Intermittent (possibly confusion/fear related as pt. c/o pain with attempted mobility)  Pain Interventions: Repositioned, Emotional  support  Response to Interventions: Decrease in pain  Cognition:  Cognition  Overall Cognitive Status: Impaired (per dtr - current level of mentation significantly worse)  Orientation Level: Disoriented to place, Disoriented to time, Disoriented to situation    General Assessments:     Activity Tolerance  Endurance: Tolerates less than 10 min exercise, no significant change in vital signs  Early Mobility/Exercise Safety Screen: Proceed with mobilization - No exclusion criteria met    Static Sitting Balance  Static Sitting-Comment/Number of Minutes: unable to assess - unable to tolerate sitting at this time    Functional Assessments:  Bed Mobility  Bed Mobility: Yes  Bed Mobility 1  Bed Mobility 1: Supine to sitting  Level of Assistance 1: Dependent, +2  Bed Mobility Comments 1: initiated supine to sit - MAX VC required - unable to achieve sitting EOB d/t confusion and increased pain in back and BLE  Bed Mobility 2  Bed Mobility  2: Rolling right, Rolling left  Level of Assistance 2: Maximum assistance, Maximum verbal cues, Maximum tactile cues, +2  Bed Mobility Comments 2: tolerated partial rolling L+R  Bed Mobility 3  Bed Mobility 3: Scooting  Level of Assistance 3: Dependent, +2    Transfers  Transfer: No    Ambulation/Gait Training  Ambulation/Gait Training Performed: No       Extremity/Trunk Assessments:  RUE   RUE : Within Functional Limits  LUE   LUE: Within Functional Limits  RLE   RLE :  (difficult to accurately assess d/t mentation and decreased tolerance for movement - RN made aware)  LLE   LLE :  (difficult to accurately assess d/t mentation and decreased tolerance for movement - RN made aware)  Outcome Measures:  [facility] Basic Mobility  Turning from your back to your side while in a flat bed without using bedrails: A lot  Moving from lying on your back to sitting on the side of a flat bed without using bedrails: Total  Moving to and from bed to chair (including a wheelchair): Total  Standing up from a  chair using your arms (e.g. wheelchair or bedside chair): Total  To walk in hospital room: Total  Climbing 3-5 steps with railing: Total  Basic Mobility - Total Score: 7    Encounter Problems       Encounter Problems (Active)       Balance       STG - Maintains static sitting balance at EOB with upper extremity support modified IND x 10 min with SUPERVISION to progress toward PLOF        Start:  02/03/25    Expected End:  02/17/25       INTERVENTIONS:  1. Practice sitting on the edge of a bed/mat with minimal support.  2. Educate patient about maintining total hip precautions while maintaining balance.  3. Educate patient about pressure relief.  4. Educate patient about use of assistive device.            Mobility       STG - Patient will ambulate >100 ft with FWW SUPERVISION        Start:  02/03/25    Expected End:  02/17/25               PT Transfers       STG - Transfer from bed to chair with SUPERVISION        Start:  02/03/25    Expected End:  02/17/25            STG - Patient will perform bed mobility modified IND       Start:  02/03/25    Expected End:  02/17/25            STG - Patient will transfer sit to and from stand with FWW to/from various surfaces with SUPERVISION        Start:  02/03/25    Expected End:  02/17/25               Pain - Adult              Education Documentation  Mobility Training, taught by Abril Carey, PT at 2/3/2025  3:46 PM.  Learner: Family, Patient  Readiness: Acceptance  Method: Explanation  Response: No Evidence of Learning  Comment: No evidence of learning for patient d/t confusion.  Dtr demo understanding of progression of therapy, current mobility level, and recommendations from PT    Education Comments  No comments found.

## 2025-02-04 LAB
ALBUMIN SERPL BCP-MCNC: 2.6 G/DL (ref 3.4–5)
ANION GAP SERPL CALC-SCNC: 12 MMOL/L (ref 10–20)
BACTERIA UR CULT: NO GROWTH
BASOPHILS # BLD AUTO: 0.06 X10*3/UL (ref 0–0.1)
BASOPHILS NFR BLD AUTO: 0.6 %
BUN SERPL-MCNC: 62 MG/DL (ref 6–23)
CALCIUM SERPL-MCNC: 7.3 MG/DL (ref 8.6–10.3)
CHLORIDE SERPL-SCNC: 116 MMOL/L (ref 98–107)
CHLORIDE UR-SCNC: 63 MMOL/L
CHLORIDE/CREATININE (MMOL/G) IN URINE: 63 MMOL/G CREAT (ref 38–318)
CO2 SERPL-SCNC: 25 MMOL/L (ref 21–32)
CREAT SERPL-MCNC: 3.27 MG/DL (ref 0.5–1.05)
CREAT UR-MCNC: 100.1 MG/DL (ref 20–320)
CRP SERPL-MCNC: 5.67 MG/DL
EGFRCR SERPLBLD CKD-EPI 2021: 13 ML/MIN/1.73M*2
EOSINOPHIL # BLD AUTO: 0.28 X10*3/UL (ref 0–0.4)
EOSINOPHIL NFR BLD AUTO: 3 %
ERYTHROCYTE [DISTWIDTH] IN BLOOD BY AUTOMATED COUNT: 15.1 % (ref 11.5–14.5)
GLUCOSE SERPL-MCNC: 103 MG/DL (ref 74–99)
HCT VFR BLD AUTO: 39.7 % (ref 36–46)
HGB BLD-MCNC: 11.4 G/DL (ref 12–16)
IMM GRANULOCYTES # BLD AUTO: 0.07 X10*3/UL (ref 0–0.5)
IMM GRANULOCYTES NFR BLD AUTO: 0.7 % (ref 0–0.9)
LACTATE SERPL-SCNC: 0.9 MMOL/L (ref 0.4–2)
LYMPHOCYTES # BLD AUTO: 1 X10*3/UL (ref 0.8–3)
LYMPHOCYTES NFR BLD AUTO: 10.7 %
MAGNESIUM SERPL-MCNC: 2.19 MG/DL (ref 1.6–2.4)
MCH RBC QN AUTO: 29 PG (ref 26–34)
MCHC RBC AUTO-ENTMCNC: 28.7 G/DL (ref 32–36)
MCV RBC AUTO: 101 FL (ref 80–100)
MONOCYTES # BLD AUTO: 0.83 X10*3/UL (ref 0.05–0.8)
MONOCYTES NFR BLD AUTO: 8.9 %
NEUTROPHILS # BLD AUTO: 7.11 X10*3/UL (ref 1.6–5.5)
NEUTROPHILS NFR BLD AUTO: 76.1 %
NRBC BLD-RTO: 0 /100 WBCS (ref 0–0)
OSMOLALITY SERPL: 337 MOSM/KG (ref 280–300)
PHOSPHATE SERPL-MCNC: 4.6 MG/DL (ref 2.5–4.9)
PLATELET # BLD AUTO: 227 X10*3/UL (ref 150–450)
POTASSIUM SERPL-SCNC: 4.4 MMOL/L (ref 3.5–5.3)
POTASSIUM UR-SCNC: 28 MMOL/L
POTASSIUM/CREAT UR-RTO: 28 MMOL/G CREAT
PROCALCITONIN SERPL-MCNC: 0.74 NG/ML
RBC # BLD AUTO: 3.93 X10*6/UL (ref 4–5.2)
SODIUM SERPL-SCNC: 139 MMOL/L (ref 136–145)
SODIUM SERPL-SCNC: 149 MMOL/L (ref 136–145)
SODIUM UR-SCNC: 103 MMOL/L
SODIUM/CREAT UR-RTO: 103 MMOL/G CREAT
VANCOMYCIN SERPL-MCNC: 7.7 UG/ML (ref 5–20)
WBC # BLD AUTO: 9.4 X10*3/UL (ref 4.4–11.3)

## 2025-02-04 PROCEDURE — 2500000001 HC RX 250 WO HCPCS SELF ADMINISTERED DRUGS (ALT 637 FOR MEDICARE OP): Performed by: INTERNAL MEDICINE

## 2025-02-04 PROCEDURE — 82436 ASSAY OF URINE CHLORIDE: CPT | Mod: GEALAB | Performed by: INTERNAL MEDICINE

## 2025-02-04 PROCEDURE — 84295 ASSAY OF SERUM SODIUM: CPT | Performed by: INTERNAL MEDICINE

## 2025-02-04 PROCEDURE — 1200000002 HC GENERAL ROOM WITH TELEMETRY DAILY

## 2025-02-04 PROCEDURE — 80069 RENAL FUNCTION PANEL: CPT | Performed by: INTERNAL MEDICINE

## 2025-02-04 PROCEDURE — 36415 COLL VENOUS BLD VENIPUNCTURE: CPT | Performed by: INTERNAL MEDICINE

## 2025-02-04 PROCEDURE — 99223 1ST HOSP IP/OBS HIGH 75: CPT | Performed by: INTERNAL MEDICINE

## 2025-02-04 PROCEDURE — 2500000004 HC RX 250 GENERAL PHARMACY W/ HCPCS (ALT 636 FOR OP/ED): Performed by: INTERNAL MEDICINE

## 2025-02-04 PROCEDURE — 80202 ASSAY OF VANCOMYCIN: CPT | Performed by: INTERNAL MEDICINE

## 2025-02-04 PROCEDURE — 99232 SBSQ HOSP IP/OBS MODERATE 35: CPT | Performed by: INTERNAL MEDICINE

## 2025-02-04 PROCEDURE — 86140 C-REACTIVE PROTEIN: CPT | Performed by: INTERNAL MEDICINE

## 2025-02-04 PROCEDURE — 83735 ASSAY OF MAGNESIUM: CPT | Performed by: INTERNAL MEDICINE

## 2025-02-04 PROCEDURE — 85025 COMPLETE CBC W/AUTO DIFF WBC: CPT | Performed by: INTERNAL MEDICINE

## 2025-02-04 PROCEDURE — 84145 PROCALCITONIN (PCT): CPT | Mod: GEALAB | Performed by: INTERNAL MEDICINE

## 2025-02-04 RX ORDER — CEFEPIME HYDROCHLORIDE 1 G/50ML
1 INJECTION, SOLUTION INTRAVENOUS EVERY 24 HOURS
Status: DISCONTINUED | OUTPATIENT
Start: 2025-02-04 | End: 2025-02-10

## 2025-02-04 RX ORDER — VANCOMYCIN HYDROCHLORIDE 750 MG/150ML
750 INJECTION, SOLUTION INTRAVENOUS ONCE
Status: COMPLETED | OUTPATIENT
Start: 2025-02-04 | End: 2025-02-04

## 2025-02-04 RX ORDER — DEXTROSE MONOHYDRATE 50 MG/ML
100 INJECTION, SOLUTION INTRAVENOUS CONTINUOUS
Status: DISCONTINUED | OUTPATIENT
Start: 2025-02-04 | End: 2025-02-04

## 2025-02-04 RX ADMIN — CEFEPIME HYDROCHLORIDE 1 G: 1 INJECTION, SOLUTION INTRAVENOUS at 13:00

## 2025-02-04 RX ADMIN — SODIUM BICARBONATE 650 MG: 650 TABLET ORAL at 20:41

## 2025-02-04 RX ADMIN — POLYETHYLENE GLYCOL 3350 17 G: 17 POWDER, FOR SOLUTION ORAL at 20:41

## 2025-02-04 RX ADMIN — CETIRIZINE HYDROCHLORIDE 5 MG: 10 TABLET, FILM COATED ORAL at 09:46

## 2025-02-04 RX ADMIN — ASPIRIN 81 MG: 81 TABLET, COATED ORAL at 09:46

## 2025-02-04 RX ADMIN — BENZONATATE 200 MG: 100 CAPSULE ORAL at 09:47

## 2025-02-04 RX ADMIN — HEPARIN SODIUM 5000 UNITS: 5000 INJECTION INTRAVENOUS; SUBCUTANEOUS at 17:49

## 2025-02-04 RX ADMIN — BENZONATATE 200 MG: 100 CAPSULE ORAL at 20:41

## 2025-02-04 RX ADMIN — BENZONATATE 200 MG: 100 CAPSULE ORAL at 17:49

## 2025-02-04 RX ADMIN — DEXTROSE AND SODIUM CHLORIDE 75 ML/HR: 5; 450 INJECTION, SOLUTION INTRAVENOUS at 02:48

## 2025-02-04 RX ADMIN — VANCOMYCIN HYDROCHLORIDE 750 MG: 750 INJECTION, SOLUTION INTRAVENOUS at 09:46

## 2025-02-04 RX ADMIN — CARVEDILOL 12.5 MG: 12.5 TABLET, FILM COATED ORAL at 09:47

## 2025-02-04 RX ADMIN — DEXTROSE MONOHYDRATE 100 ML/HR: 5 INJECTION, SOLUTION INTRAVENOUS at 15:05

## 2025-02-04 RX ADMIN — SENNOSIDES AND DOCUSATE SODIUM 2 TABLET: 50; 8.6 TABLET ORAL at 09:47

## 2025-02-04 RX ADMIN — Medication 1 TABLET: at 09:49

## 2025-02-04 RX ADMIN — SENNOSIDES AND DOCUSATE SODIUM 2 TABLET: 50; 8.6 TABLET ORAL at 20:42

## 2025-02-04 RX ADMIN — CARVEDILOL 12.5 MG: 12.5 TABLET, FILM COATED ORAL at 20:42

## 2025-02-04 RX ADMIN — ACETAMINOPHEN 975 MG: 325 TABLET, FILM COATED ORAL at 20:42

## 2025-02-04 RX ADMIN — SODIUM BICARBONATE 650 MG: 650 TABLET ORAL at 09:46

## 2025-02-04 RX ADMIN — GABAPENTIN 100 MG: 100 CAPSULE ORAL at 20:42

## 2025-02-04 RX ADMIN — HEPARIN SODIUM 5000 UNITS: 5000 INJECTION INTRAVENOUS; SUBCUTANEOUS at 09:46

## 2025-02-04 RX ADMIN — PRAVASTATIN SODIUM 20 MG: 40 TABLET ORAL at 20:42

## 2025-02-04 ASSESSMENT — COGNITIVE AND FUNCTIONAL STATUS - GENERAL
TOILETING: A LOT
TOILETING: A LOT
DAILY ACTIVITIY SCORE: 17
MOVING TO AND FROM BED TO CHAIR: A LITTLE
TURNING FROM BACK TO SIDE WHILE IN FLAT BAD: A LITTLE
DAILY ACTIVITIY SCORE: 17
MOVING FROM LYING ON BACK TO SITTING ON SIDE OF FLAT BED WITH BEDRAILS: A LITTLE
DRESSING REGULAR LOWER BODY CLOTHING: A LITTLE
EATING MEALS: A LITTLE
WALKING IN HOSPITAL ROOM: A LITTLE
MOBILITY SCORE: 17
WALKING IN HOSPITAL ROOM: A LITTLE
DRESSING REGULAR LOWER BODY CLOTHING: A LITTLE
MOBILITY SCORE: 17
DRESSING REGULAR UPPER BODY CLOTHING: A LITTLE
PERSONAL GROOMING: A LITTLE
DRESSING REGULAR UPPER BODY CLOTHING: A LITTLE
TURNING FROM BACK TO SIDE WHILE IN FLAT BAD: A LITTLE
CLIMB 3 TO 5 STEPS WITH RAILING: A LOT
MOVING TO AND FROM BED TO CHAIR: A LITTLE
PERSONAL GROOMING: A LITTLE
STANDING UP FROM CHAIR USING ARMS: A LITTLE
CLIMB 3 TO 5 STEPS WITH RAILING: A LOT
HELP NEEDED FOR BATHING: A LITTLE
HELP NEEDED FOR BATHING: A LITTLE
STANDING UP FROM CHAIR USING ARMS: A LITTLE
EATING MEALS: A LITTLE
MOVING FROM LYING ON BACK TO SITTING ON SIDE OF FLAT BED WITH BEDRAILS: A LITTLE

## 2025-02-04 ASSESSMENT — PAIN SCALES - GENERAL
PAINLEVEL_OUTOF10: 0 - NO PAIN
PAINLEVEL_OUTOF10: 0 - NO PAIN

## 2025-02-04 ASSESSMENT — PAIN - FUNCTIONAL ASSESSMENT: PAIN_FUNCTIONAL_ASSESSMENT: 0-10

## 2025-02-04 NOTE — CONSULTS
Reason For Consult  Hyponatremia/acute kidney injury    History Of Present Illness  Camelia Jones is a 89 y.o. female  from ECF with PMH HTN, HLD, chronic anemia, CKD IV, chronic LE edema, pancreatitis, RLE DVT with post thrombotic syndrome, hx sacral ala / L5 fx, chronic hyponatremia who was admitted with altered mental status associated with poor p.o. fluid intake.  Nephrology team was consulted to manage hypernatremia    Patient was seen and examined in her room today.  She is confused.  Not able to give history.  Most of history per chart review     Patient presented from ECF with several days of worsening mentation.  Associated with poor p.o. intake.  Patient was recently diagnosed with UTI and pneumonia at facility initially placed on doxycycline and later transitioned to Invanz IM.  On presentation, patient admitted to clear productive cough and fatigue.  Denies fever, rigor, chest pain, dyspnea, worsening lower extremity edema, dysuria, anuria.  Per family at bedside mentation has significantly improved following IVF in ED     In ED VS was significant for mild hypertension.  Labs were significant for hyper natremia (151), hyperchloremia (114), azotemia from baseline (78/3.71), elevated BNP (861), elevated troponin (42), negative leukocytosis, negative flu/RSV/COVID 19.  CT head was unrevealing for acute intracranial processes.  CT chest abdomen pelvis shows RML and RLL pneumonia, moderate L pleural effusion and trace R pleural effusion.  ECG shows no acute ST changes or malignant arrhythmia.  Patient was admitted for hypernatremia and WARREN.        Past Medical History  She has a past medical history of Other conditions influencing health status, Other constipation, Pain in unspecified hip, Personal history of other diseases of the circulatory system, Personal history of other diseases of the respiratory system, Personal history of other endocrine, nutritional and metabolic disease, Personal history of other  medical treatment (11/14/2019), and Personal history of other mental and behavioral disorders.    Surgical History  She has a past surgical history that includes Other surgical history (05/07/2013); Appendectomy (05/07/2013); Total abdominal hysterectomy (05/07/2013); Other surgical history (05/07/2013); and Cholecystectomy (11/30/2015).     Social History  She reports that she has never smoked. She has never used smokeless tobacco. She reports that she does not drink alcohol and does not use drugs.    Family History  No family history on file.     Allergies  Azithromycin, Ciprofloxacin, Codeine, Macrolide antibiotics, Oxycodone, Penicillins, Sulfa (sulfonamide antibiotics), and Tramadol    Review of Systems   NA       Physical Exam      General appearance: Elderly, confused, hypervolemic on exam  Eyes: non-icteric  HEENT: atrumatic head, PEERLA, dry mucosa  Skin: no apparent rash  Heart: NSR, S1, S2 normal, no murmur or gallop  Lungs: Symmetrical expansion,CTA bilat no wheezing/crackles  Abdomen: soft, nt/nd, obese  Extremities: no edema bilat  Neuro: No FND,asterixis, no focal deficits noticed           I&O 24HR    Intake/Output Summary (Last 24 hours) at 2/4/2025 1005  Last data filed at 2/4/2025 0900  Gross per 24 hour   Intake 1628.75 ml   Output 130 ml   Net 1498.75 ml       Vitals 24HR  Heart Rate:  [68-81]   Temp:  [35.9 °C (96.7 °F)-37.4 °C (99.3 °F)]   Resp:  [16-20]   BP: (101-142)/(79-89)   Weight:  [60.5 kg (133 lb 6.1 oz)]   SpO2:  [98 %]         Relevant Results  RFP  Recent Labs     02/04/25  0653 02/03/25  1419 02/03/25  0609 02/02/25  1947 02/02/25  1536 09/21/23  0536 09/20/23  0524 09/19/23  0958 09/19/23  0513 09/18/23  1120 09/18/23  0507 09/18/23  0506   * 149* 150* 149* 151* 128* 129*   < > 126*   < > CANCELED 121*   K 4.4 4.1 4.1 4.3 4.4 4.3 4.2   < > 4.4   < > CANCELED 4.6   * 114* 115* 116* 114* 99 100   < > 97*   < > CANCELED 93*   CO2 25 24 24 20* 25 18* 20*   < > 20*   < >  CANCELED 19*   BUN 62* 70* 72* 76* 78* 32* 34*   < > 32*   < > CANCELED 34*   CREATININE 3.27* 3.35* 3.47* 3.63* 3.71* 1.93* 1.98*   < > 1.91*   < > CANCELED 1.90*   GLUCOSE 103* 124* 105* 109* 97 88 97   < > 94   < > CANCELED 94   CALCIUM 7.3* 7.7* 7.8* 7.6* 8.1* 8.1* 7.7*   < > 7.7*   < > CANCELED 7.6*   ALBUMIN 2.6*  --  3.1*  --  3.5 2.7* 2.6*  --  2.7*  --  CANCELED 2.8*   PHOS 4.6  --  4.8  --   --  3.4 3.6  --  3.8  --  CANCELED 3.7   EGFR 13* 13* 12* 11* 11*  --   --   --   --   --   --   --    ANIONGAP 12 15 15 17 16 15 13   < > 13   < > CANCELED 14    < > = values in this interval not displayed.        Urineanalysis  Recent Labs     02/02/25  1609 09/15/23  1700 09/15/23  1633 09/03/23  1050 10/26/22  1057 09/13/22  1352 08/10/20  1226   COLORU Light-Yellow YELLOW CANCELED YELLOW YELLOW YELLOW YELLOW   APPEARANCEU Clear HAZY CANCELED HAZY HAZY CLEAR HAZY   SPECGRAVU 1.017 1.017 CANCELED 1.016 1.017 >1.030* 1.012   BASHIR 5.5 8.0 CANCELED 6.0 5.0 5.5 5.0   PROTUR 200 (2+)* >=500(3+)* CANCELED >=500(3+)* 100(2+)* >300* NEGATIVE   GLUCOSEU Normal NEGATIVE CANCELED NEGATIVE NEGATIVE NEGATIVE NEGATIVE   BLOODU 0.03 (TRACE)* NEGATIVE CANCELED NEGATIVE NEGATIVE NEGATIVE NEGATIVE   KETONESU NEGATIVE NEGATIVE CANCELED NEGATIVE NEGATIVE NEGATIVE NEGATIVE   BILIRUBINU NEGATIVE NEGATIVE CANCELED NEGATIVE NEGATIVE NEGATIVE NEGATIVE   NITRITEU NEGATIVE NEGATIVE CANCELED NEGATIVE NEGATIVE NEGATIVE NEGATIVE   LEUKOCYTESU 25 Mirella/uL* LARGE(3+)* CANCELED NEGATIVE NEGATIVE NEGATIVE TRACE*       Urine Electrolytes  Recent Labs     02/02/25  1609 09/15/23  1700 09/15/23  1637 09/15/23  1633 09/03/23  1050 10/26/22  1057 10/25/22  1127 09/13/22  1352 08/10/20  1226 05/16/19  1241 11/19/18  1423 01/23/18  1402 10/20/17  1047   SODIUMURR  --  48 CANCELED  --   --   --  42 98  --   --   --   --  77   NACREATUR  --  72 CANCELED  --   --   --  34 97  --   --   --   --  59   KUR  --  42  --   --   --   --   --   --   --   --   --   --    "--    KCREATUR  --  63  --   --   --   --   --   --   --   --   --   --   --    CREATU  --  66.4 CANCELED  --   --   --  124.0 101.0 55.1 79.4  --  140.0 130.0   PROTUR 200 (2+)* >=500(3+)*  --  CANCELED >=500(3+)* 100(2+)*  --  >300* NEGATIVE NEGATIVE   < > 100 (2+)* 30 (1+)*   MICROALBCREA  --   --   --   --   --   --   --   --  34.3* 96.0*  --  351.6*  --     < > = values in this interval not displayed.        Urine Micro  Recent Labs     02/02/25  1609 09/15/23  1700 09/03/23  1050 10/26/22  1057 08/10/20  1226 05/16/19  1241 11/19/18  1423 01/23/18  1402 10/20/17  1047   WBCU 6-10* >182* 3 1 4 2 2 67* 69*   RBCU 6-10* 10* 1 <1 1 None 1 3 1   HYALCASTU 2+*  --   --   --  2+*  --   --   --  1+*   SQUAMEPIU 1-9 (SPARSE)  --  <1  --   --   --  1 <1  --    BACTERIAU 1+*  --  1+*  --   --   --  1+* 3+* 3+*   MUCUSU  --   --  FEW FEW  --   --  1+ 1+ 1+        Iron  Recent Labs     08/21/23  1420 06/19/23  0836 09/06/22  0948 11/04/21  1126 05/12/21  1136 02/15/18  1002 11/17/17  1023   IRON 36  --  102 108 53 100 121   TIBC 210*  --  268 242 240 258 236*   IRONSAT 17*  --  38 45 22* 39 51*   FERRITIN 1,113* 1,093*  --   --   --   --   --        @Mg@    Lab Results   Component Value Date    WBC 9.4 02/04/2025    HGB 11.4 (L) 02/04/2025    HCT 39.7 02/04/2025     (H) 02/04/2025     02/04/2025       No results found for: \"CKTOTAL\", \"CKMB\", \"CKMBINDEX\", \"TROPONINI\"    Albumin/Creatine Ratio   Date Value Ref Range Status   08/10/2020 34.3 (H) 0.0 - 30.0 ug/mg crt Final   05/16/2019 96.0 (H) 0.0 - 30.0 ug/mg crt Final   01/23/2018 351.6 (H) 0.0 - 30.0 ug/mg crt Final     acetaminophen, 975 mg, oral, q8h  [Held by provider] amLODIPine, 5 mg, oral, Daily  aspirin, 81 mg, oral, Daily  B complex-vitamin C, 1 tablet, oral, Daily  benzonatate, 200 mg, oral, TID  carvedilol, 12.5 mg, oral, BID  cetirizine, 5 mg, oral, Daily  ertapenem, 500 mg, intravenous, q24h  [Held by provider] ferrous sulfate (325 mg ferrous " sulfate), 65 mg of iron, oral, Daily  gabapentin, 100 mg, oral, Nightly  heparin (porcine), 5,000 Units, subcutaneous, q8h LATA  polyethylene glycol, 17 g, oral, BID  pravastatin, 20 mg, oral, Daily  sennosides-docusate sodium, 2 tablet, oral, BID  sodium bicarbonate, 650 mg, oral, BID  [Held by provider] torsemide, 10 mg, oral, Daily  vancomycin, 750 mg, intravenous, Once           Assessment/Plan       Camelia Jones is a 89 y.o. female  from ECF with PMH HTN, HLD, chronic anemia, CKD IV, chronic LE edema, pancreatitis, RLE DVT with post thrombotic syndrome, hx sacral ala / L5 fx, chronic hyponatremia who was admitted with altered mental status associated with poor p.o. fluid intake.  Nephrology team was consulted to manage hypernatremia    Assessment & Plan  Hypernatremia    # Chronic kidney disease stage IV most likely atherosclerotic cardiovascular disease.  Baseline serum creatinine 2.5-3    # Oliguric acute kidney injury with hypernatremia concerning for dehydration    # Recent pneumonia-currently on antibiotics-vancomycin    # Hypertension-currently on carvedilol 12.5 mg twice daily.  Amlodipine and torsemide are on hold    Recommendation plan  -Hypernatremia most like related to volume depletion/dehydration.  Will start D5W 100 cc/h.  Monitor serum sodium every 8 hours  -Agree to continue to hold diuretics and amlodipine  -Pending urine electrolytes and serum osmolarity  -Strict I's/O    Plan was discussed with primary team  I spent  minutes in the professional and overall care of this patient.      Abbie Galvan MD

## 2025-02-04 NOTE — CARE PLAN
The patient's goals for the shift include  use call light for assistance     The clinical goals for the shift include Patient will remain comfortable through out shift

## 2025-02-04 NOTE — CARE PLAN
As sodium has trended down to 139 this afternoon, from 149 this morning, we will hold IV fluids at this time due to the rate of decrease in sodium in this timeframe.  Repeat sodium again this evening and monitor.    Pj Woods D.O.

## 2025-02-04 NOTE — DOCUMENTATION CLARIFICATION NOTE
PATIENT:               NIKI LUNA  ACCT #:                  [id number]  MRN:                       [id number]  :                       1934  ADMIT DATE:       2025 2:13 PM  DISCH DATE:  RESPONDING PROVIDER #:        [id number]          PROVIDER RESPONSE TEXT:    WARREN on CKD stage 4    CDI QUERY TEXT:    Clarification      Instruction:    Based on your assessment of the patient and the clinical information, please provide the requested documentation by clicking on the appropriate radio button and enter any additional information if prompted.      Question: Based on your medical judgment, can you please clarify which of these conditions is the most clinically supported      When answering this query, please exercise your independent professional judgment. The fact that a question is being asked, does not imply that any particular answer is desired or expected.    The patient's clinical indicators include:  Clinical Information:  89 y/o [place] [facility] [place] difficulty [place]. DX Pneumonia.      Documented Diagnoses: There is conflicting documentation in the medical record which requires clarification.  - 25 ED note per NOEL Barahona PA-C: ?89-year-old female with PMH CKD stage V?    - 25 Nephrology consult per DIMITRI Galvan MD: ?Chronic kidney disease stage IV most likely atherosclerotic cardiovascular disease. Baseline serum creatinine 2.5-3? / ?Oliguric acute kidney injury?      Clinical Indicators:  ED: T 36.8, HR 76, RR 17, SpO2 96, 152/92    Labs  - :  Creatinine 3.71 - 3.63 - 3.47 - 3.35 - 3.27  GFR  -  - 12 - 13 - 13  BUN 78 - 76 - 72 - 70 - 62      Treatment: IVF Bolus NS , Nephrology consult  Risk Factors: PMH: HTN, CKD, HLD, Anemia  Options provided:  -- WARREN on CKD stage 4  -- WARREN on CKD stage 5  -- Other - I will add my own diagnosis  -- Refer to Clinical Documentation Reviewer    Query created by: Melanie Bledsoe on 2025 2:38  PM      Electronically signed by:  JESU FANG DO 2/4/2025 2:54 PM

## 2025-02-04 NOTE — DOCUMENTATION CLARIFICATION NOTE
PATIENT:               NIKI LUNA  ACCT #:                  [id number]  MRN:                       [id number]  :                       1934  ADMIT DATE:       2025 2:13 PM  DISCH DATE:  RESPONDING PROVIDER #:        [id number]          PROVIDER RESPONSE TEXT:    Possible MRSA versus gram-negative pneumonia.    CDI QUERY TEXT:    Clarification        Instruction:    Based on your assessment of the patient and the clinical information, please provide the requested documentation by clicking on the appropriate radio button and enter any additional information if prompted.      Question: Please further specify the type of pneumonia being treated      When answering this query, please exercise your independent professional judgment. The fact that a question is being asked, does not imply that any particular answer is desired or expected.    The patient's clinical indicators include:  Clinical Information:  89 y/o female presents to [facility] [place] from [place] c/o difficulty urinating. DX Pneumonia.      Clinical Indicators:  25 ID consult per ROBERT Miller MD: ?Pneumonia / effusion / atelectasis?  25 H&P note per PABLO Silveira, DO: ?Patient was recently diagnosed with UTI and pneumonia at facility initially placed on doxycycline and later transitioned to Invanz IM. Currently on fourth day of Invanz and a total of 1 week of antibiotics prior to arrival in the ED.?      ED: T 36.8, HR 76, RR 17, SpO2 96, 152/92  Per ED  ?Coarse breath sounds at the bases?     MRSA PCR - Detected    25 CT C/A/P ?IMPRESSION: Chest 1.  Subtle patchy nodular airspace opacities are present in the lingula, right middle lobe and to lesser extent left lower lobe. Correlate with any symptoms of developing pneumonia.?      Treatment: IV Cefepime , IV Ertapenem  - , IV Vancomycin  -   Risk Factors: PMH: HTN, CKD, HLD, Anemia  Options provided:  -- Gram Negative PNA  -- Pseudomonas PNA  -- MRSA PNA  --  Other - I will add my own diagnosis  -- Refer to Clinical Documentation Reviewer    Query created by: Melanie Bledsoe on 2/4/2025 2:43 PM      Electronically signed by:  JESU FANG DO 2/4/2025 2:54 PM

## 2025-02-04 NOTE — CONSULTS
Consults  Referred by GILBERT Woods    Primary MD: Trav Rodriguez MD    Reason For Consult  pneumonia    History Of Present Illness  Camelia Jones is a 89 y.o. female, hx of HTN, hx of CKD, hx of DVT, she has been treated pneumonia / UTI with intramuscular injection, she was sent to the hospital for altered mentation, weakness, having more cough, no fever, no emesis, had some diarrhea, ct showed effusion Lt > Rt, Lt base infiltrate, Rt sided infiltrate, ct head without acute changes, the WBC are N, the viral screen is negative, the MRSA screen is positive, she is unable to provide a hx      Past Medical History  She has a past medical history of Other conditions influencing health status, Other constipation, Pain in unspecified hip, Personal history of other diseases of the circulatory system, Personal history of other diseases of the respiratory system, Personal history of other endocrine, nutritional and metabolic disease, Personal history of other medical treatment (11/14/2019), and Personal history of other mental and behavioral disorders.    Surgical History  She has a past surgical history that includes Other surgical history (05/07/2013); Appendectomy (05/07/2013); Total abdominal hysterectomy (05/07/2013); Other surgical history (05/07/2013); and Cholecystectomy (11/30/2015).     Social History     Occupational History    Not on file   Tobacco Use    Smoking status: Never    Smokeless tobacco: Never   Vaping Use    Vaping status: Not on file   Substance and Sexual Activity    Alcohol use: Never    Drug use: Never    Sexual activity: Defer     Travel History   Travel since 01/04/25    No documented travel since 01/04/25            Family History  No family history on file., no immunodeficiency  Allergies  Azithromycin, Ciprofloxacin, Codeine, Macrolide antibiotics, Oxycodone, Penicillins, Sulfa (sulfonamide antibiotics), and Tramadol     Immunization History   Administered Date(s) Administered    Flu  vaccine, quadrivalent, high-dose, preservative free, age 65y+ (FLUZONE) 11/05/2020, 08/30/2022    Flu vaccine, trivalent, preservative free, HIGH-DOSE, age 65y+ (Fluzone) 10/28/2013, 08/29/2014, 11/21/2016, 11/28/2017, 11/19/2018, 11/14/2019    Influenza, seasonal, injectable 11/30/2015    [facility] SARS-CoV-2 Vaccination 05/24/2021, 12/04/2021    Pneumococcal Conjugate PCV 7 08/31/1934    Pneumococcal conjugate vaccine, 13-valent (PREVNAR 13) 02/27/2015    Pneumococcal polysaccharide vaccine, 23-valent, age 2 years and older (PNEUMOVAX 23) 09/22/2007, 11/01/2021    Tdap vaccine, age 7 year and older (BOOSTRIX, ADACEL) 11/30/2015    Zoster, live 10/28/2013   Pneumonia vaccine is up to date, influenza vaccine is planned  Knight fall risk 60, preventive protocol was implemented  Depression screen is negative    Medications  Home medications:  Medications Prior to Admission   Medication Sig Dispense Refill Last Dose/Taking    acetaminophen (Tylenol 8 HOUR) 650 mg ER tablet Take 1 tablet (650 mg) by mouth early in the morning.. Do not crush, chew, or split. Takes at 8am       acetaminophen (Tylenol) 500 mg tablet Take 1 tablet (500 mg) by mouth 2 times a day.       albuterol 2.5 mg /3 mL (0.083 %) nebulizer solution Take 3 mL (2.5 mg) by nebulization every 8 hours if needed for wheezing.       amLODIPine (Norvasc) 5 mg tablet Take by mouth once daily.       aspirin 81 mg EC tablet Take 1 tablet (81 mg) by mouth once daily.       benzonatate (Tessalon) 100 mg capsule Take 1 capsule (100 mg) by mouth 3 times a day. Do not crush or chew.       biotin 1 mg tablet Take 1 tablet (1 mg) by mouth.       calcium carbonate (Oscal) 500 mg calcium (1,250 mg) tablet Take 250 mg by mouth. (Patient not taking: Reported on 2/2/2025)   Not Taking    carvedilol (Coreg) 12.5 mg tablet Take 1 tablet (12.5 mg) by mouth 2 times a day.       cetirizine (ZyrTEC) 10 mg chewable tablet Chew 1 tablet (10 mg) once daily.       cyanocobalamin (Vitamin  B-12) 1,000 mcg/mL injection Inject 1,000 mL (1,000,000 mcg) into the muscle every 14 (fourteen) days.       dextromethorphan-guaifenesin (Mucinex DM)  mg 12 hr tablet Take 1 tablet by mouth every 12 hours. Do not crush, chew, or split.       docusate sodium (Colace) 100 mg capsule Take 1 capsule (100 mg) by mouth 2 times a day.       ertapenem (INVanz) 1 gram injection Infuse 1 g into a venous catheter once daily. For 5 days       gabapentin (Neurontin) 100 mg capsule Take 1 capsule (100 mg) by mouth once daily at bedtime.       KRILL OIL ORAL Take 1,000 mg by mouth.       lidocaine (Lidoderm) 5 % patch Place 1 patch on the skin once daily. Remove & discard patch within 12 hours or as directed by MD.       omega-3 acid ethyl esters (Lovaza) 1 gram capsule Take 1 capsule (1 g) by mouth once daily.       ondansetron (Zofran) 4 mg tablet Take 1 tablet (4 mg) by mouth every 6 hours if needed for nausea or vomiting.       polyethylene glycol (Miralax) 17 gram/dose powder as directed Orally       pravastatin (Pravachol) 20 mg tablet Take 1 tablet (20 mg) by mouth once daily.       sennosides (Senokot) 8.6 mg tablet Take 1 tablet (8.6 mg) by mouth 2 times a day.       sodium bicarbonate 650 mg tablet Take 1 tablet (650 mg) by mouth 2 times a day.       torsemide (Demadex) 20 mg tablet Take 0.5 tablets (10 mg) by mouth once daily.       TURMERIC ORAL Take 1,000 mg by mouth 2 times a day.       vitamin E 180 mg (400 unit) capsule Take 1 capsule (400 Units) by mouth once daily.       zinc gluconate 50 mg tablet 1 (one) time each day at the same time.        Current medications:  Scheduled medications  acetaminophen, 975 mg, oral, q8h  [Held by provider] amLODIPine, 5 mg, oral, Daily  aspirin, 81 mg, oral, Daily  B complex-vitamin C, 1 tablet, oral, Daily  benzonatate, 200 mg, oral, TID  carvedilol, 12.5 mg, oral, BID  cetirizine, 5 mg, oral, Daily  ertapenem, 500 mg, intravenous, q24h  [Held by provider] ferrous  sulfate (325 mg ferrous sulfate), 65 mg of iron, oral, Daily  gabapentin, 100 mg, oral, Nightly  heparin (porcine), 5,000 Units, subcutaneous, q8h LATA  polyethylene glycol, 17 g, oral, BID  pravastatin, 20 mg, oral, Daily  sennosides-docusate sodium, 2 tablet, oral, BID  sodium bicarbonate, 650 mg, oral, BID  [Held by provider] torsemide, 10 mg, oral, Daily  vancomycin, 750 mg, intravenous, Once      Continuous medications  dextrose 5%-0.45 % sodium chloride, 75 mL/hr, Last Rate: 75 mL/hr (02/04/25 0248)      PRN medications  PRN medications: dextromethorphan-guaifenesin, ipratropium-albuteroL, ondansetron, prochlorperazine, vancomycin    Review of Systems   Unable to perform ROS: Mental status change        Objective  Range of Vitals (last 24 hours)  Heart Rate:  [68-81]   Temp:  [35.9 °C (96.7 °F)-37.4 °C (99.3 °F)]   Resp:  [16-20]   BP: (101-142)/(79-89)   Weight:  [60.5 kg (133 lb 6.1 oz)]   SpO2:  [98 %]   Daily Weight  02/04/25 : 60.5 kg (133 lb 6.1 oz)    Body mass index is 24.39 kg/m².     Physical Exam  Constitutional:       Appearance: Normal appearance.   HENT:      Head: Normocephalic and atraumatic.      Mouth/Throat:      Mouth: Mucous membranes are moist.      Pharynx: Oropharynx is clear.   Eyes:      Pupils: Pupils are equal, round, and reactive to light.   Cardiovascular:      Rate and Rhythm: Normal rate and regular rhythm.      Heart sounds: Normal heart sounds.   Pulmonary:      Effort: Pulmonary effort is normal.      Breath sounds: Rales present.   Abdominal:      General: Abdomen is flat. Bowel sounds are normal.      Palpations: Abdomen is soft.   Musculoskeletal:         General: Swelling present.      Cervical back: Normal range of motion.          Relevant Results  Outside Hospital Results  reviewed  Labs  Results from last 72 hours   Lab Units 02/04/25  0653 02/03/25  0610 02/02/25  1536   WBC AUTO x10*3/uL 9.4 8.1 8.6   HEMOGLOBIN g/dL 11.4* 11.9* 12.5   HEMATOCRIT % 39.7 38.1 40.3  "  PLATELETS AUTO x10*3/uL 227 271 285   NEUTROS PCT AUTO % 76.1 78.3 81.9   LYMPHS PCT AUTO % 10.7 10.0 9.9   MONOS PCT AUTO % 8.9 8.1 5.5   EOS PCT AUTO % 3.0 2.1 1.4     Results from last 72 hours   Lab Units 02/04/25  0653 02/03/25  1419 02/03/25  0609   SODIUM mmol/L 149* 149* 150*   POTASSIUM mmol/L 4.4 4.1 4.1   CHLORIDE mmol/L 116* 114* 115*   CO2 mmol/L 25 24 24   BUN mg/dL 62* 70* 72*   CREATININE mg/dL 3.27* 3.35* 3.47*   GLUCOSE mg/dL 103* 124* 105*   CALCIUM mg/dL 7.3* 7.7* 7.8*   ANION GAP mmol/L 12 15 15   EGFR mL/min/1.73m*2 13* 13* 12*   PHOSPHORUS mg/dL 4.6  --  4.8     Results from last 72 hours   Lab Units 02/04/25  0653 02/03/25  0609 02/02/25  1536   ALK PHOS U/L  --   --  74   BILIRUBIN TOTAL mg/dL  --   --  0.3   PROTEIN TOTAL g/dL  --  6.8 6.7   ALT U/L  --   --  11   AST U/L  --   --  19   ALBUMIN g/dL 2.6* 3.1* 3.5     Estimated Creatinine Clearance: 9.8 mL/min (A) (by C-G formula based on SCr of 3.27 mg/dL (H)).  C-Reactive Protein   Date Value Ref Range Status   02/04/2025 5.67 (H) <1.00 mg/dL Final   02/03/2025 5.73 (H) <1.00 mg/dL Final   02/02/2025 4.75 (H) <1.00 mg/dL Final     No results found for: \"HIV1X2\", \"HIVCONF\", \"[id number]\"  No results found for: \"HEPCABINIT\", \"HEPCAB\", \"HCVPCRQUANT\"  Microbiology  Reviewed  Imaging  Reviewed      Assessment/Plan   Pneumonia / effusion / atelectasis  Pyuria  Encephalopathy    Recommendations :  Cefepime and Vancomycin  Cultures  Chest PT  Aspiration precautions    I spent minutes in the professional and overall care of this patient.      Kostas Miller MD  "

## 2025-02-04 NOTE — PROGRESS NOTES
"  Subjective    Patient denies chest pain or other acute complaints.  Patient's daughter is at the bedside and states that patient still appears confused.    Objective    Vitals  Visit Vitals  /87   Pulse 72   Temp 36.6 °C (97.9 °F) (Tympanic)   Resp 16   Ht 1.575 m (5' 2.01\")   Wt 60.5 kg (133 lb 6.1 oz)   SpO2 96%   BMI 24.39 kg/m²   OB Status Postmenopausal   Smoking Status Never   BSA 1.63 m²       Physical Exam   General: Alert and oriented to self, birthday, age, month, president, year.  No acute distress.  HEENT: Sclera clear.  CVS: RRR.   Lungs: Anteriorly auscultated and CTAB.   Abdomen: Soft.  Nontender.  Bowel sounds present.  Extremities: Trace edema left ankle.  No significant pitting edema right toe.  Psychiatric: Cooperative.     IOs    Intake/Output Summary (Last 24 hours) at 2/4/2025 1734  Last data filed at 2/4/2025 1437  Gross per 24 hour   Intake 2380.25 ml   Output 210 ml   Net 2170.25 ml       Labs:   Results from last 72 hours   Lab Units 02/04/25  1613 02/04/25  0653 02/03/25  1419 02/03/25  0609   SODIUM mmol/L 139 149* 149* 150*   POTASSIUM mmol/L  --  4.4 4.1 4.1   CHLORIDE mmol/L  --  116* 114* 115*   CO2 mmol/L  --  25 24 24   BUN mg/dL  --  62* 70* 72*   CREATININE mg/dL  --  3.27* 3.35* 3.47*   GLUCOSE mg/dL  --  103* 124* 105*   CALCIUM mg/dL  --  7.3* 7.7* 7.8*   ANION GAP mmol/L  --  12 15 15   EGFR mL/min/1.73m*2  --  13* 13* 12*   PHOSPHORUS mg/dL  --  4.6  --  4.8      Results from last 72 hours   Lab Units 02/04/25  0653 02/03/25  0610 02/02/25  1536   WBC AUTO x10*3/uL 9.4 8.1 8.6   HEMOGLOBIN g/dL 11.4* 11.9* 12.5   HEMATOCRIT % 39.7 38.1 40.3   PLATELETS AUTO x10*3/uL 227 271 285   NEUTROS PCT AUTO % 76.1 78.3 81.9   LYMPHS PCT AUTO % 10.7 10.0 9.9   MONOS PCT AUTO % 8.9 8.1 5.5   EOS PCT AUTO % 3.0 2.1 1.4      Lab Results   Component Value Date    CALCIUM 7.3 (L) 02/04/2025    PHOS 4.6 02/04/2025      Lab Results   Component Value Date    CRP 5.67 (H) 02/04/2025    "   [unfilled]       Images  US chest  Narrative: Interpreted By:  Dionne Garcia,   STUDY:  US CHEST; ;  2/3/2025 10:23 am      INDICATION:  Signs/Symptoms:left pleural effusion.          COMPARISON:  None.      ACCESSION NUMBER(S):  [id number]      ORDERING CLINICIAN:  PRATIK ROWLEY      TECHNIQUE:  Limited ultrasound evaluation of the left chest cavity was obtained.      FINDINGS:  Limited ultrasound evaluation of the left chest cavity was obtained.  There is trace amount of pleural effusion identified.      Impression: Trace amount of left pleural effusion, insufficient for  ultrasound-guided thoracentesis. Clinical correlation and follow-up  recommended.          MACRO:  None      Signed by: Dionne Garcia 2/3/2025 3:07 PM  Dictation workstation:   [id number]  ECG 12 lead  Normal sinus rhythm  Left axis deviation  Left ventricular hypertrophy with repolarization abnormality ( R in aVL )  Abnormal ECG  When compared with ECG of 15-SEP-2023 12:47,  Previous ECG has undetermined rhythm, needs review  Criteria for Septal infarct are no longer Present      Meds  Scheduled medications  acetaminophen, 975 mg, oral, q8h  [Held by provider] amLODIPine, 5 mg, oral, Daily  aspirin, 81 mg, oral, Daily  B complex-vitamin C, 1 tablet, oral, Daily  benzonatate, 200 mg, oral, TID  carvedilol, 12.5 mg, oral, BID  cefepime, 1 g, intravenous, q24h  cetirizine, 5 mg, oral, Daily  [Held by provider] ferrous sulfate (325 mg ferrous sulfate), 65 mg of iron, oral, Daily  gabapentin, 100 mg, oral, Nightly  heparin (porcine), 5,000 Units, subcutaneous, q8h LATA  polyethylene glycol, 17 g, oral, BID  pravastatin, 20 mg, oral, Daily  sennosides-docusate sodium, 2 tablet, oral, BID  sodium bicarbonate, 650 mg, oral, BID  [Held by provider] torsemide, 10 mg, oral, Daily      Continuous medications  dextrose 5%, 100 mL/hr, Last Rate: 100 mL/hr (02/04/25 1505)      PRN medications  PRN medications: dextromethorphan-guaifenesin,  ipratropium-albuteroL, ondansetron, prochlorperazine, vancomycin     Assessment and Plan    Camelia Jones is a 89 y.o. female from [facility] with PMH HTN, HLD, chronic anemia, CKD IV, chronic LE edema, pancreatitis, RLE DVT with post thrombotic syndrome, hx sacral ala / L5 fx, chronic hyponatremia, admitted with acute metabolic encephalopathy with azotemia with suspected uremic syndrome; WARREN/CKD stage IV; hypernatremia; bilateral pleural effusion; recent pneumonia.     Acute metabolic encephalopathy  -Likely secondary to dehydration with hypernatremia, acute kidney injury and pneumonia.  -Daughter, who was at the bedside today, 2/4, states patient still appears to be confused as she has no confusion at baseline.  -Will continue to treat underlying hyponatremia, WARREN and pneumonia and monitor.    WARREN with CKD stage IV  -Nephrology consulted and feels that WARREN likely secondary to volume depletion/dehydration.  Nephrology recommends to change IV fluids to D5W at 100 cc/h.  Monitor.    Hypernatremia  -Nephrology consulted hypernatremia likely secondary to volume depletion/dehydration.  Nephrology recommends to change IV fluids to D5W at 100 cc/h and recheck sodium every 8 hours.  -Monitor.    Bilateral pleural effusion with pneumonia  -Patient had been on Invanz prior to admission and during this admission and also started on vancomycin during this admission.  -ID consulted and recommends to change antibiotics to cefepime and continue IV vancomycin.  -Pressure precautions.  -Monitor.    Hypertension  -Holding home meds of amlodipine and torsemide.  -Continue Coreg and monitor.     Hyperlipidemia  -Continue pravastatin.    Chronic anemia  -Hemoglobin appears to be stable.  Monitor.    Right lower extremity DVT with post thrombotic syndrome  -Monitor.    DVT prophylaxis  -Heparin subcu.

## 2025-02-04 NOTE — DOCUMENTATION CLARIFICATION NOTE
PATIENT:               NIKI LUNA  ACCT #:                  [id number]  MRN:                       [id number]  :                       1934  ADMIT DATE:       2025 2:13 PM  DISCH DATE:  RESPONDING PROVIDER #:        [id number]          PROVIDER RESPONSE TEXT:    Hypernatremia    CDI QUERY TEXT:    Clarification        Instruction:    Based on your assessment of the patient and the clinical information, please provide the requested documentation by clicking on the appropriate radio button and enter any additional information if prompted.      Question: Based on your medical judgment, can you please clarify which of these conditions is the most clinically supported      When answering this query, please exercise your independent professional judgment. The fact that a question is being asked, does not imply that any particular answer is desired or expected.    The patient's clinical indicators include:  Clinical Information:  89 y/o female presents to [facility] from [place] c/o difficulty urinating. DX Pneumonia.    Documented Diagnoses: There is conflicting documentation in the medical record which requires clarification.  - 25 ED note per LEVY Xie MD: ?Hypernatremia?  - 25 H&P note per PABLO Silveira, DO: ?Labs were significant for hyponatremia 151? / ?Hyponatremia?    Clinical Indicators:  ED: T 36.8, HR 76, RR 17, SpO2 96, 152/92    Labs  - :  Sodium 151 - 149 - 150 - 149 - 149  Potassium 4.4 - 4.3 - 4.1 - 4.1 - 4.4  Glucose 97 - 109 - 105 - 124 - 103    Treatment: IVF Bolus NS , Continuous IVF D5 NS  -   Risk Factors: PMH: HTN, CKD, HLD, Anemia  Options provided:  -- Hypernatremia  -- Hyponatremia  -- Other - I will add my own diagnosis  -- Refer to Clinical Documentation Reviewer    Query created by: Melanie Bledsoe on 2025 2:40 PM      Electronically signed by:  JESU FANG DO 2025 2:54 PM

## 2025-02-04 NOTE — PROGRESS NOTES
Vancomycin Dosing by Pharmacy- FOLLOW UP    Camelia Jones is a 89 y.o. year old female who Pharmacy has been consulted for vancomycin dosing for pneumonia. Based on the patient's indication and renal status this patient is being dosed based on a goal trough/random level of 15-20.     Renal function is currently declining, but slight improvement, SCR 3.27, CRCL 9.8.  DOSE BY LEVELS.     Current vancomycin dose: 750 mg given once on 2/3 about 1200hrs.    Most recent random level: 7.7 mcg/mL on  at 0653hrs.    Visit Vitals  /79 (BP Location: Left leg, Patient Position: Lying)   Pulse 81   Temp 35.9 °C (96.7 °F) (Temporal)   Resp 16        Lab Results   Component Value Date    CREATININE 3.27 (H) 2025    CREATININE 3.35 (H) 2025    CREATININE 3.47 (H) 2025    CREATININE 3.63 (H) 2025        Patient weight is as follows:   Vitals:    25 0600   Weight: 60.5 kg (133 lb 6.1 oz)       Cultures:  No results found for the encounter in last 14 days.       I/O last 3 completed shifts:  In: 1628.8 (26.9 mL/kg) [P.O.:100; I.V.:1418.8 (23.5 mL/kg); IV Piggyback:110]  Out: 130 (2.1 mL/kg) [Urine:130 (0.1 mL/kg/hr)]  Weight: 60.5 kg   I/O during current shift:  No intake/output data recorded.    Temp (24hrs), Av.8 °C (98.2 °F), Min:35.9 °C (96.7 °F), Max:37.4 °C (99.3 °F)      Assessment/Plan    Below trough level goal.  Will give another Vanco 750mg once on .      This dosing regimen is predicted by [employer] to result in the following pharmacokinetic parameters:    DOSE BY LEVELS.     The next level will be obtained on  at 0500hrs.. May be obtained sooner if clinically indicated.   Will continue to monitor renal function daily while on vancomycin and order serum creatinine at least every 48 hours if not already ordered.  Follow for continued vancomycin needs, clinical response, and signs/symptoms of toxicity.       Jaime Conner, PharmD

## 2025-02-05 LAB
ALBUMIN SERPL BCP-MCNC: 2.9 G/DL (ref 3.4–5)
ANION GAP SERPL CALC-SCNC: 15 MMOL/L (ref 10–20)
BASOPHILS # BLD AUTO: 0.04 X10*3/UL (ref 0–0.1)
BASOPHILS NFR BLD AUTO: 0.5 %
BUN SERPL-MCNC: 59 MG/DL (ref 6–23)
CALCIUM SERPL-MCNC: 7.5 MG/DL (ref 8.6–10.3)
CHLORIDE SERPL-SCNC: 114 MMOL/L (ref 98–107)
CO2 SERPL-SCNC: 21 MMOL/L (ref 21–32)
CREAT SERPL-MCNC: 3.14 MG/DL (ref 0.5–1.05)
EGFRCR SERPLBLD CKD-EPI 2021: 14 ML/MIN/1.73M*2
EOSINOPHIL # BLD AUTO: 0.1 X10*3/UL (ref 0–0.4)
EOSINOPHIL NFR BLD AUTO: 1.1 %
ERYTHROCYTE [DISTWIDTH] IN BLOOD BY AUTOMATED COUNT: 15 % (ref 11.5–14.5)
GLUCOSE SERPL-MCNC: 92 MG/DL (ref 74–99)
HCT VFR BLD AUTO: 38.4 % (ref 36–46)
HGB BLD-MCNC: 12.1 G/DL (ref 12–16)
IMM GRANULOCYTES # BLD AUTO: 0.06 X10*3/UL (ref 0–0.5)
IMM GRANULOCYTES NFR BLD AUTO: 0.7 % (ref 0–0.9)
LYMPHOCYTES # BLD AUTO: 0.93 X10*3/UL (ref 0.8–3)
LYMPHOCYTES NFR BLD AUTO: 10.7 %
MAGNESIUM SERPL-MCNC: 2.06 MG/DL (ref 1.6–2.4)
MCH RBC QN AUTO: 29.4 PG (ref 26–34)
MCHC RBC AUTO-ENTMCNC: 31.5 G/DL (ref 32–36)
MCV RBC AUTO: 93 FL (ref 80–100)
MONOCYTES # BLD AUTO: 0.62 X10*3/UL (ref 0.05–0.8)
MONOCYTES NFR BLD AUTO: 7.1 %
NEUTROPHILS # BLD AUTO: 6.98 X10*3/UL (ref 1.6–5.5)
NEUTROPHILS NFR BLD AUTO: 79.9 %
NRBC BLD-RTO: 0 /100 WBCS (ref 0–0)
PHOSPHATE SERPL-MCNC: 4.7 MG/DL (ref 2.5–4.9)
PLATELET # BLD AUTO: 223 X10*3/UL (ref 150–450)
POTASSIUM SERPL-SCNC: 4.1 MMOL/L (ref 3.5–5.3)
PROCALCITONIN SERPL-MCNC: 0.46 NG/ML
RBC # BLD AUTO: 4.12 X10*6/UL (ref 4–5.2)
SODIUM SERPL-SCNC: 144 MMOL/L (ref 136–145)
SODIUM SERPL-SCNC: 145 MMOL/L (ref 136–145)
SODIUM SERPL-SCNC: 146 MMOL/L (ref 136–145)
VANCOMYCIN SERPL-MCNC: 15 UG/ML (ref 5–20)
WBC # BLD AUTO: 8.7 X10*3/UL (ref 4.4–11.3)

## 2025-02-05 PROCEDURE — 99232 SBSQ HOSP IP/OBS MODERATE 35: CPT | Performed by: INTERNAL MEDICINE

## 2025-02-05 PROCEDURE — 83735 ASSAY OF MAGNESIUM: CPT | Performed by: INTERNAL MEDICINE

## 2025-02-05 PROCEDURE — 2500000004 HC RX 250 GENERAL PHARMACY W/ HCPCS (ALT 636 FOR OP/ED): Performed by: INTERNAL MEDICINE

## 2025-02-05 PROCEDURE — 84145 PROCALCITONIN (PCT): CPT | Mod: GEALAB | Performed by: INTERNAL MEDICINE

## 2025-02-05 PROCEDURE — 84295 ASSAY OF SERUM SODIUM: CPT | Performed by: INTERNAL MEDICINE

## 2025-02-05 PROCEDURE — 2500000001 HC RX 250 WO HCPCS SELF ADMINISTERED DRUGS (ALT 637 FOR MEDICARE OP): Performed by: INTERNAL MEDICINE

## 2025-02-05 PROCEDURE — 36415 COLL VENOUS BLD VENIPUNCTURE: CPT | Performed by: INTERNAL MEDICINE

## 2025-02-05 PROCEDURE — 1200000002 HC GENERAL ROOM WITH TELEMETRY DAILY

## 2025-02-05 PROCEDURE — 97530 THERAPEUTIC ACTIVITIES: CPT | Mod: GP,CQ

## 2025-02-05 PROCEDURE — 80202 ASSAY OF VANCOMYCIN: CPT | Performed by: INTERNAL MEDICINE

## 2025-02-05 PROCEDURE — 85025 COMPLETE CBC W/AUTO DIFF WBC: CPT | Performed by: INTERNAL MEDICINE

## 2025-02-05 PROCEDURE — 97110 THERAPEUTIC EXERCISES: CPT | Mod: GP,CQ

## 2025-02-05 RX ORDER — LIDOCAINE 560 MG/1
1 PATCH PERCUTANEOUS; TOPICAL; TRANSDERMAL DAILY
Status: DISCONTINUED | OUTPATIENT
Start: 2025-02-05 | End: 2025-02-14 | Stop reason: HOSPADM

## 2025-02-05 RX ORDER — VANCOMYCIN HYDROCHLORIDE 500 MG/100ML
500 INJECTION, SOLUTION INTRAVENOUS ONCE
Status: COMPLETED | OUTPATIENT
Start: 2025-02-05 | End: 2025-02-05

## 2025-02-05 RX ORDER — DEXTROSE MONOHYDRATE 50 MG/ML
50 INJECTION, SOLUTION INTRAVENOUS CONTINUOUS
Status: ACTIVE | OUTPATIENT
Start: 2025-02-05 | End: 2025-02-06

## 2025-02-05 RX ADMIN — ACETAMINOPHEN 975 MG: 325 TABLET, FILM COATED ORAL at 13:31

## 2025-02-05 RX ADMIN — ASPIRIN 81 MG: 81 TABLET, COATED ORAL at 10:08

## 2025-02-05 RX ADMIN — SODIUM BICARBONATE 650 MG: 650 TABLET ORAL at 21:10

## 2025-02-05 RX ADMIN — Medication 1 TABLET: at 10:07

## 2025-02-05 RX ADMIN — CARVEDILOL 12.5 MG: 12.5 TABLET, FILM COATED ORAL at 10:08

## 2025-02-05 RX ADMIN — GABAPENTIN 100 MG: 100 CAPSULE ORAL at 21:10

## 2025-02-05 RX ADMIN — SODIUM BICARBONATE 650 MG: 650 TABLET ORAL at 10:08

## 2025-02-05 RX ADMIN — BENZONATATE 200 MG: 100 CAPSULE ORAL at 21:09

## 2025-02-05 RX ADMIN — HEPARIN SODIUM 5000 UNITS: 5000 INJECTION INTRAVENOUS; SUBCUTANEOUS at 00:06

## 2025-02-05 RX ADMIN — HEPARIN SODIUM 5000 UNITS: 5000 INJECTION INTRAVENOUS; SUBCUTANEOUS at 10:08

## 2025-02-05 RX ADMIN — DEXTROSE MONOHYDRATE 50 ML/HR: 5 INJECTION, SOLUTION INTRAVENOUS at 18:04

## 2025-02-05 RX ADMIN — CEFEPIME HYDROCHLORIDE 1 G: 1 INJECTION, SOLUTION INTRAVENOUS at 14:10

## 2025-02-05 RX ADMIN — CARVEDILOL 12.5 MG: 12.5 TABLET, FILM COATED ORAL at 21:10

## 2025-02-05 RX ADMIN — HEPARIN SODIUM 5000 UNITS: 5000 INJECTION INTRAVENOUS; SUBCUTANEOUS at 18:04

## 2025-02-05 RX ADMIN — BENZONATATE 200 MG: 100 CAPSULE ORAL at 15:21

## 2025-02-05 RX ADMIN — ACETAMINOPHEN 975 MG: 325 TABLET, FILM COATED ORAL at 04:55

## 2025-02-05 RX ADMIN — VANCOMYCIN HYDROCHLORIDE 500 MG: 500 INJECTION, SOLUTION INTRAVENOUS at 13:30

## 2025-02-05 RX ADMIN — ACETAMINOPHEN 975 MG: 325 TABLET, FILM COATED ORAL at 21:10

## 2025-02-05 RX ADMIN — PRAVASTATIN SODIUM 20 MG: 40 TABLET ORAL at 21:10

## 2025-02-05 RX ADMIN — CETIRIZINE HYDROCHLORIDE 5 MG: 10 TABLET, FILM COATED ORAL at 10:08

## 2025-02-05 RX ADMIN — BENZONATATE 200 MG: 100 CAPSULE ORAL at 10:08

## 2025-02-05 ASSESSMENT — COGNITIVE AND FUNCTIONAL STATUS - GENERAL
WALKING IN HOSPITAL ROOM: A LOT
TOILETING: TOTAL
MOBILITY SCORE: 13
STANDING UP FROM CHAIR USING ARMS: A LOT
TURNING FROM BACK TO SIDE WHILE IN FLAT BAD: A LOT
PERSONAL GROOMING: A LITTLE
HELP NEEDED FOR BATHING: A LOT
MOBILITY SCORE: 13
MOVING FROM LYING ON BACK TO SITTING ON SIDE OF FLAT BED WITH BEDRAILS: TOTAL
CLIMB 3 TO 5 STEPS WITH RAILING: A LOT
DAILY ACTIVITIY SCORE: 13
STANDING UP FROM CHAIR USING ARMS: A LOT
MOVING TO AND FROM BED TO CHAIR: TOTAL
CLIMB 3 TO 5 STEPS WITH RAILING: A LOT
EATING MEALS: A LITTLE
TURNING FROM BACK TO SIDE WHILE IN FLAT BAD: TOTAL
WALKING IN HOSPITAL ROOM: A LOT
HELP NEEDED FOR BATHING: A LOT
DRESSING REGULAR LOWER BODY CLOTHING: A LOT
DAILY ACTIVITIY SCORE: 15
TOILETING: TOTAL
MOVING FROM LYING ON BACK TO SITTING ON SIDE OF FLAT BED WITH BEDRAILS: A LITTLE
DRESSING REGULAR LOWER BODY CLOTHING: A LOT
MOVING FROM LYING ON BACK TO SITTING ON SIDE OF FLAT BED WITH BEDRAILS: A LITTLE
STANDING UP FROM CHAIR USING ARMS: TOTAL
DRESSING REGULAR UPPER BODY CLOTHING: A LITTLE
CLIMB 3 TO 5 STEPS WITH RAILING: TOTAL
DRESSING REGULAR UPPER BODY CLOTHING: A LOT
MOVING TO AND FROM BED TO CHAIR: A LOT
PERSONAL GROOMING: A LITTLE
MOVING TO AND FROM BED TO CHAIR: A LOT
WALKING IN HOSPITAL ROOM: TOTAL
MOBILITY SCORE: 6
TURNING FROM BACK TO SIDE WHILE IN FLAT BAD: A LOT

## 2025-02-05 ASSESSMENT — PAIN SCALES - WONG BAKER: WONGBAKER_NUMERICALRESPONSE: HURTS LITTLE MORE

## 2025-02-05 ASSESSMENT — PAIN SCALES - GENERAL
PAINLEVEL_OUTOF10: 0 - NO PAIN
PAINLEVEL_OUTOF10: 0 - NO PAIN

## 2025-02-05 ASSESSMENT — PAIN - FUNCTIONAL ASSESSMENT
PAIN_FUNCTIONAL_ASSESSMENT: WONG-BAKER FACES
PAIN_FUNCTIONAL_ASSESSMENT: 0-10

## 2025-02-05 NOTE — PROGRESS NOTES
Camelia Jones is a 89 y.o. female on day 3 of admission presenting with Hypernatremia.    Subjective   Interval History: no fever, no new complaints        Review of Systems    Objective   Range of Vitals (last 24 hours)  Heart Rate:  [67-81]   Temp:  [36.1 °C (97 °F)-36.7 °C (98.1 °F)]   Resp:  [16]   BP: (128-181)/(79-92)   Weight:  [59.9 kg (132 lb)]   SpO2:  [82 %-96 %]   Daily Weight  02/05/25 : 59.9 kg (132 lb)    Body mass index is 24.14 kg/m².    Physical Exam  Constitutional:       Appearance: Normal appearance.   HENT:      Head: Normocephalic and atraumatic.      Mouth/Throat:      Mouth: Mucous membranes are moist.      Pharynx: Oropharynx is clear.   Eyes:      Pupils: Pupils are equal, round, and reactive to light.   Cardiovascular:      Rate and Rhythm: Normal rate and regular rhythm.      Heart sounds: Normal heart sounds.   Pulmonary:      Effort: Pulmonary effort is normal.      Breath sounds: Normal breath sounds.   Abdominal:      General: Abdomen is flat. Bowel sounds are normal.      Palpations: Abdomen is soft.   Musculoskeletal:      Cervical back: Normal range of motion.   Neurological:      Mental Status: She is alert.         Antibiotics  cefepime - 1 gram/50 mL  ertapenem - 1 gram  vancomycin - 500 mg/100 mL    Relevant Results  Labs  Results from last 72 hours   Lab Units 02/05/25  0705 02/04/25  0653 02/03/25  0610   WBC AUTO x10*3/uL 8.7 9.4 8.1   HEMOGLOBIN g/dL 12.1 11.4* 11.9*   HEMATOCRIT % 38.4 39.7 38.1   PLATELETS AUTO x10*3/uL 223 227 271   NEUTROS PCT AUTO % 79.9 76.1 78.3   LYMPHS PCT AUTO % 10.7 10.7 10.0   MONOS PCT AUTO % 7.1 8.9 8.1   EOS PCT AUTO % 1.1 3.0 2.1     Results from last 72 hours   Lab Units 02/05/25  0705 02/05/25  0002 02/04/25  1613 02/04/25  0653 02/03/25  1419 02/03/25  0609   SODIUM mmol/L 146*  146* 146* 139 149* 149* 150*   POTASSIUM mmol/L 4.1  --   --  4.4 4.1 4.1   CHLORIDE mmol/L 114*  --   --  116* 114* 115*   CO2 mmol/L 21  --   --  25 24 24    BUN mg/dL 59*  --   --  62* 70* 72*   CREATININE mg/dL 3.14*  --   --  3.27* 3.35* 3.47*   GLUCOSE mg/dL 92  --   --  103* 124* 105*   CALCIUM mg/dL 7.5*  --   --  7.3* 7.7* 7.8*   ANION GAP mmol/L 15  --   --  12 15 15   EGFR mL/min/1.73m*2 14*  --   --  13* 13* 12*   PHOSPHORUS mg/dL 4.7  --   --  4.6  --  4.8     Results from last 72 hours   Lab Units 02/05/25  0705 02/04/25  0653 02/03/25  0609 02/02/25  1536   ALK PHOS U/L  --   --   --  74   BILIRUBIN TOTAL mg/dL  --   --   --  0.3   PROTEIN TOTAL g/dL  --   --  6.8 6.7   ALT U/L  --   --   --  11   AST U/L  --   --   --  19   ALBUMIN g/dL 2.9* 2.6* 3.1* 3.5     Estimated Creatinine Clearance: 9.4 mL/min (A) (by C-G formula based on SCr of 3.14 mg/dL (H)).  C-Reactive Protein   Date Value Ref Range Status   02/04/2025 5.67 (H) <1.00 mg/dL Final   02/03/2025 5.73 (H) <1.00 mg/dL Final   02/02/2025 4.75 (H) <1.00 mg/dL Final     Microbiology  Reviewed  Imaging  Reviewed        Assessment/Plan   Pneumonia / effusion / atelectasis  Pyuria, negative culture  Encephalopathy, likely metabolic, more alert     Recommendations :  Continue Cefepime and Vancomycin  Discussed with the medical team     I spent minutes in the professional and overall care of this patient.      Kostas Miller MD

## 2025-02-05 NOTE — CARE PLAN
The patient's goals for the shift include  to sleep.     The clinical goals for the shift include pt will sleep    Over the shift, the patient did not make progress toward the following goals. Barriers to progression include pt still confused. Recommendations to address these barriers include continue to treat patient.

## 2025-02-05 NOTE — PROGRESS NOTES
Vancomycin Dosing by Pharmacy- FOLLOW UP  Camelia Jones is a 89 y.o. year old female who Pharmacy has been consulted for vancomycin dosing for pneumonia. Based on the patient's indication and renal status this patient is being dosed based on a goal trough/random level of 15-20 - DOSE BY LEVEL.    Renal function is currently improving. Scr decreased 3.63 > 3.47 > 3.35 > 3.27 > 3.14.     Current vancomycin dose: 750 mg given once x2    Most recent random level: 15 mcg/mL    Visit Vitals  /80 (BP Location: Right arm, Patient Position: Lying)   Pulse 67   Temp 36.7 °C (98.1 °F) (Temporal)   Resp 16        Lab Results   Component Value Date    CREATININE 3.14 (H) 2025    CREATININE 3.27 (H) 2025    CREATININE 3.35 (H) 2025    CREATININE 3.47 (H) 2025        Patient weight is as follows:   Vitals:    25 0531   Weight: 59.9 kg (132 lb)       Cultures:  No results found for the encounter in last 14 days.       I/O last 3 completed shifts:  In: 3066.5 (51.2 mL/kg) [P.O.:274; I.V.:2482.5 (41.5 mL/kg); IV Piggyback:310]  Out: 510 (8.5 mL/kg) [Urine:510 (0.2 mL/kg/hr)]  Weight: 59.9 kg   I/O during current shift:  I/O this shift:  In: 120 [P.O.:120]  Out: -     Temp (24hrs), Av.7 °C (98 °F), Min:36.1 °C (97 °F), Max:37.3 °C (99.1 °F)      Assessment/Plan  Will give 500mg once on  at 11:00.  The next level will be obtained on  at 05:00. May be obtained sooner if clinically indicated.   Will continue to monitor renal function daily while on vancomycin and order serum creatinine at least every 48 hours if not already ordered.  Follow for continued vancomycin needs, clinical response, and signs/symptoms of toxicity.       Padma Grady, PharmD

## 2025-02-05 NOTE — PROGRESS NOTES
"Physical Therapy    Physical Therapy Treatment    Patient Name: Camelia Jones  MRN: [id number]  Department: [address]  Room: [address] [place], [place] [address]  Today's Date: 2/5/2025  Time Calculation  Start Time: 1250  Stop Time: 1313  Time Calculation (min): 23 min         Assessment/Plan   PT Assessment  PT Assessment Results: Decreased cognition, Decreased mobility, Decreased strength, Impaired balance, Pain  Rehab Prognosis: Fair  Barriers to Discharge Home: Cognition needs (LTC)  End of Session Communication: Bedside nurse, PCT/NA/CTA  Assessment Comment: continue to recommend Mod intensity skilled PT at DC for return to PLOF.  End of Session Patient Position: Alarm on, Bed, 4 rail up (dtr present and attentive; RN notified.  CNA alerted to pt. incontinence of bowel/bladder and needing assistance)     PT Plan  Treatment/Interventions: Bed mobility, Transfer training, Gait training, Therapeutic exercise, Therapeutic activity  PT Plan: Ongoing PT  PT Frequency: 3 times per week  PT Discharge Recommendations: Moderate intensity level of continued care  Equipment Recommended upon Discharge: Wheeled walker (owns)  PT Recommended Transfer Status: Assist x2  PT - OK to Discharge: Yes      General Visit Information:   PT  Visit  PT Received On: 02/05/25  General  Reason for Referral: 89 yo female admit with worsening mentation- UTI/PNA; referred to PT for impaired mobility/gait training  Referred By: Bear Silveira DO  Past Medical History Relevant to Rehab: PMH HTN, HLD, chronic anemia, CKD IV, chronic LE edema, pancreatitis, RLE DVT with post thrombotic syndrome, hx sacral ala / L5 fx, chronic hyponatremia.  Family/Caregiver Present: Yes  Caregiver Feedback: Dtr present, confirms PLOF  Prior to Session Communication: Bedside nurse  Patient Position Received: Alarm on, Bed, 3 rail up (Dtr present and very attentive)  Preferred Learning Style: verbal, visual, auditory  General Comment: AXOX3, mild Confusion, self limiting but agreeable, \"I cant " "do that \"  stated patient. Daughter at bedside stated peggy used Lidocaine patch and Tylenol for chronic back pain and LLE very sensitive to touch. (no Lidocaine patch on.unable to progress to EOB 2nd to fear & back pain. 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 quad sets, heel slides with minimal; flexion bilat knees, abd curls all x10 reps for strengthening                             Bed Mobility  Bed Mobility: Yes  Bed Mobility 1  Bed Mobility 1: Supine to sitting, Sitting to supine, Scooting  Level of Assistance 1: +2, Maximum assistance  Bed Mobility Comments 1: 3 attempts to get supine to sit, log roll, HOB elevated and pateint pulling as she wished. gavinoetn stopped c/o back pain, resisted movemnt and would lie back down. peggy did state \"yes I am afraid of falling\"  Bed Mobility 2  Bed Mobility  2: Rolling right, Rolling left  Level of Assistance 2: Maximum " assistance, Maximum verbal cues, Maximum tactile cues, +2                                  Outcome Measures:  [facility] Basic Mobility  Turning from your back to your side while in a flat bed without using bedrails: Total  Moving from lying on your back to sitting on the side of a flat bed without using bedrails: Total  Moving to and from bed to chair (including a wheelchair): Total  Standing up from a chair using your arms (e.g. wheelchair or bedside chair): Total  To walk in hospital room: Total  Climbing 3-5 steps with railing: Total  Basic Mobility - Total Score: 6    Education Documentation  Mobility Training, taught by Valentine Cisse PTA at 2/5/2025  3:43 PM.  Learner: Family  Readiness: Acceptance  Method: Explanation  Response: Needs Reinforcement    Education Comments  No comments found.        OP EDUCATION:       Encounter Problems       Encounter Problems (Active)       Balance       STG - Maintains static sitting balance at EOB with upper extremity support modified IND x 10 min with SUPERVISION to progress toward PLOF  (Not Progressing)       Start:  02/03/25    Expected End:  02/17/25       INTERVENTIONS:  1. Practice sitting on the edge of a bed/mat with minimal support.  2. Educate patient about maintining total hip precautions while maintaining balance.  3. Educate patient about pressure relief.  4. Educate patient about use of assistive device.            Mobility       STG - Patient will ambulate >100 ft with FWW SUPERVISION  (Not Progressing)       Start:  02/03/25    Expected End:  02/17/25               PT Transfers       STG - Transfer from bed to chair with SUPERVISION  (Not Progressing)       Start:  02/03/25    Expected End:  02/17/25            STG - Patient will perform bed mobility modified IND (Progressing)       Start:  02/03/25    Expected End:  02/17/25            STG - Patient will transfer sit to and from stand with FWW to/from various surfaces with SUPERVISION  (Not Progressing)        Start:  02/03/25    Expected End:  02/17/25               Pain - Adult

## 2025-02-05 NOTE — PROGRESS NOTES
"  Subjective    Patient denies chest pain, shortness of breath, nausea, vomiting or diarrhea.    Objective    Vitals  Visit Vitals  /80 (BP Location: Right arm, Patient Position: Lying)   Pulse 67   Temp 36.7 °C (98.1 °F) (Temporal)   Resp 16   Ht 1.575 m (5' 2.01\")   Wt 59.9 kg (132 lb)   SpO2 96%   BMI 24.14 kg/m²   OB Status Postmenopausal   Smoking Status Never   BSA 1.62 m²       Physical Exam   General: Alert and oriented to self, birthday, year, age, month but not to president. NAD.   HEENT: Sclera clear.  CVS: RRR.   Lungs: Mild diminished breath sounds.  Abdomen: Soft. NT. +BS.   Extremities: No significant pitting edema bilateral ankles.    Psychiatric: Cooperative.     IOs    Intake/Output Summary (Last 24 hours) at 2/5/2025 1617  Last data filed at 2/5/2025 1441  Gross per 24 hour   Intake 956.25 ml   Output 300 ml   Net 656.25 ml       Labs:   Results from last 72 hours   Lab Units 02/05/25  1522 02/05/25  0705 02/05/25  0002 02/04/25  1613 02/04/25  0653 02/03/25  1419 02/03/25  0609   SODIUM mmol/L 145 146*  146* 146*   < > 149* 149* 150*   POTASSIUM mmol/L  --  4.1  --   --  4.4 4.1 4.1   CHLORIDE mmol/L  --  114*  --   --  116* 114* 115*   CO2 mmol/L  --  21  --   --  25 24 24   BUN mg/dL  --  59*  --   --  62* 70* 72*   CREATININE mg/dL  --  3.14*  --   --  3.27* 3.35* 3.47*   GLUCOSE mg/dL  --  92  --   --  103* 124* 105*   CALCIUM mg/dL  --  7.5*  --   --  7.3* 7.7* 7.8*   ANION GAP mmol/L  --  15  --   --  12 15 15   EGFR mL/min/1.73m*2  --  14*  --   --  13* 13* 12*   PHOSPHORUS mg/dL  --  4.7  --   --  4.6  --  4.8    < > = values in this interval not displayed.      Results from last 72 hours   Lab Units 02/05/25  0705 02/04/25  0653 02/03/25  0610   WBC AUTO x10*3/uL 8.7 9.4 8.1   HEMOGLOBIN g/dL 12.1 11.4* 11.9*   HEMATOCRIT % 38.4 39.7 38.1   PLATELETS AUTO x10*3/uL 223 227 271   NEUTROS PCT AUTO % 79.9 76.1 78.3   LYMPHS PCT AUTO % 10.7 10.7 10.0   MONOS PCT AUTO % 7.1 8.9 8.1 "   EOS PCT AUTO % 1.1 3.0 2.1      Lab Results   Component Value Date    CALCIUM 7.5 (L) 02/05/2025    PHOS 4.7 02/05/2025      Lab Results   Component Value Date    CRP 5.67 (H) 02/04/2025      [unfilled]       Images  US chest  Narrative: Interpreted By:  Dionne Garcia,   STUDY:  US CHEST; ;  2/3/2025 10:23 am      INDICATION:  Signs/Symptoms:left pleural effusion.          COMPARISON:  None.      ACCESSION NUMBER(S):  [id number]      ORDERING CLINICIAN:  PRATIK ROWLEY      TECHNIQUE:  Limited ultrasound evaluation of the left chest cavity was obtained.      FINDINGS:  Limited ultrasound evaluation of the left chest cavity was obtained.  There is trace amount of pleural effusion identified.      Impression: Trace amount of left pleural effusion, insufficient for  ultrasound-guided thoracentesis. Clinical correlation and follow-up  recommended.          MACRO:  None      Signed by: Doinne Garcia 2/3/2025 3:07 PM  Dictation workstation:   [id number]  ECG 12 lead  Normal sinus rhythm  Left axis deviation  Left ventricular hypertrophy with repolarization abnormality ( R in aVL )  Abnormal ECG  When compared with ECG of 15-SEP-2023 12:47,  Previous ECG has undetermined rhythm, needs review  Criteria for Septal infarct are no longer Present      Meds  Scheduled medications  acetaminophen, 975 mg, oral, q8h  [Held by provider] amLODIPine, 5 mg, oral, Daily  aspirin, 81 mg, oral, Daily  B complex-vitamin C, 1 tablet, oral, Daily  benzonatate, 200 mg, oral, TID  carvedilol, 12.5 mg, oral, BID  cefepime, 1 g, intravenous, q24h  cetirizine, 5 mg, oral, Daily  [Held by provider] ferrous sulfate (325 mg ferrous sulfate), 65 mg of iron, oral, Daily  gabapentin, 100 mg, oral, Nightly  heparin (porcine), 5,000 Units, subcutaneous, q8h LATA  lidocaine, 1 patch, transdermal, Daily  [Held by provider] polyethylene glycol, 17 g, oral, BID  pravastatin, 20 mg, oral, Daily  [Held by provider] sennosides-docusate sodium, 2 tablet,  oral, BID  sodium bicarbonate, 650 mg, oral, BID  [Held by provider] torsemide, 10 mg, oral, Daily      Continuous medications     PRN medications  PRN medications: dextromethorphan-guaifenesin, ipratropium-albuteroL, ondansetron, prochlorperazine, vancomycin     Assessment and Plan    Camelia Jones is a 89 y.o. female from ECF with PMH HTN, HLD, chronic anemia, CKD IV, chronic LE edema, pancreatitis, RLE DVT with post thrombotic syndrome, hx sacral ala / L5 fx, chronic hyponatremia, admitted with acute metabolic encephalopathy with azotemia with suspected uremic syndrome; WARREN/CKD stage IV; hypernatremia; bilateral pleural effusion; recent pneumonia.      Acute metabolic encephalopathy  -Likely secondary to dehydration with hypernatremia, acute kidney injury and pneumonia.  -Continue to treat underlying hyponatremia, WARREN and pneumonia.  -Monitor.     WARREN with CKD stage IV  -Nephrology consulted and feels that WARREN likely secondary to volume depletion/dehydration.    -We change IV fluids to D5W on 2/4, but due to drop in sodium, we discontinued IV fluids on 2/4.    -Creatinine down to 3.14 today; 3.272/4.  -We will place on D5W at 50 cc/h and monitor.     Hypernatremia  -Nephrology consulted hypernatremia likely secondary to volume depletion/dehydration.  Nephrology recommended to change IV fluids to D5W at 100 cc/h and recheck sodium every 8 hours.  -As sodium had dropped to 139 from 149 in the same day, we stopped D5W.  Sodium trended up to 146 just after midnight on 2/5.  So, not sure if lab drawn close to where IV fluids were being administered, but we did stop IV fluids on 2/4.    -Will restart IV fluids with D5W at 50 cc/h and continue to monitor sodium every 8 hours.  -Monitor.     Bilateral pleural effusion with pneumonia  -Patient had been on Invanz prior to admission and during this admission and also started on vancomycin during this admission.  -ID consulted and recommends to changed antibiotics to  cefepime on 2/4 and continue IV vancomycin.  -Place on aspiration precautions.  -Monitor.     Hypertension  -Holding home meds of amlodipine and torsemide.  -Continue Coreg and monitor.      Hyperlipidemia  -Continue pravastatin.     Chronic anemia  -Hemoglobin appears to be stable.    -Monitor.     Right lower extremity DVT with post thrombotic syndrome  -Monitor.     DVT prophylaxis  -Heparin subcu.

## 2025-02-05 NOTE — NURSING NOTE
1930: assumed pt care    2-5-25/0041: pt's repeat sodium level was 146, let hospitalist Dr. Garcia know

## 2025-02-05 NOTE — PROGRESS NOTES
02/05/25 0850   Discharge Planning   Living Arrangements Other (Comment)  (from [facility])   Support Systems Children   Assistance Needed baseline A&Ox3-4;assist for ADLs and self propels in wheelchair; room air baseline and currently room air   Type of Residence Nursing home/residential care   Do you have animals or pets at home? No   Home or Post Acute Services Post acute facilities (Rehab/SNF/etc)   Type of Post Acute Facility Services Long term care   Expected Discharge Disposition Inter  (Spoke with pt's dtr at the bedside, preference for pt to return to [facility] when medically ready. No precert)   Does the patient need discharge transport arranged? Yes   RoundTrip coordination needed? Yes   Has discharge transport been arranged? No

## 2025-02-05 NOTE — CARE PLAN
The patient's goals for the shift include  tolerate every 2 hour turns    The clinical goals for the shift include pt will sleep

## 2025-02-06 LAB
ALBUMIN SERPL BCP-MCNC: 2.7 G/DL (ref 3.4–5)
ANION GAP SERPL CALC-SCNC: 15 MMOL/L (ref 10–20)
BASOPHILS # BLD AUTO: 0.05 X10*3/UL (ref 0–0.1)
BASOPHILS NFR BLD AUTO: 0.5 %
BUN SERPL-MCNC: 56 MG/DL (ref 6–23)
CALCIUM SERPL-MCNC: 7.5 MG/DL (ref 8.6–10.3)
CHLORIDE SERPL-SCNC: 111 MMOL/L (ref 98–107)
CO2 SERPL-SCNC: 21 MMOL/L (ref 21–32)
CREAT SERPL-MCNC: 3.13 MG/DL (ref 0.5–1.05)
EGFRCR SERPLBLD CKD-EPI 2021: 14 ML/MIN/1.73M*2
EOSINOPHIL # BLD AUTO: 0.28 X10*3/UL (ref 0–0.4)
EOSINOPHIL NFR BLD AUTO: 2.8 %
ERYTHROCYTE [DISTWIDTH] IN BLOOD BY AUTOMATED COUNT: 15.1 % (ref 11.5–14.5)
GLUCOSE SERPL-MCNC: 99 MG/DL (ref 74–99)
HCT VFR BLD AUTO: 35.2 % (ref 36–46)
HGB BLD-MCNC: 10.8 G/DL (ref 12–16)
IMM GRANULOCYTES # BLD AUTO: 0.06 X10*3/UL (ref 0–0.5)
IMM GRANULOCYTES NFR BLD AUTO: 0.6 % (ref 0–0.9)
LYMPHOCYTES # BLD AUTO: 0.75 X10*3/UL (ref 0.8–3)
LYMPHOCYTES NFR BLD AUTO: 7.6 %
MAGNESIUM SERPL-MCNC: 1.99 MG/DL (ref 1.6–2.4)
MCH RBC QN AUTO: 29.7 PG (ref 26–34)
MCHC RBC AUTO-ENTMCNC: 30.7 G/DL (ref 32–36)
MCV RBC AUTO: 97 FL (ref 80–100)
MONOCYTES # BLD AUTO: 0.79 X10*3/UL (ref 0.05–0.8)
MONOCYTES NFR BLD AUTO: 8 %
NEUTROPHILS # BLD AUTO: 7.99 X10*3/UL (ref 1.6–5.5)
NEUTROPHILS NFR BLD AUTO: 80.5 %
NRBC BLD-RTO: 0 /100 WBCS (ref 0–0)
PHOSPHATE SERPL-MCNC: 4.4 MG/DL (ref 2.5–4.9)
PLATELET # BLD AUTO: 241 X10*3/UL (ref 150–450)
POTASSIUM SERPL-SCNC: 4 MMOL/L (ref 3.5–5.3)
RBC # BLD AUTO: 3.64 X10*6/UL (ref 4–5.2)
SODIUM SERPL-SCNC: 143 MMOL/L (ref 136–145)
VANCOMYCIN SERPL-MCNC: 18.7 UG/ML (ref 5–20)
WBC # BLD AUTO: 9.9 X10*3/UL (ref 4.4–11.3)

## 2025-02-06 PROCEDURE — 2500000004 HC RX 250 GENERAL PHARMACY W/ HCPCS (ALT 636 FOR OP/ED): Performed by: INTERNAL MEDICINE

## 2025-02-06 PROCEDURE — 2500000004 HC RX 250 GENERAL PHARMACY W/ HCPCS (ALT 636 FOR OP/ED): Performed by: NURSE PRACTITIONER

## 2025-02-06 PROCEDURE — 1100000001 HC PRIVATE ROOM DAILY

## 2025-02-06 PROCEDURE — 80069 RENAL FUNCTION PANEL: CPT | Performed by: INTERNAL MEDICINE

## 2025-02-06 PROCEDURE — 36415 COLL VENOUS BLD VENIPUNCTURE: CPT | Performed by: INTERNAL MEDICINE

## 2025-02-06 PROCEDURE — 2500000005 HC RX 250 GENERAL PHARMACY W/O HCPCS: Performed by: INTERNAL MEDICINE

## 2025-02-06 PROCEDURE — 99233 SBSQ HOSP IP/OBS HIGH 50: CPT | Performed by: INTERNAL MEDICINE

## 2025-02-06 PROCEDURE — 99232 SBSQ HOSP IP/OBS MODERATE 35: CPT | Performed by: INTERNAL MEDICINE

## 2025-02-06 PROCEDURE — 85025 COMPLETE CBC W/AUTO DIFF WBC: CPT | Performed by: INTERNAL MEDICINE

## 2025-02-06 PROCEDURE — 80202 ASSAY OF VANCOMYCIN: CPT | Performed by: INTERNAL MEDICINE

## 2025-02-06 PROCEDURE — 83735 ASSAY OF MAGNESIUM: CPT | Performed by: INTERNAL MEDICINE

## 2025-02-06 PROCEDURE — 2500000001 HC RX 250 WO HCPCS SELF ADMINISTERED DRUGS (ALT 637 FOR MEDICARE OP): Performed by: INTERNAL MEDICINE

## 2025-02-06 RX ORDER — LORAZEPAM 2 MG/ML
1 INJECTION INTRAMUSCULAR ONCE
Status: COMPLETED | OUTPATIENT
Start: 2025-02-06 | End: 2025-02-06

## 2025-02-06 RX ADMIN — DEXTROSE MONOHYDRATE 50 ML/HR: 5 INJECTION, SOLUTION INTRAVENOUS at 11:32

## 2025-02-06 RX ADMIN — LORAZEPAM 1 MG: 2 INJECTION INTRAMUSCULAR; INTRAVENOUS at 19:54

## 2025-02-06 RX ADMIN — BENZONATATE 200 MG: 100 CAPSULE ORAL at 10:05

## 2025-02-06 RX ADMIN — BENZONATATE 200 MG: 100 CAPSULE ORAL at 20:00

## 2025-02-06 RX ADMIN — ACETAMINOPHEN 975 MG: 325 TABLET, FILM COATED ORAL at 13:20

## 2025-02-06 RX ADMIN — GABAPENTIN 100 MG: 100 CAPSULE ORAL at 20:00

## 2025-02-06 RX ADMIN — ASPIRIN 81 MG: 81 TABLET, COATED ORAL at 10:04

## 2025-02-06 RX ADMIN — CETIRIZINE HYDROCHLORIDE 5 MG: 10 TABLET, FILM COATED ORAL at 10:04

## 2025-02-06 RX ADMIN — HEPARIN SODIUM 5000 UNITS: 5000 INJECTION INTRAVENOUS; SUBCUTANEOUS at 23:38

## 2025-02-06 RX ADMIN — CARVEDILOL 12.5 MG: 12.5 TABLET, FILM COATED ORAL at 10:04

## 2025-02-06 RX ADMIN — ACETAMINOPHEN 975 MG: 325 TABLET, FILM COATED ORAL at 20:42

## 2025-02-06 RX ADMIN — BENZONATATE 200 MG: 100 CAPSULE ORAL at 15:22

## 2025-02-06 RX ADMIN — PRAVASTATIN SODIUM 20 MG: 40 TABLET ORAL at 20:00

## 2025-02-06 RX ADMIN — CARVEDILOL 12.5 MG: 12.5 TABLET, FILM COATED ORAL at 20:00

## 2025-02-06 RX ADMIN — LIDOCAINE 4% 1 PATCH: 40 PATCH TOPICAL at 10:06

## 2025-02-06 RX ADMIN — HEPARIN SODIUM 5000 UNITS: 5000 INJECTION INTRAVENOUS; SUBCUTANEOUS at 10:03

## 2025-02-06 RX ADMIN — Medication 1 TABLET: at 10:04

## 2025-02-06 RX ADMIN — HEPARIN SODIUM 5000 UNITS: 5000 INJECTION INTRAVENOUS; SUBCUTANEOUS at 00:19

## 2025-02-06 RX ADMIN — CEFEPIME HYDROCHLORIDE 1 G: 1 INJECTION, SOLUTION INTRAVENOUS at 10:35

## 2025-02-06 RX ADMIN — SODIUM BICARBONATE 650 MG: 650 TABLET ORAL at 10:04

## 2025-02-06 RX ADMIN — HEPARIN SODIUM 5000 UNITS: 5000 INJECTION INTRAVENOUS; SUBCUTANEOUS at 15:22

## 2025-02-06 RX ADMIN — SODIUM BICARBONATE 650 MG: 650 TABLET ORAL at 20:00

## 2025-02-06 ASSESSMENT — COGNITIVE AND FUNCTIONAL STATUS - GENERAL
DAILY ACTIVITIY SCORE: 15
DRESSING REGULAR UPPER BODY CLOTHING: A LITTLE
STANDING UP FROM CHAIR USING ARMS: A LOT
TURNING FROM BACK TO SIDE WHILE IN FLAT BAD: A LOT
CLIMB 3 TO 5 STEPS WITH RAILING: A LOT
TOILETING: TOTAL
STANDING UP FROM CHAIR USING ARMS: A LOT
DRESSING REGULAR LOWER BODY CLOTHING: A LOT
MOBILITY SCORE: 13
TOILETING: TOTAL
MOVING TO AND FROM BED TO CHAIR: A LOT
MOVING FROM LYING ON BACK TO SITTING ON SIDE OF FLAT BED WITH BEDRAILS: A LITTLE
MOBILITY SCORE: 13
DRESSING REGULAR LOWER BODY CLOTHING: A LOT
MOVING TO AND FROM BED TO CHAIR: A LOT
TURNING FROM BACK TO SIDE WHILE IN FLAT BAD: A LOT
CLIMB 3 TO 5 STEPS WITH RAILING: A LOT
WALKING IN HOSPITAL ROOM: A LOT
DRESSING REGULAR UPPER BODY CLOTHING: A LITTLE
MOVING FROM LYING ON BACK TO SITTING ON SIDE OF FLAT BED WITH BEDRAILS: A LITTLE
HELP NEEDED FOR BATHING: A LOT
PERSONAL GROOMING: A LITTLE
DAILY ACTIVITIY SCORE: 15
PERSONAL GROOMING: A LITTLE
HELP NEEDED FOR BATHING: A LOT
WALKING IN HOSPITAL ROOM: A LOT

## 2025-02-06 ASSESSMENT — PAIN SCALES - GENERAL
PAINLEVEL_OUTOF10: 0 - NO PAIN
PAINLEVEL_OUTOF10: 0 - NO PAIN

## 2025-02-06 ASSESSMENT — PAIN - FUNCTIONAL ASSESSMENT: PAIN_FUNCTIONAL_ASSESSMENT: 0-10

## 2025-02-06 NOTE — CARE PLAN
The patient's goals for the shift include      The clinical goals for the shift include pt will remain free from SOB throughout shift    1908 bedside report done- pt awake and relaxing in bed. Bed alarm on, call light within reach and no needs at this time     [id number] Dr Garcia messaged regarding NA lab result as ordered

## 2025-02-06 NOTE — PROGRESS NOTES
Vancomycin Dosing by Pharmacy- FOLLOW UP  Camelia Jones is a 89 y.o. year old female who Pharmacy has been consulted for vancomycin dosing for pneumonia. Based on the patient's indication and renal status this patient is being dosed based on a goal trough/random level of 15-20 - DOSE BY LEVEL    Renal function is currently improving. Scr decreased 3.63 > 3.47 > 3.35 > 3.27 > 3.14 > 3.13.    Current vancomycin dose: 750 mg given once x2, 500 mg once x1    Most recent random level: 18.7 mcg/mL    Visit Vitals  /86   Pulse 83   Temp 37.1 °C (98.8 °F) (Temporal)   Resp 18        Lab Results   Component Value Date    CREATININE 3.13 (H) 2025    CREATININE 3.14 (H) 2025    CREATININE 3.27 (H) 2025    CREATININE 3.35 (H) 2025        Patient weight is as follows:   Vitals:    25 0617   Weight: 60.2 kg (132 lb 12.8 oz)       Cultures:  No results found for the encounter in last 14 days.       I/O last 3 completed shifts:  In: 961.7 (16 mL/kg) [P.O.:340; I.V.:471.7 (7.8 mL/kg); IV Piggyback:150]  Out: 475 (7.9 mL/kg) [Urine:475 (0.2 mL/kg/hr)]  Weight: 60.2 kg   I/O during current shift:  No intake/output data recorded.    Temp (24hrs), Av °C (98.6 °F), Min:36.9 °C (98.4 °F), Max:37.1 °C (98.8 °F)      Assessment/Plan  Within goal random/trough level. No dose today, .  The next level will be obtained on  at 05:00. May be obtained sooner if clinically indicated.   Will continue to monitor renal function daily while on vancomycin and order serum creatinine at least every 48 hours if not already ordered.  Follow for continued vancomycin needs, clinical response, and signs/symptoms of toxicity.       Padma Grady, PharmD

## 2025-02-06 NOTE — PROGRESS NOTES
"  Subjective    Patient denies chest pain, shortness breath, nausea, vomiting or diarrhea.  Nurse reports patient had some confusion last night.    Objective    Vitals  Visit Vitals  /86   Pulse 83   Temp 37.1 °C (98.8 °F) (Temporal)   Resp 18   Ht 1.575 m (5' 2.01\")   Wt 60.2 kg (132 lb 12.8 oz)   SpO2 95%   BMI 24.28 kg/m²   OB Status Postmenopausal   Smoking Status Never   BSA 1.62 m²       Physical Exam   General: Alert and oriented to self, president, part of birthday but not to the year.  No acute distress.  HEENT: Sclera clear.  CVS: RRR.   Lungs: CTAB.  Abdomen: Soft.  Nontender.  Bowel sounds present.     Extremities: No significant pitting edema bilat ankles.    Psychiatric: Cooperative.  Neurologic: Strength is 5/5 in all extremities     IOs    Intake/Output Summary (Last 24 hours) at 2/6/2025 1328  Last data filed at 2/6/2025 1122  Gross per 24 hour   Intake 791.67 ml   Output 175 ml   Net 616.67 ml       Labs:   Results from last 72 hours   Lab Units 02/06/25  0634 02/05/25  2120 02/05/25  1522 02/05/25  0705 02/04/25  1613 02/04/25  0653   SODIUM mmol/L 143 144 145 146*  146*   < > 149*   POTASSIUM mmol/L 4.0  --   --  4.1  --  4.4   CHLORIDE mmol/L 111*  --   --  114*  --  116*   CO2 mmol/L 21  --   --  21  --  25   BUN mg/dL 56*  --   --  59*  --  62*   CREATININE mg/dL 3.13*  --   --  3.14*  --  3.27*   GLUCOSE mg/dL 99  --   --  92  --  103*   CALCIUM mg/dL 7.5*  --   --  7.5*  --  7.3*   ANION GAP mmol/L 15  --   --  15  --  12   EGFR mL/min/1.73m*2 14*  --   --  14*  --  13*   PHOSPHORUS mg/dL 4.4  --   --  4.7  --  4.6    < > = values in this interval not displayed.      Results from last 72 hours   Lab Units 02/06/25  0634 02/05/25  0705 02/04/25  0653   WBC AUTO x10*3/uL 9.9 8.7 9.4   HEMOGLOBIN g/dL 10.8* 12.1 11.4*   HEMATOCRIT % 35.2* 38.4 39.7   PLATELETS AUTO x10*3/uL 241 223 227   NEUTROS PCT AUTO % 80.5 79.9 76.1   LYMPHS PCT AUTO % 7.6 10.7 10.7   MONOS PCT AUTO % 8.0 7.1 8.9 "   EOS PCT AUTO % 2.8 1.1 3.0      Lab Results   Component Value Date    CALCIUM 7.5 (L) 02/06/2025    PHOS 4.4 02/06/2025      Lab Results   Component Value Date    CRP 5.67 (H) 02/04/2025      [unfilled]       Images  US chest  Narrative: Interpreted By:  Dionne Garcia,   STUDY:  US CHEST; ;  2/3/2025 10:23 am      INDICATION:  Signs/Symptoms:left pleural effusion.          COMPARISON:  None.      ACCESSION NUMBER(S):  [id number]      ORDERING CLINICIAN:  PRATIK ROWLEY      TECHNIQUE:  Limited ultrasound evaluation of the left chest cavity was obtained.      FINDINGS:  Limited ultrasound evaluation of the left chest cavity was obtained.  There is trace amount of pleural effusion identified.      Impression: Trace amount of left pleural effusion, insufficient for  ultrasound-guided thoracentesis. Clinical correlation and follow-up  recommended.          MACRO:  None      Signed by: Dionne Garcia 2/3/2025 3:07 PM  Dictation workstation:   [id number]  ECG 12 lead  Normal sinus rhythm  Left axis deviation  Left ventricular hypertrophy with repolarization abnormality ( R in aVL )  Abnormal ECG  When compared with ECG of 15-SEP-2023 12:47,  Previous ECG has undetermined rhythm, needs review  Criteria for Septal infarct are no longer Present      Meds  Scheduled medications  acetaminophen, 975 mg, oral, q8h  [Held by provider] amLODIPine, 5 mg, oral, Daily  aspirin, 81 mg, oral, Daily  B complex-vitamin C, 1 tablet, oral, Daily  benzonatate, 200 mg, oral, TID  carvedilol, 12.5 mg, oral, BID  cefepime, 1 g, intravenous, q24h  cetirizine, 5 mg, oral, Daily  [Held by provider] ferrous sulfate (325 mg ferrous sulfate), 65 mg of iron, oral, Daily  gabapentin, 100 mg, oral, Nightly  heparin (porcine), 5,000 Units, subcutaneous, q8h LATA  lidocaine, 1 patch, transdermal, Daily  [Held by provider] polyethylene glycol, 17 g, oral, BID  pravastatin, 20 mg, oral, Daily  [Held by provider] sennosides-docusate sodium, 2 tablet,  oral, BID  sodium bicarbonate, 650 mg, oral, BID  [Held by provider] torsemide, 10 mg, oral, Daily      Continuous medications  dextrose 5%, 50 mL/hr, Last Rate: 50 mL/hr (02/06/25 1132)      PRN medications  PRN medications: dextromethorphan-guaifenesin, ipratropium-albuteroL, ondansetron, prochlorperazine, vancomycin     Assessment and Plan    Camelia Jones is a 89 y.o. female from ECF with PMH HTN, HLD, chronic anemia, CKD IV, chronic LE edema, pancreatitis, RLE DVT with post thrombotic syndrome, hx sacral ala / L5 fx, chronic hyponatremia, admitted with acute metabolic encephalopathy with azotemia with suspected uremic syndrome; WARREN/CKD stage IV; hypernatremia; bilateral pleural effusion; recent pneumonia.      Acute metabolic encephalopathy  -Likely secondary to dehydration with hypernatremia, acute kidney injury and pneumonia.  -Continue to treat underlying hyponatremia, WARREN and pneumonia.  -Patient's daughter is at the bedside and states her mental status was better yesterday and has some increased confusion today, which also goes along with my assessment today as well.  No obvious signs of stroke on physical exam with no focal deficits.    -Monitor.     WARREN with CKD stage IV  -Nephrology consulted and feels that WARREN likely secondary to volume depletion/dehydration.    -We changed IV fluids to D5W on 2/4, but due to drop in sodium, we discontinued IV fluids on 2/4.    -Restart IV fluids with D5W at 50 cc/h on 2/5.  -Creatinine down to 3.13 today; 3.14 on 2/5; 3.27 on 2/4.  -Renal function is around recent baseline.  -I discussed case with nephrology today, 2/6, as well.     Hypernatremia  -Nephrology consulted and feels that hypernatremia likely secondary to volume depletion/dehydration.  Nephrology recommended to change IV fluids to D5W at 100 cc/h and recheck sodium every 8 hours.  -As sodium had dropped to 139 from 149 in the same day, we stopped D5W.  Sodium trended up to 146 just after midnight on 2/5.   So, not sure if lab drawn close to where IV fluids were being administered, but we did stop IV fluids on 2/4.    -Restarted IV fluids with D5W at 50 cc/h on 2/6.   -Monitor.     Bilateral pleural effusion with pneumonia  -Patient had been on Invanz prior to admission and during this admission and also started on vancomycin during this admission.  -ID consulted and recommends to continue cefepime and continue IV vancomycin.  -Continue aspiration precautions.  -Monitor.     Hypertension  -Holding home meds of amlodipine and torsemide.  -Continue Coreg and monitor.      Hyperlipidemia  -Continue pravastatin.     Chronic anemia  -Hemoglobin appears to be stable.    -Monitor.     Right lower extremity DVT with post thrombotic syndrome  -Monitor.     DVT prophylaxis  -Heparin subcu.

## 2025-02-06 NOTE — PROGRESS NOTES
Camelia Jones is a 89 y.o. female on day 4 of admission presenting with Hypernatremia.    Subjective   Interval History: no fever, no new complaints        Review of Systems    Objective   Range of Vitals (last 24 hours)  Heart Rate:  [69-84]   Temp:  [36.9 °C (98.4 °F)-37.1 °C (98.8 °F)]   Resp:  [14-20]   BP: (126-162)/(65-86)   Weight:  [60.2 kg (132 lb 12.8 oz)]   SpO2:  [95 %-98 %]   Daily Weight  02/06/25 : 60.2 kg (132 lb 12.8 oz)    Body mass index is 24.28 kg/m².    Physical Exam  Constitutional:       Appearance: Normal appearance.   HENT:      Head: Normocephalic and atraumatic.      Mouth/Throat:      Mouth: Mucous membranes are moist.      Pharynx: Oropharynx is clear.   Eyes:      Pupils: Pupils are equal, round, and reactive to light.   Cardiovascular:      Rate and Rhythm: Normal rate and regular rhythm.      Heart sounds: Normal heart sounds.   Pulmonary:      Effort: Pulmonary effort is normal.      Breath sounds: Normal breath sounds.   Abdominal:      General: Abdomen is flat. Bowel sounds are normal.      Palpations: Abdomen is soft.   Musculoskeletal:      Cervical back: Normal range of motion.   Neurological:      Mental Status: She is alert.         Antibiotics  cefepime - 1 gram/50 mL  ertapenem - 1 gram  vancomycin    Relevant Results  Labs  Results from last 72 hours   Lab Units 02/06/25  0634 02/05/25  0705 02/04/25  0653   WBC AUTO x10*3/uL 9.9 8.7 9.4   HEMOGLOBIN g/dL 10.8* 12.1 11.4*   HEMATOCRIT % 35.2* 38.4 39.7   PLATELETS AUTO x10*3/uL 241 223 227   NEUTROS PCT AUTO % 80.5 79.9 76.1   LYMPHS PCT AUTO % 7.6 10.7 10.7   MONOS PCT AUTO % 8.0 7.1 8.9   EOS PCT AUTO % 2.8 1.1 3.0     Results from last 72 hours   Lab Units 02/06/25  0634 02/05/25  2120 02/05/25  1522 02/05/25  0705 02/04/25  1613 02/04/25  0653   SODIUM mmol/L 143 144 145 146*  146*   < > 149*   POTASSIUM mmol/L 4.0  --   --  4.1  --  4.4   CHLORIDE mmol/L 111*  --   --  114*  --  116*   CO2 mmol/L 21  --   --  21   --  25   BUN mg/dL 56*  --   --  59*  --  62*   CREATININE mg/dL 3.13*  --   --  3.14*  --  3.27*   GLUCOSE mg/dL 99  --   --  92  --  103*   CALCIUM mg/dL 7.5*  --   --  7.5*  --  7.3*   ANION GAP mmol/L 15  --   --  15  --  12   EGFR mL/min/1.73m*2 14*  --   --  14*  --  13*   PHOSPHORUS mg/dL 4.4  --   --  4.7  --  4.6    < > = values in this interval not displayed.     Results from last 72 hours   Lab Units 02/06/25  0634 02/05/25  0705 02/04/25  0653   ALBUMIN g/dL 2.7* 2.9* 2.6*     Estimated Creatinine Clearance: 10.2 mL/min (A) (by C-G formula based on SCr of 3.13 mg/dL (H)).  C-Reactive Protein   Date Value Ref Range Status   02/04/2025 5.67 (H) <1.00 mg/dL Final   02/03/2025 5.73 (H) <1.00 mg/dL Final   02/02/2025 4.75 (H) <1.00 mg/dL Final     Microbiology  Reviewed  Imaging  Reviewed        Assessment/Plan   Pneumonia / effusion / atelectasis, procalcitonin 0.46  Pyuria, negative culture  Encephalopathy, likely metabolic, more alert     Recommendations :  Continue Cefepime and Vancomycin  Up in the chair   Discussed with the medical team     I spent minutes in the professional and overall care of this patient.      Kostas Miller MD

## 2025-02-06 NOTE — PROGRESS NOTES
"Camelia Jones is a 89 y.o. female on day 4 of admission presenting with Hypernatremia.      Subjective   Patient was seen and examined today.  Sodium is normal with D5W.  Discussed appropriate hydration with her-she adamantly refusing drinking water? seems like she does not like the taste of water.  I recommended 45 ounces minimal fluid intake-she replied \"this is not happening \"       Objective          Vitals 24HR  Heart Rate:  [69-84]   Temp:  [36.9 °C (98.4 °F)-37.1 °C (98.8 °F)]   Resp:  [14-20]   BP: (126-162)/(65-86)   Weight:  [60.2 kg (132 lb 12.8 oz)]   SpO2:  [95 %-98 %]         Intake/Output last 3 Shifts:    Intake/Output Summary (Last 24 hours) at 2/6/2025 1014  Last data filed at 2/6/2025 0926  Gross per 24 hour   Intake 741.67 ml   Output 175 ml   Net 566.67 ml       Physical Exam      General appearance: Elderly, confused, euvolemic on exam  Eyes: non-icteric  HEENT: atrumatic head, PEERLA, dry mucosa  Skin: no apparent rash  Heart: NSR, S1, S2 normal, no murmur or gallop  Lungs: Symmetrical expansion,CTA bilat no wheezing/crackles  Abdomen: soft, nt/nd, obese  Extremities: no edema bilat  Neuro: No FND,asterixis, no focal deficits noticed     RFP  Recent Labs     02/06/25  0634 02/05/25  2120 02/05/25  1522 02/05/25  0705 02/05/25  0002 02/04/25  1613 02/04/25  0653 02/03/25  1419 02/03/25  0609 02/02/25  1947 02/02/25  1536 02/02/25  1536 09/21/23  0536 09/20/23  0524 09/19/23  0958 09/19/23  0513    144 145 146*  146* 146* 139 149* 149* 150* 149*   < > 151* 128* 129*   < > 126*   K 4.0  --   --  4.1  --   --  4.4 4.1 4.1 4.3  --  4.4 4.3 4.2   < > 4.4   *  --   --  114*  --   --  116* 114* 115* 116*  --  114* 99 100   < > 97*   CO2 21  --   --  21  --   --  25 24 24 20*  --  25 18* 20*   < > 20*   BUN 56*  --   --  59*  --   --  62* 70* 72* 76*  --  78* 32* 34*   < > 32*   CREATININE 3.13*  --   --  3.14*  --   --  3.27* 3.35* 3.47* 3.63*  --  3.71* 1.93* 1.98*   < > 1.91* "   GLUCOSE 99  --   --  92  --   --  103* 124* 105* 109*  --  97 88 97   < > 94   CALCIUM 7.5*  --   --  7.5*  --   --  7.3* 7.7* 7.8* 7.6*  --  8.1* 8.1* 7.7*   < > 7.7*   ALBUMIN 2.7*  --   --  2.9*  --   --  2.6*  --  3.1*  --   --  3.5 2.7* 2.6*  --  2.7*   PHOS 4.4  --   --  4.7  --   --  4.6  --  4.8  --   --   --  3.4 3.6  --  3.8   EGFR 14*  --   --  14*  --   --  13* 13* 12* 11*  --  11*  --   --   --   --    ANIONGAP 15  --   --  15  --   --  12 15 15 17  --  16 15 13   < > 13    < > = values in this interval not displayed.        Urineanalysis  Recent Labs     02/02/25  1609 09/15/23  1700 09/15/23  1633 09/03/23  1050 10/26/22  1057 09/13/22  1352 08/10/20  1226   COLORU Light-Yellow YELLOW CANCELED YELLOW YELLOW YELLOW YELLOW   APPEARANCEU Clear HAZY CANCELED HAZY HAZY CLEAR HAZY   SPECGRAVU 1.017 1.017 CANCELED 1.016 1.017 >1.030* 1.012   BASHIR 5.5 8.0 CANCELED 6.0 5.0 5.5 5.0   PROTUR 200 (2+)* >=500(3+)* CANCELED >=500(3+)* 100(2+)* >300* NEGATIVE   GLUCOSEU Normal NEGATIVE CANCELED NEGATIVE NEGATIVE NEGATIVE NEGATIVE   BLOODU 0.03 (TRACE)* NEGATIVE CANCELED NEGATIVE NEGATIVE NEGATIVE NEGATIVE   KETONESU NEGATIVE NEGATIVE CANCELED NEGATIVE NEGATIVE NEGATIVE NEGATIVE   BILIRUBINU NEGATIVE NEGATIVE CANCELED NEGATIVE NEGATIVE NEGATIVE NEGATIVE   NITRITEU NEGATIVE NEGATIVE CANCELED NEGATIVE NEGATIVE NEGATIVE NEGATIVE   LEUKOCYTESU 25 Mirella/uL* LARGE(3+)* CANCELED NEGATIVE NEGATIVE NEGATIVE TRACE*       Urine Electrolytes  Recent Labs     02/04/25  1028 02/02/25  1609 09/15/23  1700 09/15/23  1637 09/15/23  1633 09/03/23  1050 10/26/22  1057 10/25/22  1127 09/13/22  1352 08/10/20  1226 05/16/19  1241 11/19/18  1423 01/23/18  1402 10/20/17  1047   SODIUMURR 103  --  48 CANCELED  --   --   --  42 98  --   --   --   --  77   NACREATUR 103  --  72 CANCELED  --   --   --  34 97  --   --   --   --  59   KUR 28  --  42  --   --   --   --   --   --   --   --   --   --   --    KCREATUR 28  --  63  --   --   --   --    "--   --   --   --   --   --   --    CREATU 100.1  --  66.4 CANCELED  --   --   --  124.0 101.0 55.1 79.4  --  140.0 130.0   PROTUR  --  200 (2+)* >=500(3+)*  --  CANCELED >=500(3+)* 100(2+)*  --  >300* NEGATIVE NEGATIVE   < > 100 (2+)* 30 (1+)*   MICROALBCREA  --   --   --   --   --   --   --   --   --  34.3* 96.0*  --  351.6*  --     < > = values in this interval not displayed.        Urine Micro  Recent Labs     02/02/25  1609 09/15/23  1700 09/03/23  1050 10/26/22  1057 08/10/20  1226 05/16/19  1241 11/19/18  1423 01/23/18  1402 10/20/17  1047   WBCU 6-10* >182* 3 1 4 2 2 67* 69*   RBCU 6-10* 10* 1 <1 1 None 1 3 1   HYALCASTU 2+*  --   --   --  2+*  --   --   --  1+*   SQUAMEPIU 1-9 (SPARSE)  --  <1  --   --   --  1 <1  --    BACTERIAU 1+*  --  1+*  --   --   --  1+* 3+* 3+*   MUCUSU  --   --  FEW FEW  --   --  1+ 1+ 1+        Iron  Recent Labs     08/21/23  1420 06/19/23  0836 09/06/22  0948 11/04/21  1126 05/12/21  1136 02/15/18  1002 11/17/17  1023   IRON 36  --  102 108 53 100 121   TIBC 210*  --  268 242 240 258 236*   IRONSAT 17*  --  38 45 22* 39 51*   FERRITIN 1,113* 1,093*  --   --   --   --   --        @Mg@    Lab Results   Component Value Date    WBC 9.9 02/06/2025    HGB 10.8 (L) 02/06/2025    HCT 35.2 (L) 02/06/2025    MCV 97 02/06/2025     02/06/2025       No results found for: \"CKTOTAL\", \"CKMB\", \"CKMBINDEX\", \"TROPONINI\"    Albumin/Creatine Ratio   Date Value Ref Range Status   08/10/2020 34.3 (H) 0.0 - 30.0 ug/mg crt Final   05/16/2019 96.0 (H) 0.0 - 30.0 ug/mg crt Final   01/23/2018 351.6 (H) 0.0 - 30.0 ug/mg crt Final     acetaminophen, 975 mg, oral, q8h  [Held by provider] amLODIPine, 5 mg, oral, Daily  aspirin, 81 mg, oral, Daily  B complex-vitamin C, 1 tablet, oral, Daily  benzonatate, 200 mg, oral, TID  carvedilol, 12.5 mg, oral, BID  cefepime, 1 g, intravenous, q24h  cetirizine, 5 mg, oral, Daily  [Held by provider] ferrous sulfate (325 mg ferrous sulfate), 65 mg of iron, oral, " Daily  gabapentin, 100 mg, oral, Nightly  heparin (porcine), 5,000 Units, subcutaneous, q8h LATA  lidocaine, 1 patch, transdermal, Daily  [Held by provider] polyethylene glycol, 17 g, oral, BID  pravastatin, 20 mg, oral, Daily  [Held by provider] sennosides-docusate sodium, 2 tablet, oral, BID  sodium bicarbonate, 650 mg, oral, BID  [Held by provider] torsemide, 10 mg, oral, Daily          Assessment/Plan        Camelia Jones is a 89 y.o. female  from ECF with PMH HTN, HLD, chronic anemia, CKD IV, chronic LE edema, pancreatitis, RLE DVT with post thrombotic syndrome, hx sacral ala / L5 fx, chronic hyponatremia who was admitted with altered mental status associated with poor p.o. fluid intake.  Nephrology team was consulted to manage hypernatremia        Assessment & Plan       # Chronic kidney disease stage IV most likely atherosclerotic cardiovascular disease.  Baseline serum creatinine 2.5-3     # Oliguric acute kidney injury with hypernatremia concerning for dehydration.  Currently serum creatinine is back to baseline with appropriate hydration through IV D5W     # Recent pneumonia-currently on antibiotics     # Hypertension-currently on carvedilol 12.5 mg twice daily.  Amlodipine and torsemide are on hold    # Chronic metabolic acidosis-on sodium bicarbonate     Recommendation plan  -Hypernatremia most like related to volume depletion/dehydration.  Now resolved with D5W.  I expect recurrent hypernatremia as she is not following appropriate hydration.  She needs at least 45 ounces of water intake at the nursing home  -Agree to continue to hold diuretics and amlodipine  -Pending urine electrolytes and serum osmolarity  -Strict I's/O    Okay to discharge kidney standpoint     Plan was discussed with primary team  I spent  minutes in the professional and overall care of this patient.        Abbie Galvan MD

## 2025-02-06 NOTE — PROGRESS NOTES
Occupational Therapy                 Therapy Communication Note    Patient Name: Camelia Jones  MRN: [id number]  Department: [address]  Room: [address] [place], [place] [address]  Today's Date: 2/6/2025     Discipline: Occupational Therapy    OT Missed Visit: Yes     Missed Visit Reason: Missed Visit Reason: Cancel (Pt dependent for ADLs and is from LTC with plan to return when medically appropriate. No further skilled OT needs identified.)    Missed Time: Cancel

## 2025-02-06 NOTE — CARE PLAN
The patient's goals for the shift include      The clinical goals for the shift include pt will remain free from SOB throughout shift    Over the shift, the patient did not make progress toward the following goals. Barriers to progression include acuteness of illness. Recommendations to address these barriers include hourly rounding, administration of prescribed treatments.

## 2025-02-07 ENCOUNTER — APPOINTMENT (OUTPATIENT)
Dept: CARDIOLOGY | Facility: HOSPITAL | Age: OVER 89
End: 2025-02-07
Payer: COMMERCIAL

## 2025-02-07 ENCOUNTER — APPOINTMENT (OUTPATIENT)
Dept: RADIOLOGY | Facility: HOSPITAL | Age: OVER 89
End: 2025-02-07
Payer: COMMERCIAL

## 2025-02-07 LAB
ALBUMIN SERPL BCP-MCNC: 2.6 G/DL (ref 3.4–5)
ANION GAP SERPL CALC-SCNC: 17 MMOL/L (ref 10–20)
BASOPHILS # BLD AUTO: 0.04 X10*3/UL (ref 0–0.1)
BASOPHILS NFR BLD AUTO: 0.6 %
BUN SERPL-MCNC: 52 MG/DL (ref 6–23)
CALCIUM SERPL-MCNC: 7.3 MG/DL (ref 8.6–10.3)
CHLORIDE SERPL-SCNC: 109 MMOL/L (ref 98–107)
CO2 SERPL-SCNC: 19 MMOL/L (ref 21–32)
CREAT SERPL-MCNC: 2.93 MG/DL (ref 0.5–1.05)
EGFRCR SERPLBLD CKD-EPI 2021: 15 ML/MIN/1.73M*2
EOSINOPHIL # BLD AUTO: 0.24 X10*3/UL (ref 0–0.4)
EOSINOPHIL NFR BLD AUTO: 3.5 %
ERYTHROCYTE [DISTWIDTH] IN BLOOD BY AUTOMATED COUNT: 15.2 % (ref 11.5–14.5)
GLUCOSE SERPL-MCNC: 89 MG/DL (ref 74–99)
HCT VFR BLD AUTO: 34.7 % (ref 36–46)
HGB BLD-MCNC: 10.3 G/DL (ref 12–16)
IMM GRANULOCYTES # BLD AUTO: 0.06 X10*3/UL (ref 0–0.5)
IMM GRANULOCYTES NFR BLD AUTO: 0.9 % (ref 0–0.9)
LYMPHOCYTES # BLD AUTO: 0.72 X10*3/UL (ref 0.8–3)
LYMPHOCYTES NFR BLD AUTO: 10.6 %
MAGNESIUM SERPL-MCNC: 2.09 MG/DL (ref 1.6–2.4)
MCH RBC QN AUTO: 29.4 PG (ref 26–34)
MCHC RBC AUTO-ENTMCNC: 29.7 G/DL (ref 32–36)
MCV RBC AUTO: 99 FL (ref 80–100)
MONOCYTES # BLD AUTO: 0.68 X10*3/UL (ref 0.05–0.8)
MONOCYTES NFR BLD AUTO: 10 %
NEUTROPHILS # BLD AUTO: 5.03 X10*3/UL (ref 1.6–5.5)
NEUTROPHILS NFR BLD AUTO: 74.4 %
NRBC BLD-RTO: 0 /100 WBCS (ref 0–0)
PHOSPHATE SERPL-MCNC: 4.6 MG/DL (ref 2.5–4.9)
PLATELET # BLD AUTO: 216 X10*3/UL (ref 150–450)
POTASSIUM SERPL-SCNC: 4.7 MMOL/L (ref 3.5–5.3)
RBC # BLD AUTO: 3.5 X10*6/UL (ref 4–5.2)
SODIUM SERPL-SCNC: 140 MMOL/L (ref 136–145)
VANCOMYCIN SERPL-MCNC: 13.2 UG/ML (ref 5–20)
WBC # BLD AUTO: 6.8 X10*3/UL (ref 4.4–11.3)

## 2025-02-07 PROCEDURE — 99232 SBSQ HOSP IP/OBS MODERATE 35: CPT | Performed by: INTERNAL MEDICINE

## 2025-02-07 PROCEDURE — 70450 CT HEAD/BRAIN W/O DYE: CPT | Performed by: RADIOLOGY

## 2025-02-07 PROCEDURE — 2500000002 HC RX 250 W HCPCS SELF ADMINISTERED DRUGS (ALT 637 FOR MEDICARE OP, ALT 636 FOR OP/ED): Performed by: INTERNAL MEDICINE

## 2025-02-07 PROCEDURE — 2500000001 HC RX 250 WO HCPCS SELF ADMINISTERED DRUGS (ALT 637 FOR MEDICARE OP): Performed by: INTERNAL MEDICINE

## 2025-02-07 PROCEDURE — 99233 SBSQ HOSP IP/OBS HIGH 50: CPT | Performed by: INTERNAL MEDICINE

## 2025-02-07 PROCEDURE — 1100000001 HC PRIVATE ROOM DAILY

## 2025-02-07 PROCEDURE — 2500000004 HC RX 250 GENERAL PHARMACY W/ HCPCS (ALT 636 FOR OP/ED): Performed by: INTERNAL MEDICINE

## 2025-02-07 PROCEDURE — 83735 ASSAY OF MAGNESIUM: CPT | Performed by: INTERNAL MEDICINE

## 2025-02-07 PROCEDURE — 85025 COMPLETE CBC W/AUTO DIFF WBC: CPT | Performed by: INTERNAL MEDICINE

## 2025-02-07 PROCEDURE — 80069 RENAL FUNCTION PANEL: CPT | Performed by: INTERNAL MEDICINE

## 2025-02-07 PROCEDURE — 94760 N-INVAS EAR/PLS OXIMETRY 1: CPT

## 2025-02-07 PROCEDURE — 93005 ELECTROCARDIOGRAM TRACING: CPT

## 2025-02-07 PROCEDURE — 70450 CT HEAD/BRAIN W/O DYE: CPT

## 2025-02-07 PROCEDURE — 97530 THERAPEUTIC ACTIVITIES: CPT | Mod: GP,CQ

## 2025-02-07 PROCEDURE — 36415 COLL VENOUS BLD VENIPUNCTURE: CPT | Performed by: INTERNAL MEDICINE

## 2025-02-07 PROCEDURE — 80202 ASSAY OF VANCOMYCIN: CPT | Performed by: INTERNAL MEDICINE

## 2025-02-07 PROCEDURE — 2500000005 HC RX 250 GENERAL PHARMACY W/O HCPCS: Performed by: INTERNAL MEDICINE

## 2025-02-07 RX ORDER — OLANZAPINE 5 MG/1
2.5 TABLET ORAL EVERY 6 HOURS PRN
Status: DISCONTINUED | OUTPATIENT
Start: 2025-02-07 | End: 2025-02-14 | Stop reason: HOSPADM

## 2025-02-07 RX ORDER — OLANZAPINE 10 MG/2ML
2.5 INJECTION, POWDER, FOR SOLUTION INTRAMUSCULAR EVERY 6 HOURS PRN
Status: DISCONTINUED | OUTPATIENT
Start: 2025-02-07 | End: 2025-02-14 | Stop reason: HOSPADM

## 2025-02-07 RX ORDER — VANCOMYCIN HYDROCHLORIDE 750 MG/150ML
750 INJECTION, SOLUTION INTRAVENOUS ONCE
Status: COMPLETED | OUTPATIENT
Start: 2025-02-07 | End: 2025-02-07

## 2025-02-07 RX ADMIN — OLANZAPINE 2.5 MG: 5 TABLET, FILM COATED ORAL at 18:40

## 2025-02-07 RX ADMIN — Medication 1 TABLET: at 09:00

## 2025-02-07 RX ADMIN — BENZONATATE 200 MG: 100 CAPSULE ORAL at 16:30

## 2025-02-07 RX ADMIN — BENZONATATE 200 MG: 100 CAPSULE ORAL at 20:43

## 2025-02-07 RX ADMIN — ASPIRIN 81 MG: 81 TABLET, COATED ORAL at 09:00

## 2025-02-07 RX ADMIN — ACETAMINOPHEN 975 MG: 325 TABLET, FILM COATED ORAL at 16:30

## 2025-02-07 RX ADMIN — SODIUM BICARBONATE 650 MG: 650 TABLET ORAL at 20:43

## 2025-02-07 RX ADMIN — ACETAMINOPHEN 975 MG: 325 TABLET, FILM COATED ORAL at 05:30

## 2025-02-07 RX ADMIN — CETIRIZINE HYDROCHLORIDE 5 MG: 10 TABLET, FILM COATED ORAL at 09:00

## 2025-02-07 RX ADMIN — PRAVASTATIN SODIUM 20 MG: 40 TABLET ORAL at 20:42

## 2025-02-07 RX ADMIN — HEPARIN SODIUM 5000 UNITS: 5000 INJECTION INTRAVENOUS; SUBCUTANEOUS at 09:00

## 2025-02-07 RX ADMIN — CARVEDILOL 12.5 MG: 12.5 TABLET, FILM COATED ORAL at 20:43

## 2025-02-07 RX ADMIN — ACETAMINOPHEN 975 MG: 325 TABLET, FILM COATED ORAL at 20:43

## 2025-02-07 RX ADMIN — CARVEDILOL 12.5 MG: 12.5 TABLET, FILM COATED ORAL at 09:00

## 2025-02-07 RX ADMIN — LIDOCAINE 4% 1 PATCH: 40 PATCH TOPICAL at 08:59

## 2025-02-07 RX ADMIN — SODIUM BICARBONATE 650 MG: 650 TABLET ORAL at 09:01

## 2025-02-07 RX ADMIN — VANCOMYCIN HYDROCHLORIDE 750 MG: 750 INJECTION, SOLUTION INTRAVENOUS at 10:11

## 2025-02-07 RX ADMIN — HEPARIN SODIUM 5000 UNITS: 5000 INJECTION INTRAVENOUS; SUBCUTANEOUS at 16:30

## 2025-02-07 RX ADMIN — GABAPENTIN 100 MG: 100 CAPSULE ORAL at 20:43

## 2025-02-07 RX ADMIN — CEFEPIME HYDROCHLORIDE 1 G: 1 INJECTION, SOLUTION INTRAVENOUS at 17:57

## 2025-02-07 RX ADMIN — BENZONATATE 200 MG: 100 CAPSULE ORAL at 09:00

## 2025-02-07 ASSESSMENT — COGNITIVE AND FUNCTIONAL STATUS - GENERAL
PERSONAL GROOMING: A LITTLE
MOVING TO AND FROM BED TO CHAIR: A LOT
WALKING IN HOSPITAL ROOM: A LOT
DRESSING REGULAR UPPER BODY CLOTHING: A LITTLE
DAILY ACTIVITIY SCORE: 15
CLIMB 3 TO 5 STEPS WITH RAILING: A LOT
STANDING UP FROM CHAIR USING ARMS: A LOT
DRESSING REGULAR UPPER BODY CLOTHING: A LITTLE
WALKING IN HOSPITAL ROOM: A LOT
MOVING FROM LYING ON BACK TO SITTING ON SIDE OF FLAT BED WITH BEDRAILS: A LITTLE
MOVING FROM LYING ON BACK TO SITTING ON SIDE OF FLAT BED WITH BEDRAILS: A LOT
MOBILITY SCORE: 13
TURNING FROM BACK TO SIDE WHILE IN FLAT BAD: A LOT
DRESSING REGULAR LOWER BODY CLOTHING: A LOT
MOVING TO AND FROM BED TO CHAIR: A LOT
TOILETING: TOTAL
PERSONAL GROOMING: A LITTLE
STANDING UP FROM CHAIR USING ARMS: TOTAL
MOVING TO AND FROM BED TO CHAIR: TOTAL
MOBILITY SCORE: 8
DAILY ACTIVITIY SCORE: 15
TURNING FROM BACK TO SIDE WHILE IN FLAT BAD: A LOT
HELP NEEDED FOR BATHING: A LOT
DRESSING REGULAR LOWER BODY CLOTHING: A LOT
MOVING FROM LYING ON BACK TO SITTING ON SIDE OF FLAT BED WITH BEDRAILS: A LITTLE
TURNING FROM BACK TO SIDE WHILE IN FLAT BAD: A LOT
WALKING IN HOSPITAL ROOM: TOTAL
CLIMB 3 TO 5 STEPS WITH RAILING: A LOT
MOBILITY SCORE: 13
STANDING UP FROM CHAIR USING ARMS: A LOT
HELP NEEDED FOR BATHING: A LOT
CLIMB 3 TO 5 STEPS WITH RAILING: TOTAL
TOILETING: TOTAL

## 2025-02-07 ASSESSMENT — PAIN SCALES - GENERAL
PAINLEVEL_OUTOF10: 0 - NO PAIN

## 2025-02-07 NOTE — PROGRESS NOTES
02/07/25 0812   Discharge Planning   Living Arrangements Other (Comment)  (from [facility])   Support Systems Children   Assistance Needed baseline A&Ox3-4;assist for ADLs and self propels in wheelchair; room air baseline and currently room air   Type of Residence Nursing home/residential care   Do you have animals or pets at home? No   Home or Post Acute Services Post acute facilities (Rehab/SNF/etc)   Type of Post Acute Facility Services Long term care   Expected Discharge Disposition Inter  (Spoke with pt's dtr at the bedside, preference for pt to return to [facility] when medically ready. No precert)   Does the patient need discharge transport arranged? Yes   RoundTrip coordination needed? Yes   Has discharge transport been arranged? No

## 2025-02-07 NOTE — PROGRESS NOTES
Vancomycin Dosing by Pharmacy- FOLLOW UP  Camelia Jones is a 89 y.o. year old female who Pharmacy has been consulted for vancomycin dosing for pneumonia. Based on the patient's indication and renal status this patient is being dosed based on a goal trough/random level of 15-20 - DOSE BY LEVEL    Renal function is currently improving. Scr decreased 3.63 > 3.47 > 3.35 > 3.27 > 3.14 > 3.13 > 2.93.     Current vancomycin dose: 750 mg given once x2 (2/3 and ), 500 mg once x1 (), no dose ().     Most recent random level: 13.2 mcg/mL    Visit Vitals  /87 (BP Location: Right arm, Patient Position: Lying)   Pulse 75   Temp 36.6 °C (97.9 °F) (Temporal)   Resp 18        Lab Results   Component Value Date    CREATININE 2.93 (H) 2025    CREATININE 3.13 (H) 2025    CREATININE 3.14 (H) 2025    CREATININE 3.27 (H) 2025        Patient weight is as follows:   Vitals:    25 0422   Weight: 61.3 kg (135 lb 2.3 oz)       Cultures:  No results found for the encounter in last 14 days.       I/O last 3 completed shifts:  In: 1201.7 (19.6 mL/kg) [P.O.:680; I.V.:471.7 (7.7 mL/kg); IV Piggyback:50]  Out: 525 (8.6 mL/kg) [Urine:525 (0.2 mL/kg/hr)]  Weight: 61.3 kg   I/O during current shift:  No intake/output data recorded.    Temp (24hrs), Av.9 °C (98.5 °F), Min:36.6 °C (97.9 °F), Max:37.3 °C (99.1 °F)      Assessment/Plan  Will give 750mg once on  at 10:00.   The next level will be obtained on  at 05:00. May be obtained sooner if clinically indicated.   Will continue to monitor renal function daily while on vancomycin and order serum creatinine at least every 48 hours if not already ordered.  Follow for continued vancomycin needs, clinical response, and signs/symptoms of toxicity.       Padma Grady, PharmD

## 2025-02-07 NOTE — PROGRESS NOTES
"Camelia Jones is a 89 y.o. female on day 5 of admission presenting with Hypernatremia.      Subjective   Patient was seen and examined today.  Sodium improved today discussed appropriate hydration with her-she adamantly refusing drinking water? seems like she does not like the taste of water.  I recommended 45 ounces minimal fluid intake-she replied \"this is not happening \".  She remains to be confused       Objective          Vitals 24HR  Heart Rate:  [70-84]   Temp:  [36.6 °C (97.9 °F)-37.3 °C (99.1 °F)]   Resp:  [16-18]   BP: (104-173)/()   Weight:  [61.3 kg (135 lb 2.3 oz)]   SpO2:  [95 %-98 %]         Intake/Output last 3 Shifts:    Intake/Output Summary (Last 24 hours) at 2/7/2025 0947  Last data filed at 2/7/2025 0947  Gross per 24 hour   Intake 820 ml   Output 350 ml   Net 470 ml       Physical Exam      General appearance: Elderly, confused, euvolemic on exam  Eyes: non-icteric  HEENT: atrumatic head, PEERLA, dry mucosa  Skin: no apparent rash  Heart: NSR, S1, S2 normal, no murmur or gallop  Lungs: Symmetrical expansion,CTA bilat no wheezing/crackles  Abdomen: soft, nt/nd, obese  Extremities: no edema bilat  Neuro: No FND,asterixis, no focal deficits noticed     RFP  Recent Labs     02/07/25  0747 02/06/25  0634 02/05/25  2120 02/05/25  1522 02/05/25  0705 02/05/25  0002 02/04/25  1613 02/04/25  0653 02/03/25  1419 02/03/25  0609 02/02/25  1947 02/02/25  1536 02/02/25  1536 09/21/23  0536 09/20/23  0524    143 144 145 146*  146* 146* 139 149* 149* 150* 149*   < > 151* 128* 129*   K 4.7 4.0  --   --  4.1  --   --  4.4 4.1 4.1 4.3   < > 4.4 4.3 4.2   * 111*  --   --  114*  --   --  116* 114* 115* 116*   < > 114* 99 100   CO2 19* 21  --   --  21  --   --  25 24 24 20*   < > 25 18* 20*   BUN 52* 56*  --   --  59*  --   --  62* 70* 72* 76*   < > 78* 32* 34*   CREATININE 2.93* 3.13*  --   --  3.14*  --   --  3.27* 3.35* 3.47* 3.63*   < > 3.71* 1.93* 1.98*   GLUCOSE 89 99  --   --  92  --   -- "  103* 124* 105* 109*   < > 97 88 97   CALCIUM 7.3* 7.5*  --   --  7.5*  --   --  7.3* 7.7* 7.8* 7.6*   < > 8.1* 8.1* 7.7*   ALBUMIN 2.6* 2.7*  --   --  2.9*  --   --  2.6*  --  3.1*  --   --  3.5 2.7* 2.6*   PHOS 4.6 4.4  --   --  4.7  --   --  4.6  --  4.8  --   --   --  3.4 3.6   EGFR 15* 14*  --   --  14*  --   --  13* 13* 12* 11*   < > 11*  --   --    ANIONGAP 17 15  --   --  15  --   --  12 15 15 17   < > 16 15 13    < > = values in this interval not displayed.        Urineanalysis  Recent Labs     02/02/25  1609 09/15/23  1700 09/15/23  1633 09/03/23  1050 10/26/22  1057 09/13/22  1352 08/10/20  1226   COLORU Light-Yellow YELLOW CANCELED YELLOW YELLOW YELLOW YELLOW   APPEARANCEU Clear HAZY CANCELED HAZY HAZY CLEAR HAZY   SPECGRAVU 1.017 1.017 CANCELED 1.016 1.017 >1.030* 1.012   BASHIR 5.5 8.0 CANCELED 6.0 5.0 5.5 5.0   PROTUR 200 (2+)* >=500(3+)* CANCELED >=500(3+)* 100(2+)* >300* NEGATIVE   GLUCOSEU Normal NEGATIVE CANCELED NEGATIVE NEGATIVE NEGATIVE NEGATIVE   BLOODU 0.03 (TRACE)* NEGATIVE CANCELED NEGATIVE NEGATIVE NEGATIVE NEGATIVE   KETONESU NEGATIVE NEGATIVE CANCELED NEGATIVE NEGATIVE NEGATIVE NEGATIVE   BILIRUBINU NEGATIVE NEGATIVE CANCELED NEGATIVE NEGATIVE NEGATIVE NEGATIVE   NITRITEU NEGATIVE NEGATIVE CANCELED NEGATIVE NEGATIVE NEGATIVE NEGATIVE   LEUKOCYTESU 25 Mirella/uL* LARGE(3+)* CANCELED NEGATIVE NEGATIVE NEGATIVE TRACE*       Urine Electrolytes  Recent Labs     02/04/25  1028 02/02/25  1609 09/15/23  1700 09/15/23  1637 09/15/23  1633 09/03/23  1050 10/26/22  1057 10/25/22  1127 09/13/22  1352 08/10/20  1226 05/16/19  1241 11/19/18  1423 01/23/18  1402 10/20/17  1047   SODIUMURR 103  --  48 CANCELED  --   --   --  42 98  --   --   --   --  77   NACREATUR 103  --  72 CANCELED  --   --   --  34 97  --   --   --   --  59   KUR 28  --  42  --   --   --   --   --   --   --   --   --   --   --    KCREATUR 28  --  63  --   --   --   --   --   --   --   --   --   --   --    CREATU 100.1  --  66.4 CANCELED   "--   --   --  124.0 101.0 55.1 79.4  --  140.0 130.0   PROTUR  --  200 (2+)* >=500(3+)*  --  CANCELED >=500(3+)* 100(2+)*  --  >300* NEGATIVE NEGATIVE   < > 100 (2+)* 30 (1+)*   MICROALBCREA  --   --   --   --   --   --   --   --   --  34.3* 96.0*  --  351.6*  --     < > = values in this interval not displayed.        Urine Micro  Recent Labs     02/02/25  1609 09/15/23  1700 09/03/23  1050 10/26/22  1057 08/10/20  1226 05/16/19  1241 11/19/18  1423 01/23/18  1402 10/20/17  1047   WBCU 6-10* >182* 3 1 4 2 2 67* 69*   RBCU 6-10* 10* 1 <1 1 None 1 3 1   HYALCASTU 2+*  --   --   --  2+*  --   --   --  1+*   SQUAMEPIU 1-9 (SPARSE)  --  <1  --   --   --  1 <1  --    BACTERIAU 1+*  --  1+*  --   --   --  1+* 3+* 3+*   MUCUSU  --   --  FEW FEW  --   --  1+ 1+ 1+        Iron  Recent Labs     08/21/23  1420 06/19/23  0836 09/06/22  0948 11/04/21  1126 05/12/21  1136 02/15/18  1002 11/17/17  1023   IRON 36  --  102 108 53 100 121   TIBC 210*  --  268 242 240 258 236*   IRONSAT 17*  --  38 45 22* 39 51*   FERRITIN 1,113* 1,093*  --   --   --   --   --        @Mg@    Lab Results   Component Value Date    WBC 6.8 02/07/2025    HGB 10.3 (L) 02/07/2025    HCT 34.7 (L) 02/07/2025    MCV 99 02/07/2025     02/07/2025       No results found for: \"CKTOTAL\", \"CKMB\", \"CKMBINDEX\", \"TROPONINI\"    Albumin/Creatine Ratio   Date Value Ref Range Status   08/10/2020 34.3 (H) 0.0 - 30.0 ug/mg crt Final   05/16/2019 96.0 (H) 0.0 - 30.0 ug/mg crt Final   01/23/2018 351.6 (H) 0.0 - 30.0 ug/mg crt Final     acetaminophen, 975 mg, oral, q8h  [Held by provider] amLODIPine, 5 mg, oral, Daily  aspirin, 81 mg, oral, Daily  B complex-vitamin C, 1 tablet, oral, Daily  benzonatate, 200 mg, oral, TID  carvedilol, 12.5 mg, oral, BID  cefepime, 1 g, intravenous, q24h  cetirizine, 5 mg, oral, Daily  [Held by provider] ferrous sulfate (325 mg ferrous sulfate), 65 mg of iron, oral, Daily  gabapentin, 100 mg, oral, Nightly  heparin (porcine), 5,000 Units, " subcutaneous, q8h LATA  lidocaine, 1 patch, transdermal, Daily  [Held by provider] polyethylene glycol, 17 g, oral, BID  pravastatin, 20 mg, oral, Daily  [Held by provider] sennosides-docusate sodium, 2 tablet, oral, BID  sodium bicarbonate, 650 mg, oral, BID  [Held by provider] torsemide, 10 mg, oral, Daily  vancomycin, 750 mg, intravenous, Once          Assessment/Plan        Camelia Jones is a 89 y.o. female  from ECF with PMH HTN, HLD, chronic anemia, CKD IV, chronic LE edema, pancreatitis, RLE DVT with post thrombotic syndrome, hx sacral ala / L5 fx, chronic hyponatremia who was admitted with altered mental status associated with poor p.o. fluid intake.  Nephrology team was consulted to manage hypernatremia        Assessment & Plan       # Chronic kidney disease stage IV most likely atherosclerotic cardiovascular disease.  Baseline serum creatinine 2.5-3.  Currently she is back to her baseline     # Oliguric acute kidney injury with hypernatremia concerning for dehydration.  Currently serum creatinine is back to baseline with appropriate hydration through IV D5W     # Recent pneumonia-currently on antibiotics     # Hypertension-currently on carvedilol 12.5 mg twice daily.  Amlodipine and torsemide are on hold    # Chronic metabolic acidosis-on sodium bicarbonate     Recommendation plan  -Hypernatremia most like related to volume depletion/dehydration.  Now resolved and sodium is normal.  I expect recurrent hypernatremia as she is not following appropriate hydration.  She needs at least 45 ounces of water intake at the nursing home  -Can resume amlodipine for blood pressure control if needed.  Keep diuretics on hold  -Pending urine electrolytes and serum osmolarity  -Strict I's/O  -She will need to follow with nephrology as an outpatient for advanced CKD and 1-3 months postdischarge    Okay to discharge kidney standpoint  We will sign off.  Please let us know if you can be of any further assistance     Plan was  discussed with primary team  I spent  minutes in the professional and overall care of this patient.        Abbie Galvan MD

## 2025-02-07 NOTE — PROGRESS NOTES
"  Subjective    Denies chest pain, shortness of breath, nausea, vomiting or diarrhea.  Daughter is also at the bedside and states patient seems a little better today with her mentation than yesterday.    Objective    Vitals  Visit Vitals  /87 (BP Location: Right arm, Patient Position: Lying)   Pulse 75   Temp 36.6 °C (97.9 °F) (Temporal)   Resp 18   Ht 1.575 m (5' 2.01\")   Wt 61.3 kg (135 lb 2.3 oz)   SpO2 96%   BMI 24.71 kg/m²   OB Status Postmenopausal   Smoking Status Never   BSA 1.64 m²       Physical Exam   General: Alert and oriented to self, birthday, but not to the yr or the president. NAD.   HEENT: Sclera clear.  Pupils are equal and reactive to light bilaterally.  CVS: RRR.   Lungs: CTAB.  Abdomen: Soft.  NT. +BS.      Extremities: No significant pitting edema bilateral ankles.    Psychiatric: Cooperative.  Neurologic: Strength is 5/5 in all extremities.  Cranial nerves II through XII appear to be grossly intact.  Finger-to-nose exam intact bilaterally.  Sensation appears equal in all 4 extremities.     IOs    Intake/Output Summary (Last 24 hours) at 2/7/2025 1340  Last data filed at 2/7/2025 0947  Gross per 24 hour   Intake 770 ml   Output 350 ml   Net 420 ml       Labs:   Results from last 72 hours   Lab Units 02/07/25  0747 02/06/25  0634 02/05/25  2120 02/05/25  1522 02/05/25  0705   SODIUM mmol/L 140 143 144   < > 146*  146*   POTASSIUM mmol/L 4.7 4.0  --   --  4.1   CHLORIDE mmol/L 109* 111*  --   --  114*   CO2 mmol/L 19* 21  --   --  21   BUN mg/dL 52* 56*  --   --  59*   CREATININE mg/dL 2.93* 3.13*  --   --  3.14*   GLUCOSE mg/dL 89 99  --   --  92   CALCIUM mg/dL 7.3* 7.5*  --   --  7.5*   ANION GAP mmol/L 17 15  --   --  15   EGFR mL/min/1.73m*2 15* 14*  --   --  14*   PHOSPHORUS mg/dL 4.6 4.4  --   --  4.7    < > = values in this interval not displayed.      Results from last 72 hours   Lab Units 02/07/25  0747 02/06/25  0634 02/05/25  0705   WBC AUTO x10*3/uL 6.8 9.9 8.7   HEMOGLOBIN " g/dL 10.3* 10.8* 12.1   HEMATOCRIT % 34.7* 35.2* 38.4   PLATELETS AUTO x10*3/uL 216 241 223   NEUTROS PCT AUTO % 74.4 80.5 79.9   LYMPHS PCT AUTO % 10.6 7.6 10.7   MONOS PCT AUTO % 10.0 8.0 7.1   EOS PCT AUTO % 3.5 2.8 1.1      Lab Results   Component Value Date    CALCIUM 7.3 (L) 02/07/2025    PHOS 4.6 02/07/2025      Lab Results   Component Value Date    CRP 5.67 (H) 02/04/2025      [unfilled]       Images  US chest  Narrative: Interpreted By:  Dionne Garcia,   STUDY:  US CHEST; ;  2/3/2025 10:23 am      INDICATION:  Signs/Symptoms:left pleural effusion.          COMPARISON:  None.      ACCESSION NUMBER(S):  [id number]      ORDERING CLINICIAN:  PRATIK ROWLEY      TECHNIQUE:  Limited ultrasound evaluation of the left chest cavity was obtained.      FINDINGS:  Limited ultrasound evaluation of the left chest cavity was obtained.  There is trace amount of pleural effusion identified.      Impression: Trace amount of left pleural effusion, insufficient for  ultrasound-guided thoracentesis. Clinical correlation and follow-up  recommended.          MACRO:  None      Signed by: Dionne Garcia 2/3/2025 3:07 PM  Dictation workstation:   [id number]  ECG 12 lead  Normal sinus rhythm  Left axis deviation  Left ventricular hypertrophy with repolarization abnormality ( R in aVL )  Abnormal ECG  When compared with ECG of 15-SEP-2023 12:47,  Previous ECG has undetermined rhythm, needs review  Criteria for Septal infarct are no longer Present      Meds  Scheduled medications  acetaminophen, 975 mg, oral, q8h  [Held by provider] amLODIPine, 5 mg, oral, Daily  aspirin, 81 mg, oral, Daily  B complex-vitamin C, 1 tablet, oral, Daily  benzonatate, 200 mg, oral, TID  carvedilol, 12.5 mg, oral, BID  cefepime, 1 g, intravenous, q24h  cetirizine, 5 mg, oral, Daily  [Held by provider] ferrous sulfate (325 mg ferrous sulfate), 65 mg of iron, oral, Daily  gabapentin, 100 mg, oral, Nightly  heparin (porcine), 5,000 Units, subcutaneous, q8h  LATA  lidocaine, 1 patch, transdermal, Daily  [Held by provider] polyethylene glycol, 17 g, oral, BID  pravastatin, 20 mg, oral, Daily  [Held by provider] sennosides-docusate sodium, 2 tablet, oral, BID  sodium bicarbonate, 650 mg, oral, BID  [Held by provider] torsemide, 10 mg, oral, Daily      Continuous medications     PRN medications  PRN medications: dextromethorphan-guaifenesin, ipratropium-albuteroL, ondansetron, prochlorperazine, vancomycin     Assessment and Plan    Camelia Jones is a 89 y.o. female from [facility] with PMH HTN, HLD, chronic anemia, CKD IV, chronic LE edema, pancreatitis, RLE DVT with post thrombotic syndrome, hx sacral ala / L5 fx, chronic hyponatremia, admitted with acute metabolic encephalopathy with azotemia with suspected uremic syndrome; WARREN/CKD stage IV; hypernatremia; bilateral pleural effusion; recent pneumonia.      Acute metabolic encephalopathy  -Likely secondary to dehydration with hypernatremia, acute kidney injury and pneumonia.  -Continue to treat underlying hyponatremia, WARREN and pneumonia.  -Patient's daughter is at the bedside and states her mental status is a little better today than yesterday.  I also agree it does seem a little better today than yesterday.  On exam, no obvious gross focal neurologic deficits.  -Will repeat a CT scan of the head today without contrast just to see if there is been any changes then CT on admission.  It may be that patient has some underlying dementia and due to her acute events and being on a foreign environment that she may have more issues with altered mental status.  -Will place on Zyprexa 2.5 mg every 6 hours as needed and reviewed EKG from 2/2/2025 with QTc interval of 471 MS.  -Monitor.     WARREN with CKD stage IV  -On oral bicarb tabs.  -Nephrology consulted and feels that WARREN likely secondary to volume depletion/dehydration.    -We changed IV fluids to D5W on 2/4, but due to drop in sodium, we discontinued IV fluids on 2/4.    -Restarted  IV fluids with D5W at 50 cc/h on 2/5 and stopped ivf's on 2/6.   -Creatinine down to 2.93 today; 3.13 on 2/6; 3.14 on 2/5; 3.27 on 2/4.  -Renal function is around recent baseline.  -I discussed case with nephrology today, 2/7, as well.     Hypernatremia  -Nephrology consulted and feels that hypernatremia likely secondary to volume depletion/dehydration.  Nephrology initially recommended to change IV fluids to D5W at 100 cc/h and recheck sodium every 8 hours on 2/4.  -As sodium had dropped to 139 from 149 in the same day, we stopped D5W.  Sodium trended up to 146 just after midnight on 2/5.  So, not sure if lab drawn close to where IV fluids were being administered, but we did stop IV fluids on 2/4.    -Restarted IV fluids with D5W at 50 cc/h on 2/5 and subsequently discontinued IV fluids on 2/6.  -Sodium at 140 today; 143 on 2/6.  -Nephrology recommends for patient to drink at least 45 ounces of water intake daily at the nursing home.  -Monitor.     Bilateral pleural effusion with pneumonia  -Patient had been on Invanz prior to admission and during this admission and also started on vancomycin during this admission.  -ID consulted and recommends to continue cefepime and continue IV vancomycin.  -Continue aspiration precautions.  -Monitor.     Hypertension  -Holding home meds of amlodipine and torsemide.  -Continue Coreg.   -Nephrology is okay to restart amlodipine if needed but recommends to continue to hold torsemide.  -His BP is up-and-down at times, will continue to hold amlodipine, but may need to restart if sustained elevated BP.  -Monitor.     Hyperlipidemia  -Continue pravastatin.     Chronic anemia  -Hemoglobin appears to be stable.    -Monitor.     Right lower extremity DVT with post thrombotic syndrome  -Monitor.     DVT prophylaxis  -Heparin subcu.

## 2025-02-07 NOTE — PROGRESS NOTES
Physical Therapy    Physical Therapy Treatment    Patient Name: Camelia Jones  MRN: [id number]  Department: [facility]  Room: [address] [place], [place] [address]  Today's Date: 2/7/2025  Time Calculation  Start Time: 1340  Stop Time: 1403  Time Calculation (min): 23 min         Assessment/Plan   PT Assessment  PT Assessment Results: Decreased cognition, Decreased mobility, Decreased strength, Impaired balance, Pain, Decreased endurance, Impaired judgement  Rehab Prognosis: Fair  Barriers to Discharge Home: Cognition needs, Physical needs, Caregiver assistance  Caregiver Assistance: Caregiver assistance needed per identified barriers - however, level of patient's required assistance exceeds assistance available at home  Cognition Needs: Cognition-related high falls risk, 24hr supervision for safety awareness needed  Physical Needs: 24hr mobility assistance needed, 24hr ADL assistance needed, Returning to long-term care/other facility  End of Session Communication: Bedside nurse, PCT/NA/CTA  Assessment Comment: continue to recommend Mod intensity skilled PT at DC for return to PLOF.  End of Session Patient Position: Alarm on, Bed, 3 rail up     PT Plan  Treatment/Interventions: Bed mobility, Transfer training, Gait training, Therapeutic exercise, Therapeutic activity  PT Plan: Ongoing PT  PT Frequency: 3 times per week  PT Discharge Recommendations: Moderate intensity level of continued care  Equipment Recommended upon Discharge: Wheeled walker (owns)  PT Recommended Transfer Status: Assist x2  PT - OK to Discharge: Yes      General Visit Information:   PT  Visit  PT Received On: 02/07/25  General  Reason for Referral: 89 yo female admit with worsening mentation- UTI/PNA; referred to PT for impaired mobility/gait training  Referred By: Bear Silveira DO  Past Medical History Relevant to Rehab: PMH HTN, HLD, chronic anemia, CKD IV, chronic LE edema, pancreatitis, RLE DVT with post thrombotic syndrome, hx sacral ala / L5 fx, chronic  "hyponatremia.  Family/Caregiver Present: No  Prior to Session Communication: Bedside nurse  Patient Position Received: Alarm on, Bed, 3 rail up (Dtr present and very attentive)  General Comment: AXOX2, confused. HOB fairly flat, patient lying cross wise in bed with feet off bed. \"here let me sit up\" stated patient, yet gave no effort to sitting up, (Lidocaine patch placed to low back per Idalia RN who also assisted with pateint attempted mobilityon.)    Subjective   Precautions:  Precautions  Medical Precautions: Fall precautions            Objective   Pain:  Pain Assessment  0-10 (Numeric) Pain Score: 0 - No pain (\"no pain resting here, my knees hurt, my low back, this right shoulder, my legs only when I move or they get touched or something\" stated patient.)  Pain Interventions: Repositioned, Distraction  Response to Interventions: Decrease in pain  Cognition:  Cognition  Orientation Level: Disoriented to time, Disoriented to situation, Disoriented to place  Coordination:     Postural Control:  Postural Control  Postural Control: Impaired  Posture Comment:  (retro leans and resists asssitance to sit upright. generlzied tremors, instability in sitting.)  Static Sitting Balance  Static Sitting-Balance Support: Feet unsupported, Bilateral upper extremity supported, No upper extremity supported  Static Sitting-Level of Assistance: Maximum assistance, Contact guard (patient retro leans, pushes and resists tactile cues, does not follow VC. verbalizes \"I'm gonna fall, I dont want to fall\" unable to re-assure, fatigues quickly. took Max x2 to get from supine to sit.)  Static Sitting-Comment/Number of Minutes: 4 minutes  Extremity/Trunk Assessments:    Activity Tolerance:  Activity Tolerance  Endurance: Tolerates less than 10 min exercise, no significant change in vital signs, Tolerates 10 - 20 min exercise with multiple rests  Treatments:            Therapeutic Activity  Therapeutic Activity Performed: Yes  Therapeutic " Activity 1: when asked to complete AROM bilat LE's and UE's , conor would complete without any c/o pain, if this PTA touched extremities pateint would holler out in pain. Very difficult to keep on task and follow commands.                   Bed Mobility  Bed Mobility: Yes  Bed Mobility 1  Bed Mobility 1: Supine to sitting, Sitting to supine, Scooting  Level of Assistance 1: +2, Maximum assistance  Bed Mobility 2  Bed Mobility  2: Rolling right, Rolling left  Level of Assistance 2: Maximum assistance, Maximum verbal cues, Maximum tactile cues, +2    Ambulation/Gait Training  Ambulation/Gait Training Performed: No  Transfers  Transfer: No    Stairs  Stairs: No                     Outcome Measures:  [facility] Basic Mobility  Turning from your back to your side while in a flat bed without using bedrails: A lot  Moving from lying on your back to sitting on the side of a flat bed without using bedrails: A lot  Moving to and from bed to chair (including a wheelchair): Total  Standing up from a chair using your arms (e.g. wheelchair or bedside chair): Total  To walk in hospital room: Total  Climbing 3-5 steps with railing: Total  Basic Mobility - Total Score: 8    Education Documentation  Mobility Training, taught by Valentine Cisse PTA at 2/7/2025  2:31 PM.  Learner: Patient  Readiness: Nonacceptance  Method: Explanation, Demonstration  Response: Needs Reinforcement  Comment: pateint confused, no carry over    Education Comments  No comments found.        OP EDUCATION:       Encounter Problems       Encounter Problems (Active)       Balance       STG - Maintains static sitting balance at EOB with upper extremity support modified IND x 10 min with SUPERVISION to progress toward PLOF  (Progressing)       Start:  02/03/25    Expected End:  02/17/25       INTERVENTIONS:  1. Practice sitting on the edge of a bed/mat with minimal support.  2. Educate patient about maintining total hip precautions while maintaining balance.  3.  Educate patient about pressure relief.  4. Educate patient about use of assistive device.            Mobility       STG - Patient will ambulate >100 ft with FWW SUPERVISION  (Not Progressing)       Start:  02/03/25    Expected End:  02/17/25               PT Transfers       STG - Transfer from bed to chair with SUPERVISION  (Not Progressing)       Start:  02/03/25    Expected End:  02/17/25            STG - Patient will perform bed mobility modified IND (Progressing)       Start:  02/03/25    Expected End:  02/17/25            STG - Patient will transfer sit to and from stand with FWW to/from various surfaces with SUPERVISION  (Progressing)       Start:  02/03/25    Expected End:  02/17/25               Pain - Adult

## 2025-02-07 NOTE — PROGRESS NOTES
Camelia Jones is a 89 y.o. female on day 5 of admission presenting with Hypernatremia.    Subjective   Interval History: no fever, no new complaints        Review of Systems    Objective   Range of Vitals (last 24 hours)  Heart Rate:  [70-84]   Temp:  [36.6 °C (97.9 °F)-37.3 °C (99.1 °F)]   Resp:  [16-18]   BP: (104-173)/()   Weight:  [61.3 kg (135 lb 2.3 oz)]   SpO2:  [95 %-98 %]   Daily Weight  02/07/25 : 61.3 kg (135 lb 2.3 oz)    Body mass index is 24.71 kg/m².    Physical Exam  Constitutional:       Appearance: Normal appearance.   HENT:      Head: Normocephalic and atraumatic.      Mouth/Throat:      Mouth: Mucous membranes are moist.      Pharynx: Oropharynx is clear.   Eyes:      Pupils: Pupils are equal, round, and reactive to light.   Cardiovascular:      Rate and Rhythm: Normal rate and regular rhythm.      Heart sounds: Normal heart sounds.   Pulmonary:      Effort: Pulmonary effort is normal.      Breath sounds: Normal breath sounds.   Abdominal:      General: Abdomen is flat. Bowel sounds are normal.      Palpations: Abdomen is soft.   Musculoskeletal:      Cervical back: Normal range of motion.   Neurological:      Mental Status: She is alert.         Antibiotics  cefepime - 1 gram/50 mL  ertapenem - 1 gram  vancomycin    Relevant Results  Labs  Results from last 72 hours   Lab Units 02/07/25  0747 02/06/25  0634 02/05/25  0705   WBC AUTO x10*3/uL 6.8 9.9 8.7   HEMOGLOBIN g/dL 10.3* 10.8* 12.1   HEMATOCRIT % 34.7* 35.2* 38.4   PLATELETS AUTO x10*3/uL 216 241 223   NEUTROS PCT AUTO % 74.4 80.5 79.9   LYMPHS PCT AUTO % 10.6 7.6 10.7   MONOS PCT AUTO % 10.0 8.0 7.1   EOS PCT AUTO % 3.5 2.8 1.1     Results from last 72 hours   Lab Units 02/07/25  0747 02/06/25  0634 02/05/25  2120 02/05/25  1522 02/05/25  0705   SODIUM mmol/L 140 143 144   < > 146*  146*   POTASSIUM mmol/L 4.7 4.0  --   --  4.1   CHLORIDE mmol/L 109* 111*  --   --  114*   CO2 mmol/L 19* 21  --   --  21   BUN mg/dL 52* 56*  --    --  59*   CREATININE mg/dL 2.93* 3.13*  --   --  3.14*   GLUCOSE mg/dL 89 99  --   --  92   CALCIUM mg/dL 7.3* 7.5*  --   --  7.5*   ANION GAP mmol/L 17 15  --   --  15   EGFR mL/min/1.73m*2 15* 14*  --   --  14*   PHOSPHORUS mg/dL 4.6 4.4  --   --  4.7    < > = values in this interval not displayed.     Results from last 72 hours   Lab Units 02/07/25  0747 02/06/25  0634 02/05/25  0705   ALBUMIN g/dL 2.6* 2.7* 2.9*     Estimated Creatinine Clearance: 11 mL/min (A) (by C-G formula based on SCr of 2.93 mg/dL (H)).  C-Reactive Protein   Date Value Ref Range Status   02/04/2025 5.67 (H) <1.00 mg/dL Final   02/03/2025 5.73 (H) <1.00 mg/dL Final   02/02/2025 4.75 (H) <1.00 mg/dL Final     Microbiology  Reviewed  Imaging  Reviewed        Assessment/Plan   Pneumonia / effusion / atelectasis, procalcitonin 0.46, MRSA screen positive  Pyuria, negative culture  Encephalopathy, likely metabolic     Recommendations :  Continue Cefepime and Vancomycin  Discussed with the medical team     I spent minutes in the professional and overall care of this patient.      Kostas Miller MD

## 2025-02-07 NOTE — CARE PLAN
The patient's goals for the shift include      The clinical goals for the shift include Pt will be free from falls    Over the shift, the patient did make progress toward the following goals. Barriers to progression include acuteness of illness. Recommendations to address these barriers include hourly rounding, administration of prescribed treatments.

## 2025-02-08 LAB
ALBUMIN SERPL BCP-MCNC: 3 G/DL (ref 3.4–5)
ANION GAP SERPL CALC-SCNC: 20 MMOL/L (ref 10–20)
BASOPHILS # BLD AUTO: 0.06 X10*3/UL (ref 0–0.1)
BASOPHILS NFR BLD AUTO: 0.6 %
BUN SERPL-MCNC: 49 MG/DL (ref 6–23)
CALCIUM SERPL-MCNC: 7.8 MG/DL (ref 8.6–10.3)
CHLORIDE SERPL-SCNC: 108 MMOL/L (ref 98–107)
CO2 SERPL-SCNC: 17 MMOL/L (ref 21–32)
CREAT SERPL-MCNC: 2.98 MG/DL (ref 0.5–1.05)
EGFRCR SERPLBLD CKD-EPI 2021: 14 ML/MIN/1.73M*2
EOSINOPHIL # BLD AUTO: 0.21 X10*3/UL (ref 0–0.4)
EOSINOPHIL NFR BLD AUTO: 2.2 %
ERYTHROCYTE [DISTWIDTH] IN BLOOD BY AUTOMATED COUNT: 14.8 % (ref 11.5–14.5)
GLUCOSE SERPL-MCNC: 91 MG/DL (ref 74–99)
HCT VFR BLD AUTO: 36.8 % (ref 36–46)
HGB BLD-MCNC: 11.9 G/DL (ref 12–16)
IMM GRANULOCYTES # BLD AUTO: 0.07 X10*3/UL (ref 0–0.5)
IMM GRANULOCYTES NFR BLD AUTO: 0.7 % (ref 0–0.9)
LYMPHOCYTES # BLD AUTO: 0.74 X10*3/UL (ref 0.8–3)
LYMPHOCYTES NFR BLD AUTO: 7.8 %
MAGNESIUM SERPL-MCNC: 2.06 MG/DL (ref 1.6–2.4)
MCH RBC QN AUTO: 29.8 PG (ref 26–34)
MCHC RBC AUTO-ENTMCNC: 32.3 G/DL (ref 32–36)
MCV RBC AUTO: 92 FL (ref 80–100)
MONOCYTES # BLD AUTO: 0.69 X10*3/UL (ref 0.05–0.8)
MONOCYTES NFR BLD AUTO: 7.3 %
NEUTROPHILS # BLD AUTO: 7.71 X10*3/UL (ref 1.6–5.5)
NEUTROPHILS NFR BLD AUTO: 81.4 %
NRBC BLD-RTO: 0 /100 WBCS (ref 0–0)
PHOSPHATE SERPL-MCNC: 4.3 MG/DL (ref 2.5–4.9)
PLATELET # BLD AUTO: 289 X10*3/UL (ref 150–450)
POTASSIUM SERPL-SCNC: 4.2 MMOL/L (ref 3.5–5.3)
RBC # BLD AUTO: 3.99 X10*6/UL (ref 4–5.2)
SODIUM SERPL-SCNC: 141 MMOL/L (ref 136–145)
VANCOMYCIN SERPL-MCNC: 19.8 UG/ML (ref 5–20)
WBC # BLD AUTO: 9.5 X10*3/UL (ref 4.4–11.3)

## 2025-02-08 PROCEDURE — 85025 COMPLETE CBC W/AUTO DIFF WBC: CPT | Performed by: INTERNAL MEDICINE

## 2025-02-08 PROCEDURE — 1100000001 HC PRIVATE ROOM DAILY

## 2025-02-08 PROCEDURE — 83735 ASSAY OF MAGNESIUM: CPT | Performed by: INTERNAL MEDICINE

## 2025-02-08 PROCEDURE — 2500000001 HC RX 250 WO HCPCS SELF ADMINISTERED DRUGS (ALT 637 FOR MEDICARE OP): Performed by: INTERNAL MEDICINE

## 2025-02-08 PROCEDURE — 36415 COLL VENOUS BLD VENIPUNCTURE: CPT | Performed by: INTERNAL MEDICINE

## 2025-02-08 PROCEDURE — 99232 SBSQ HOSP IP/OBS MODERATE 35: CPT | Performed by: INTERNAL MEDICINE

## 2025-02-08 PROCEDURE — 2500000004 HC RX 250 GENERAL PHARMACY W/ HCPCS (ALT 636 FOR OP/ED): Performed by: INTERNAL MEDICINE

## 2025-02-08 PROCEDURE — 80069 RENAL FUNCTION PANEL: CPT | Performed by: INTERNAL MEDICINE

## 2025-02-08 PROCEDURE — 2500000001 HC RX 250 WO HCPCS SELF ADMINISTERED DRUGS (ALT 637 FOR MEDICARE OP): Performed by: NURSE PRACTITIONER

## 2025-02-08 PROCEDURE — 80202 ASSAY OF VANCOMYCIN: CPT | Performed by: INTERNAL MEDICINE

## 2025-02-08 PROCEDURE — 2500000005 HC RX 250 GENERAL PHARMACY W/O HCPCS: Performed by: INTERNAL MEDICINE

## 2025-02-08 PROCEDURE — 2500000002 HC RX 250 W HCPCS SELF ADMINISTERED DRUGS (ALT 637 FOR MEDICARE OP, ALT 636 FOR OP/ED): Performed by: INTERNAL MEDICINE

## 2025-02-08 PROCEDURE — 2500000004 HC RX 250 GENERAL PHARMACY W/ HCPCS (ALT 636 FOR OP/ED): Performed by: NURSE PRACTITIONER

## 2025-02-08 RX ORDER — LORAZEPAM 2 MG/ML
1 INJECTION INTRAMUSCULAR ONCE
Status: COMPLETED | OUTPATIENT
Start: 2025-02-08 | End: 2025-02-08

## 2025-02-08 RX ORDER — LABETALOL HYDROCHLORIDE 5 MG/ML
10 INJECTION, SOLUTION INTRAVENOUS ONCE
Status: COMPLETED | OUTPATIENT
Start: 2025-02-08 | End: 2025-02-08

## 2025-02-08 RX ADMIN — CARVEDILOL 12.5 MG: 12.5 TABLET, FILM COATED ORAL at 19:51

## 2025-02-08 RX ADMIN — CARVEDILOL 12.5 MG: 12.5 TABLET, FILM COATED ORAL at 09:40

## 2025-02-08 RX ADMIN — ACETAMINOPHEN 975 MG: 325 TABLET, FILM COATED ORAL at 14:56

## 2025-02-08 RX ADMIN — SODIUM BICARBONATE 650 MG: 650 TABLET ORAL at 20:05

## 2025-02-08 RX ADMIN — OLANZAPINE 2.5 MG: 5 TABLET, FILM COATED ORAL at 02:39

## 2025-02-08 RX ADMIN — AMLODIPINE BESYLATE 5 MG: 5 TABLET ORAL at 09:42

## 2025-02-08 RX ADMIN — HEPARIN SODIUM 5000 UNITS: 5000 INJECTION INTRAVENOUS; SUBCUTANEOUS at 14:55

## 2025-02-08 RX ADMIN — HEPARIN SODIUM 5000 UNITS: 5000 INJECTION INTRAVENOUS; SUBCUTANEOUS at 23:40

## 2025-02-08 RX ADMIN — BENZONATATE 200 MG: 100 CAPSULE ORAL at 14:55

## 2025-02-08 RX ADMIN — CEFEPIME HYDROCHLORIDE 1 G: 1 INJECTION, SOLUTION INTRAVENOUS at 16:47

## 2025-02-08 RX ADMIN — LIDOCAINE 4% 1 PATCH: 40 PATCH TOPICAL at 14:52

## 2025-02-08 RX ADMIN — LORAZEPAM 1 MG: 2 INJECTION INTRAMUSCULAR; INTRAVENOUS at 23:40

## 2025-02-08 RX ADMIN — PRAVASTATIN SODIUM 20 MG: 40 TABLET ORAL at 20:05

## 2025-02-08 RX ADMIN — LABETALOL HYDROCHLORIDE 10 MG: 5 INJECTION, SOLUTION INTRAVENOUS at 05:15

## 2025-02-08 RX ADMIN — CETIRIZINE HYDROCHLORIDE 5 MG: 10 TABLET, FILM COATED ORAL at 16:57

## 2025-02-08 RX ADMIN — OLANZAPINE 2.5 MG: 5 TABLET, FILM COATED ORAL at 18:17

## 2025-02-08 RX ADMIN — BENZONATATE 200 MG: 100 CAPSULE ORAL at 20:05

## 2025-02-08 RX ADMIN — GABAPENTIN 100 MG: 100 CAPSULE ORAL at 19:51

## 2025-02-08 RX ADMIN — OLANZAPINE 2.5 MG: 5 TABLET, FILM COATED ORAL at 09:41

## 2025-02-08 ASSESSMENT — COGNITIVE AND FUNCTIONAL STATUS - GENERAL
TOILETING: A LOT
DAILY ACTIVITIY SCORE: 13
MOBILITY SCORE: 12
STANDING UP FROM CHAIR USING ARMS: A LOT
MOVING FROM LYING ON BACK TO SITTING ON SIDE OF FLAT BED WITH BEDRAILS: A LITTLE
WALKING IN HOSPITAL ROOM: A LOT
CLIMB 3 TO 5 STEPS WITH RAILING: TOTAL
TOILETING: A LOT
PERSONAL GROOMING: A LOT
DAILY ACTIVITIY SCORE: 13
MOVING TO AND FROM BED TO CHAIR: A LOT
DRESSING REGULAR LOWER BODY CLOTHING: A LOT
DRESSING REGULAR UPPER BODY CLOTHING: A LOT
DRESSING REGULAR LOWER BODY CLOTHING: A LOT
EATING MEALS: A LITTLE
PERSONAL GROOMING: A LOT
HELP NEEDED FOR BATHING: A LOT
HELP NEEDED FOR BATHING: A LOT
MOVING TO AND FROM BED TO CHAIR: A LOT
CLIMB 3 TO 5 STEPS WITH RAILING: TOTAL
WALKING IN HOSPITAL ROOM: TOTAL
MOBILITY SCORE: 11
DRESSING REGULAR UPPER BODY CLOTHING: A LOT
EATING MEALS: A LITTLE
TURNING FROM BACK TO SIDE WHILE IN FLAT BAD: A LOT
TURNING FROM BACK TO SIDE WHILE IN FLAT BAD: A LOT
MOVING FROM LYING ON BACK TO SITTING ON SIDE OF FLAT BED WITH BEDRAILS: A LITTLE
STANDING UP FROM CHAIR USING ARMS: A LOT

## 2025-02-08 ASSESSMENT — PAIN SCALES - GENERAL
PAINLEVEL_OUTOF10: 0 - NO PAIN
PAINLEVEL_OUTOF10: 0 - NO PAIN

## 2025-02-08 ASSESSMENT — PAIN - FUNCTIONAL ASSESSMENT: PAIN_FUNCTIONAL_ASSESSMENT: 0-10

## 2025-02-08 NOTE — PROGRESS NOTES
"  Subjective    Patient has some confusion but denies chest pain, shortness breath, nausea, vomiting or diarrhea.    Objective    Vitals  Visit Vitals  /86 (BP Location: Right arm)   Pulse 83   Temp 36.6 °C (97.9 °F) (Temporal)   Resp 18   Ht 1.575 m (5' 2.01\")   Wt 60.8 kg (134 lb 0.6 oz)   SpO2 96%   BMI 24.51 kg/m²   OB Status Postmenopausal   Smoking Status Never   BSA 1.63 m²       Physical Exam   General: Alert and oriented to self, bday, year but not to the president. No acute distress.   HEENT: Sclera clear.   CVS: RRR.   Lungs: CTAB.  Abdomen: Soft.  Nontender.  Bowel sounds present.        Extremities: No significant pitting edema bilat ankles.    Psychiatric: Cooperative.     IOs    Intake/Output Summary (Last 24 hours) at 2/8/2025 1722  Last data filed at 2/8/2025 1236  Gross per 24 hour   Intake 530 ml   Output 500 ml   Net 30 ml       Labs:   Results from last 72 hours   Lab Units 02/08/25  0613 02/07/25  0747 02/06/25  0634   SODIUM mmol/L 141 140 143   POTASSIUM mmol/L 4.2 4.7 4.0   CHLORIDE mmol/L 108* 109* 111*   CO2 mmol/L 17* 19* 21   BUN mg/dL 49* 52* 56*   CREATININE mg/dL 2.98* 2.93* 3.13*   GLUCOSE mg/dL 91 89 99   CALCIUM mg/dL 7.8* 7.3* 7.5*   ANION GAP mmol/L 20 17 15   EGFR mL/min/1.73m*2 14* 15* 14*   PHOSPHORUS mg/dL 4.3 4.6 4.4      Results from last 72 hours   Lab Units 02/08/25  0613 02/07/25  0747 02/06/25  0634   WBC AUTO x10*3/uL 9.5 6.8 9.9   HEMOGLOBIN g/dL 11.9* 10.3* 10.8*   HEMATOCRIT % 36.8 34.7* 35.2*   PLATELETS AUTO x10*3/uL 289 216 241   NEUTROS PCT AUTO % 81.4 74.4 80.5   LYMPHS PCT AUTO % 7.8 10.6 7.6   MONOS PCT AUTO % 7.3 10.0 8.0   EOS PCT AUTO % 2.2 3.5 2.8      Lab Results   Component Value Date    CALCIUM 7.8 (L) 02/08/2025    PHOS 4.3 02/08/2025      Lab Results   Component Value Date    CRP 5.67 (H) 02/04/2025      [unfilled]       Images  CT head wo IV contrast  Narrative: Interpreted By:  Bianca Lomas,   STUDY:  CT HEAD WO IV CONTRAST; ;  2/7/2025 " 2:33 pm      INDICATION:  Signs/Symptoms:confusion.      COMPARISON:  02/02/2025      ACCESSION NUMBER(S):  [id number]      ORDERING CLINICIAN:  JESU FANG      TECHNIQUE:  Serial axial images of the head were obtained without intravenous  contrast. Sagittal and coronal reconstructions were generated.      FINDINGS:  The ventricles are midline and enlarged. There is prominence of the  cortical sulci and superior cerebellar cisterns.      There are no acute parenchymal abnormalities.      There is no hemorrhage or extra-axial fluid.      There is extensive paranasal sinus disease. The mastoid air cells are  unremarkable.      There is no obvious skull fracture or scalp hematoma.      COMPARISON OF FINDINGS:  The brain is similar.      Impression: Generalized cerebral and cerebellar atrophy. No acute findings.          MACRO:  none      Signed by: Bianca Lomas 2/7/2025 3:01 PM  Dictation workstation:   [id number]      Meds  Scheduled medications  acetaminophen, 975 mg, oral, q8h  amLODIPine, 5 mg, oral, Daily  aspirin, 81 mg, oral, Daily  B complex-vitamin C, 1 tablet, oral, Daily  benzonatate, 200 mg, oral, TID  carvedilol, 12.5 mg, oral, BID  cefepime, 1 g, intravenous, q24h  cetirizine, 5 mg, oral, Daily  [Held by provider] ferrous sulfate (325 mg ferrous sulfate), 65 mg of iron, oral, Daily  gabapentin, 100 mg, oral, Nightly  heparin (porcine), 5,000 Units, subcutaneous, q8h LATA  lidocaine, 1 patch, transdermal, Daily  [Held by provider] polyethylene glycol, 17 g, oral, BID  pravastatin, 20 mg, oral, Daily  [Held by provider] sennosides-docusate sodium, 2 tablet, oral, BID  sodium bicarbonate, 650 mg, oral, BID  [Held by provider] torsemide, 10 mg, oral, Daily      Continuous medications     PRN medications  PRN medications: dextromethorphan-guaifenesin, ipratropium-albuteroL, OLANZapine **OR** OLANZapine, ondansetron, prochlorperazine, vancomycin     Assessment and Plan    Camelia Jones is a 89 y.o. female  from ECF with PMH HTN, HLD, chronic anemia, CKD IV, chronic LE edema, pancreatitis, RLE DVT with post thrombotic syndrome, hx sacral ala / L5 fx, chronic hyponatremia, admitted with acute metabolic encephalopathy with azotemia with suspected uremic syndrome; WARREN/CKD stage IV; hypernatremia; bilateral pleural effusion; recent pneumonia.      Acute metabolic encephalopathy  -Likely secondary to dehydration with hypernatremia, acute kidney injury and pneumonia.  -Continue to treat underlying hyponatremia, WARREN and pneumonia.  -Repeat CT scan of the head on 2/7/25 with generalized cerebral and cerebellar atrophy; no acute findings.  -Repeated the CT scan to see if by chance there were any strokes present that were not seen on initial CT scan.  -Patient's confusion may be due to patient having some underlying dementia and due to her acute events and being on a foreign environment that she may have more issues with altered mental status.  -Started on Zyprexa 2.5 mg every 6 hours as needed and reviewed EKG from 2/2/2025 with QTc interval of 471 MS and repeat EKG on 2/7/2025 with QTc interval 479 MS.  -Monitor.     WARREN with CKD stage IV  -On oral bicarb tabs.  -Nephrology consulted and feels that WARREN likely secondary to volume depletion/dehydration.    -We changed IV fluids to D5W on 2/4, but due to drop in sodium, we discontinued IV fluids on 2/4.    -Restarted IV fluids with D5W at 50 cc/h on 2/5 and stopped ivf's on 2/6.   -Creatinine at 2.98 today; 2.93 on 2/7; 3.13 on 2/6; 3.14 on 2/5; 3.27 on 2/4.  -Renal function is around recent baseline.     Hypernatremia  -Resolved.  -Nephrology consulted and feels that hypernatremia likely secondary to volume depletion/dehydration.  Nephrology initially recommended to change IV fluids to D5W at 100 cc/h and recheck sodium every 8 hours on 2/4.  -As sodium had dropped to 139 from 149 in the same day, we stopped D5W.  Sodium trended up to 146 just after midnight on 2/5.  So, not  sure if lab drawn close to where IV fluids were being administered, but we did stop IV fluids on 2/4.    -Restarted IV fluids with D5W at 50 cc/h on 2/5 and subsequently discontinued IV fluids on 2/6.  -Sodium at 141 today; 140 on 2/7; 143 on 2/6.  -Nephrology recommends for patient to drink at least 45 ounces of water intake daily at the nursing home.  -Monitor.     Bilateral pleural effusion with pneumonia  -Patient had been on Invanz prior to admission and during this admission and also started on vancomycin during this admission.  -ID consulted and recommends to continue cefepime and continue IV vancomycin.  -Continue aspiration precautions.  -Monitor.     Hypertension  -Holding home med torsemide.  -Continue Coreg.   -Nephrology is okay to restart amlodipine if needed but recommends to continue to hold torsemide.  -As BP was elevated overnight, restarting Norvasc 5 mg daily today, 2/8.  It sounds as if patient had some agitation this morning as well, which may have increased her blood pressure also.  Repeat blood pressure this afternoon has improved.  Monitor.     Hyperlipidemia  -Continue pravastatin.     Chronic anemia  -Hemoglobin appears to be stable.    -Monitor.     Right lower extremity DVT with post thrombotic syndrome  -Monitor.     DVT prophylaxis  -Heparin subcu.

## 2025-02-08 NOTE — CARE PLAN
Problem: Pain - Adult  Goal: Verbalizes/displays adequate comfort level or baseline comfort level  Outcome: Progressing     Problem: Safety - Adult  Goal: Free from fall injury  Outcome: Progressing     Problem: Discharge Planning  Goal: Discharge to home or other facility with appropriate resources  Outcome: Progressing     Problem: Chronic Conditions and Co-morbidities  Goal: Patient's chronic conditions and co-morbidity symptoms are monitored and maintained or improved  Outcome: Progressing     Problem: Nutrition  Goal: Nutrient intake appropriate for maintaining nutritional needs  Outcome: Progressing   The patient's goals for the shift include      The clinical goals for the shift include patient will have one meal in the chair today    Patient was confused and was unable to sit up in chair safely today.

## 2025-02-08 NOTE — PROGRESS NOTES
Camelia Jones is a 89 y.o. female on day 6 of admission presenting with Hypernatremia.    Subjective   Interval History: no fever, no new complaints        Review of Systems    Objective   Range of Vitals (last 24 hours)  Heart Rate:  [77-89]   Temp:  [36.6 °C (97.9 °F)-36.9 °C (98.4 °F)]   Resp:  [16-18]   BP: (132-180)/()   Weight:  [60.8 kg (134 lb 0.6 oz)]   SpO2:  [95 %-97 %]   Daily Weight  02/08/25 : 60.8 kg (134 lb 0.6 oz)    Body mass index is 24.51 kg/m².    Physical Exam  Constitutional:       Appearance: Normal appearance.   HENT:      Head: Normocephalic and atraumatic.      Mouth/Throat:      Mouth: Mucous membranes are moist.      Pharynx: Oropharynx is clear.   Eyes:      Pupils: Pupils are equal, round, and reactive to light.   Cardiovascular:      Rate and Rhythm: Normal rate and regular rhythm.      Heart sounds: Normal heart sounds.   Pulmonary:      Effort: Pulmonary effort is normal.      Breath sounds: Normal breath sounds.   Abdominal:      General: Abdomen is flat. Bowel sounds are normal.      Palpations: Abdomen is soft.   Musculoskeletal:      Cervical back: Normal range of motion.   Neurological:      Mental Status: She is alert.         Antibiotics  cefepime - 1 gram/50 mL  ertapenem - 1 gram  vancomycin    Relevant Results  Labs  Results from last 72 hours   Lab Units 02/08/25  0613 02/07/25  0747 02/06/25  0634   WBC AUTO x10*3/uL 9.5 6.8 9.9   HEMOGLOBIN g/dL 11.9* 10.3* 10.8*   HEMATOCRIT % 36.8 34.7* 35.2*   PLATELETS AUTO x10*3/uL 289 216 241   NEUTROS PCT AUTO % 81.4 74.4 80.5   LYMPHS PCT AUTO % 7.8 10.6 7.6   MONOS PCT AUTO % 7.3 10.0 8.0   EOS PCT AUTO % 2.2 3.5 2.8     Results from last 72 hours   Lab Units 02/08/25  0613 02/07/25  0747 02/06/25  0634   SODIUM mmol/L 141 140 143   POTASSIUM mmol/L 4.2 4.7 4.0   CHLORIDE mmol/L 108* 109* 111*   CO2 mmol/L 17* 19* 21   BUN mg/dL 49* 52* 56*   CREATININE mg/dL 2.98* 2.93* 3.13*   GLUCOSE mg/dL 91 89 99   CALCIUM mg/dL  7.8* 7.3* 7.5*   ANION GAP mmol/L 20 17 15   EGFR mL/min/1.73m*2 14* 15* 14*   PHOSPHORUS mg/dL 4.3 4.6 4.4     Results from last 72 hours   Lab Units 02/08/25  0613 02/07/25  0747 02/06/25  0634   ALBUMIN g/dL 3.0* 2.6* 2.7*     Estimated Creatinine Clearance: 10.8 mL/min (A) (by C-G formula based on SCr of 2.98 mg/dL (H)).  C-Reactive Protein   Date Value Ref Range Status   02/04/2025 5.67 (H) <1.00 mg/dL Final   02/03/2025 5.73 (H) <1.00 mg/dL Final   02/02/2025 4.75 (H) <1.00 mg/dL Final     Microbiology  Reviewed  Imaging  Reviewed        Assessment/Plan   Pneumonia / effusion / atelectasis, procalcitonin 0.46, MRSA screen positive  Pyuria, negative culture  Encephalopathy, likely metabolic     Recommendations :  Continue Cefepime and Vancomycin, plan to stop the antibiotics with discharge  Discussed with the medical team     I spent minutes in the professional and overall care of this patient.      Kostas Miller MD

## 2025-02-08 NOTE — PROGRESS NOTES
Vancomycin Dosing by Pharmacy- FOLLOW UP    Camelia Jones is a 89 y.o. year old female who Pharmacy has been consulted for vancomycin dosing for pneumonia. Based on the patient's indication and renal status this patient is being dosed based on a goal trough/random level of 15-20.     Renal function is currently declining.    Current vancomycin dose: dose by level    Most recent random level: 19.8 mcg/mL    Visit Vitals  BP (!) 180/114 (BP Location: Right arm, Patient Position: Lying) Comment: reported to drs-waiting for orders as needed   Pulse 89   Temp 36.6 °C (97.9 °F) (Temporal)   Resp 16        Lab Results   Component Value Date    CREATININE 2.98 (H) 2025    CREATININE 2.93 (H) 2025    CREATININE 3.13 (H) 2025    CREATININE 3.14 (H) 2025        Patient weight is as follows:   Vitals:    25 0503   Weight: 60.8 kg (134 lb 0.6 oz)       Cultures:  No results found for the encounter in last 14 days.       I/O last 3 completed shifts:  In: 1500 (24.5 mL/kg) [P.O.:1250; IV Piggyback:250]  Out: 350 (5.7 mL/kg) [Urine:350 (0.2 mL/kg/hr)]  Weight: 61.3 kg   I/O during current shift:  I/O this shift:  In: 60 [P.O.:60]  Out: 500 [Urine:500]    Temp (24hrs), Av.7 °C (98.1 °F), Min:36.6 °C (97.9 °F), Max:36.9 °C (98.4 °F)      Assessment/Plan    Within goal random/trough level  Will hold dose for another day considering level at higher end of goal.  The next level will be obtained on  at 5a. May be obtained sooner if clinically indicated.   Will continue to monitor renal function daily while on vancomycin and order serum creatinine at least every 48 hours if not already ordered.  Follow for continued vancomycin needs, clinical response, and signs/symptoms of toxicity.       Cameron Fallon, PharmD

## 2025-02-09 VITALS
SYSTOLIC BLOOD PRESSURE: 136 MMHG | DIASTOLIC BLOOD PRESSURE: 71 MMHG | RESPIRATION RATE: 18 BRPM | WEIGHT: 124.78 LBS | HEIGHT: 62 IN | HEART RATE: 80 BPM | BODY MASS INDEX: 22.96 KG/M2 | TEMPERATURE: 99.3 F | OXYGEN SATURATION: 97 %

## 2025-02-09 LAB
ALBUMIN SERPL BCP-MCNC: 3 G/DL (ref 3.4–5)
ANION GAP SERPL CALC-SCNC: 19 MMOL/L (ref 10–20)
BASOPHILS # BLD AUTO: 0.05 X10*3/UL (ref 0–0.1)
BASOPHILS NFR BLD AUTO: 0.6 %
BUN SERPL-MCNC: 51 MG/DL (ref 6–23)
CALCIUM SERPL-MCNC: 8.1 MG/DL (ref 8.6–10.3)
CHLORIDE SERPL-SCNC: 111 MMOL/L (ref 98–107)
CO2 SERPL-SCNC: 17 MMOL/L (ref 21–32)
CREAT SERPL-MCNC: 3.03 MG/DL (ref 0.5–1.05)
EGFRCR SERPLBLD CKD-EPI 2021: 14 ML/MIN/1.73M*2
EOSINOPHIL # BLD AUTO: 0.23 X10*3/UL (ref 0–0.4)
EOSINOPHIL NFR BLD AUTO: 2.7 %
ERYTHROCYTE [DISTWIDTH] IN BLOOD BY AUTOMATED COUNT: 14.8 % (ref 11.5–14.5)
GLUCOSE SERPL-MCNC: 92 MG/DL (ref 74–99)
HCT VFR BLD AUTO: 37.3 % (ref 36–46)
HGB BLD-MCNC: 12 G/DL (ref 12–16)
IMM GRANULOCYTES # BLD AUTO: 0.06 X10*3/UL (ref 0–0.5)
IMM GRANULOCYTES NFR BLD AUTO: 0.7 % (ref 0–0.9)
LYMPHOCYTES # BLD AUTO: 0.72 X10*3/UL (ref 0.8–3)
LYMPHOCYTES NFR BLD AUTO: 8.6 %
MAGNESIUM SERPL-MCNC: 2.04 MG/DL (ref 1.6–2.4)
MCH RBC QN AUTO: 29.1 PG (ref 26–34)
MCHC RBC AUTO-ENTMCNC: 32.2 G/DL (ref 32–36)
MCV RBC AUTO: 91 FL (ref 80–100)
MONOCYTES # BLD AUTO: 0.89 X10*3/UL (ref 0.05–0.8)
MONOCYTES NFR BLD AUTO: 10.6 %
NEUTROPHILS # BLD AUTO: 6.44 X10*3/UL (ref 1.6–5.5)
NEUTROPHILS NFR BLD AUTO: 76.8 %
NRBC BLD-RTO: 0 /100 WBCS (ref 0–0)
PHOSPHATE SERPL-MCNC: 4.3 MG/DL (ref 2.5–4.9)
PLATELET # BLD AUTO: 288 X10*3/UL (ref 150–450)
POTASSIUM SERPL-SCNC: 4.4 MMOL/L (ref 3.5–5.3)
RBC # BLD AUTO: 4.12 X10*6/UL (ref 4–5.2)
SODIUM SERPL-SCNC: 143 MMOL/L (ref 136–145)
VANCOMYCIN SERPL-MCNC: 17.1 UG/ML (ref 5–20)
WBC # BLD AUTO: 8.4 X10*3/UL (ref 4.4–11.3)

## 2025-02-09 PROCEDURE — 85025 COMPLETE CBC W/AUTO DIFF WBC: CPT | Performed by: INTERNAL MEDICINE

## 2025-02-09 PROCEDURE — 1100000001 HC PRIVATE ROOM DAILY

## 2025-02-09 PROCEDURE — 2500000001 HC RX 250 WO HCPCS SELF ADMINISTERED DRUGS (ALT 637 FOR MEDICARE OP): Performed by: INTERNAL MEDICINE

## 2025-02-09 PROCEDURE — 99232 SBSQ HOSP IP/OBS MODERATE 35: CPT | Performed by: INTERNAL MEDICINE

## 2025-02-09 PROCEDURE — 80069 RENAL FUNCTION PANEL: CPT | Performed by: INTERNAL MEDICINE

## 2025-02-09 PROCEDURE — 2500000005 HC RX 250 GENERAL PHARMACY W/O HCPCS: Performed by: INTERNAL MEDICINE

## 2025-02-09 PROCEDURE — 80202 ASSAY OF VANCOMYCIN: CPT | Performed by: INTERNAL MEDICINE

## 2025-02-09 PROCEDURE — 2500000004 HC RX 250 GENERAL PHARMACY W/ HCPCS (ALT 636 FOR OP/ED): Performed by: INTERNAL MEDICINE

## 2025-02-09 PROCEDURE — 94760 N-INVAS EAR/PLS OXIMETRY 1: CPT

## 2025-02-09 PROCEDURE — 2500000001 HC RX 250 WO HCPCS SELF ADMINISTERED DRUGS (ALT 637 FOR MEDICARE OP): Performed by: NURSE PRACTITIONER

## 2025-02-09 PROCEDURE — 83735 ASSAY OF MAGNESIUM: CPT | Performed by: INTERNAL MEDICINE

## 2025-02-09 PROCEDURE — 36415 COLL VENOUS BLD VENIPUNCTURE: CPT | Performed by: INTERNAL MEDICINE

## 2025-02-09 RX ORDER — VANCOMYCIN HYDROCHLORIDE 500 MG/100ML
500 INJECTION, SOLUTION INTRAVENOUS ONCE
Status: COMPLETED | OUTPATIENT
Start: 2025-02-09 | End: 2025-02-09

## 2025-02-09 RX ADMIN — VANCOMYCIN HYDROCHLORIDE 500 MG: 500 INJECTION, SOLUTION INTRAVENOUS at 14:49

## 2025-02-09 RX ADMIN — CEFEPIME HYDROCHLORIDE 1 G: 1 INJECTION, SOLUTION INTRAVENOUS at 16:12

## 2025-02-09 RX ADMIN — ACETAMINOPHEN 975 MG: 325 TABLET, FILM COATED ORAL at 06:04

## 2025-02-09 RX ADMIN — GABAPENTIN 100 MG: 100 CAPSULE ORAL at 20:05

## 2025-02-09 RX ADMIN — SODIUM BICARBONATE 650 MG: 650 TABLET ORAL at 11:48

## 2025-02-09 RX ADMIN — CETIRIZINE HYDROCHLORIDE 5 MG: 10 TABLET, FILM COATED ORAL at 11:49

## 2025-02-09 RX ADMIN — HEPARIN SODIUM 5000 UNITS: 5000 INJECTION INTRAVENOUS; SUBCUTANEOUS at 11:47

## 2025-02-09 RX ADMIN — SODIUM BICARBONATE 650 MG: 650 TABLET ORAL at 20:06

## 2025-02-09 RX ADMIN — AMLODIPINE BESYLATE 5 MG: 5 TABLET ORAL at 06:04

## 2025-02-09 RX ADMIN — BENZONATATE 200 MG: 100 CAPSULE ORAL at 20:06

## 2025-02-09 RX ADMIN — HEPARIN SODIUM 5000 UNITS: 5000 INJECTION INTRAVENOUS; SUBCUTANEOUS at 20:06

## 2025-02-09 RX ADMIN — Medication 1 TABLET: at 11:48

## 2025-02-09 RX ADMIN — CARVEDILOL 12.5 MG: 12.5 TABLET, FILM COATED ORAL at 20:06

## 2025-02-09 RX ADMIN — PRAVASTATIN SODIUM 20 MG: 40 TABLET ORAL at 20:06

## 2025-02-09 RX ADMIN — CARVEDILOL 12.5 MG: 12.5 TABLET, FILM COATED ORAL at 11:48

## 2025-02-09 RX ADMIN — ACETAMINOPHEN 975 MG: 325 TABLET, FILM COATED ORAL at 20:05

## 2025-02-09 RX ADMIN — LIDOCAINE 4% 1 PATCH: 40 PATCH TOPICAL at 11:54

## 2025-02-09 RX ADMIN — BENZONATATE 200 MG: 100 CAPSULE ORAL at 11:47

## 2025-02-09 RX ADMIN — ACETAMINOPHEN 975 MG: 325 TABLET, FILM COATED ORAL at 12:51

## 2025-02-09 RX ADMIN — ASPIRIN 81 MG: 81 TABLET, COATED ORAL at 11:48

## 2025-02-09 ASSESSMENT — COGNITIVE AND FUNCTIONAL STATUS - GENERAL
STANDING UP FROM CHAIR USING ARMS: A LOT
DRESSING REGULAR LOWER BODY CLOTHING: A LOT
CLIMB 3 TO 5 STEPS WITH RAILING: TOTAL
HELP NEEDED FOR BATHING: A LOT
TOILETING: A LOT
DRESSING REGULAR UPPER BODY CLOTHING: A LOT
TURNING FROM BACK TO SIDE WHILE IN FLAT BAD: A LOT
WALKING IN HOSPITAL ROOM: TOTAL
TURNING FROM BACK TO SIDE WHILE IN FLAT BAD: A LOT
MOBILITY SCORE: 11
CLIMB 3 TO 5 STEPS WITH RAILING: TOTAL
WALKING IN HOSPITAL ROOM: TOTAL
HELP NEEDED FOR BATHING: A LOT
STANDING UP FROM CHAIR USING ARMS: A LOT
EATING MEALS: A LITTLE
DAILY ACTIVITIY SCORE: 13
MOVING FROM LYING ON BACK TO SITTING ON SIDE OF FLAT BED WITH BEDRAILS: A LITTLE
MOBILITY SCORE: 11
EATING MEALS: A LITTLE
MOVING FROM LYING ON BACK TO SITTING ON SIDE OF FLAT BED WITH BEDRAILS: A LITTLE
PERSONAL GROOMING: A LOT
TOILETING: A LOT
MOVING TO AND FROM BED TO CHAIR: A LOT
PERSONAL GROOMING: A LOT
MOVING TO AND FROM BED TO CHAIR: A LOT
DAILY ACTIVITIY SCORE: 13
DRESSING REGULAR UPPER BODY CLOTHING: A LOT
DRESSING REGULAR LOWER BODY CLOTHING: A LOT

## 2025-02-09 ASSESSMENT — PAIN SCALES - GENERAL
PAINLEVEL_OUTOF10: 0 - NO PAIN
PAINLEVEL_OUTOF10: 0 - NO PAIN

## 2025-02-09 NOTE — PROGRESS NOTES
"Vancomycin Dosing by Pharmacy- FOLLOW UP    Camelia Jones 89YF Ht 5'2\" Wt 124# (57kg)   Hospital Day #7/Vancomycin Day #7  Pharmacy has been consulted for vancomycin dosing for pneumonia.  Pt's MRSA screen +  Based on the patient's indication and renal status this patient is being dosed based on a goal trough/random level of 15-20.     Renal function is currently declining.  Clcr 10 mL/min    Current vancomycin dose: dose by level    Most recent random level: 17.1 mcg/mL today w/ am labs    Visit Vitals  /76 (BP Location: Right arm)   Pulse 77   Temp 36.6 °C (97.9 °F) (Temporal)   Resp 18        Lab Results   Component Value Date    CREATININE 3.03 (H) 02/09/2025    CREATININE 2.98 (H) 02/08/2025    CREATININE 2.93 (H) 02/07/2025    CREATININE 3.13 (H) 02/06/2025        Patient weight is as follows:   Vitals:    02/09/25 0432   Weight: 56.6 kg (124 lb 12.5 oz)       ABX include cefepime 1g IV q24h         Assessment/Plan    Within goal random/trough level of 17.1. Will redose w/ 500mg today at 1400 x1 dose. Anticipate q48h dosing  The next level will be obtained on 2/11 w/ am labs. May be obtained sooner if indicated.   Will continue to monitor renal function daily while on vancomycin and order serum creatinine at least every 48 hours if not already ordered.  Follow for continued vancomycin needs, clinical response, and signs/symptoms of toxicity.       Manjinder Ayoub, PharmD           "

## 2025-02-09 NOTE — CARE PLAN
The patient's goals for the shift include      The clinical goals for the shift include patient to be remain safe this shift    Over the shift, the patient did not make progress toward the following goals. Barriers to progression include cognition. Recommendations to address these barriers include frequent rounding by staff.

## 2025-02-09 NOTE — PROGRESS NOTES
"  Subjective    Patient is sleeping this morning.  I spoke with patient's daughter at the bedside, who states patient had some hallucinations yesterday and some confusion but at other times was pretty sharp with her memory.    Objective    Vitals  Visit Vitals  /76 (BP Location: Right arm)   Pulse 79   Temp 37 °C (98.6 °F) (Temporal)   Resp 18   Ht 1.575 m (5' 2.01\")   Wt 56.6 kg (124 lb 12.5 oz)   SpO2 94%   BMI 22.82 kg/m²   OB Status Postmenopausal   Smoking Status Never   BSA 1.57 m²       Physical Exam   General: Sleeping.  No acute distress.    CVS: RRR.   Lungs: Anteriorly auscultated and CTAB.  Abdomen: Soft.  No obvious tenderness.  Bowel sounds present.          Extremities: No significant pitting edema bilateral ankles.            IOs    Intake/Output Summary (Last 24 hours) at 2/9/2025 1721  Last data filed at 2/9/2025 1600  Gross per 24 hour   Intake 150 ml   Output 50 ml   Net 100 ml       Labs:   Results from last 72 hours   Lab Units 02/09/25  0656 02/08/25  0613 02/07/25  0747   SODIUM mmol/L 143 141 140   POTASSIUM mmol/L 4.4 4.2 4.7   CHLORIDE mmol/L 111* 108* 109*   CO2 mmol/L 17* 17* 19*   BUN mg/dL 51* 49* 52*   CREATININE mg/dL 3.03* 2.98* 2.93*   GLUCOSE mg/dL 92 91 89   CALCIUM mg/dL 8.1* 7.8* 7.3*   ANION GAP mmol/L 19 20 17   EGFR mL/min/1.73m*2 14* 14* 15*   PHOSPHORUS mg/dL 4.3 4.3 4.6      Results from last 72 hours   Lab Units 02/09/25  0656 02/08/25  0613 02/07/25  0747   WBC AUTO x10*3/uL 8.4 9.5 6.8   HEMOGLOBIN g/dL 12.0 11.9* 10.3*   HEMATOCRIT % 37.3 36.8 34.7*   PLATELETS AUTO x10*3/uL 288 289 216   NEUTROS PCT AUTO % 76.8 81.4 74.4   LYMPHS PCT AUTO % 8.6 7.8 10.6   MONOS PCT AUTO % 10.6 7.3 10.0   EOS PCT AUTO % 2.7 2.2 3.5      Lab Results   Component Value Date    CALCIUM 8.1 (L) 02/09/2025    PHOS 4.3 02/09/2025      Lab Results   Component Value Date    CRP 5.67 (H) 02/04/2025      [unfilled]       Images  CT head wo IV contrast  Narrative: Interpreted By:  " Bianca Lomas,   STUDY:  CT HEAD WO IV CONTRAST; ;  2/7/2025 2:33 pm      INDICATION:  Signs/Symptoms:confusion.      COMPARISON:  02/02/2025      ACCESSION NUMBER(S):  [id number]      ORDERING CLINICIAN:  JESU FANG      TECHNIQUE:  Serial axial images of the head were obtained without intravenous  contrast. Sagittal and coronal reconstructions were generated.      FINDINGS:  The ventricles are midline and enlarged. There is prominence of the  cortical sulci and superior cerebellar cisterns.      There are no acute parenchymal abnormalities.      There is no hemorrhage or extra-axial fluid.      There is extensive paranasal sinus disease. The mastoid air cells are  unremarkable.      There is no obvious skull fracture or scalp hematoma.      COMPARISON OF FINDINGS:  The brain is similar.      Impression: Generalized cerebral and cerebellar atrophy. No acute findings.          MACRO:  none      Signed by: Binaca Lomas 2/7/2025 3:01 PM  Dictation workstation:   [id number]      Meds  Scheduled medications  acetaminophen, 975 mg, oral, q8h  amLODIPine, 5 mg, oral, Daily  aspirin, 81 mg, oral, Daily  B complex-vitamin C, 1 tablet, oral, Daily  benzonatate, 200 mg, oral, TID  carvedilol, 12.5 mg, oral, BID  cefepime, 1 g, intravenous, q24h  cetirizine, 5 mg, oral, Daily  [Held by provider] ferrous sulfate (325 mg ferrous sulfate), 65 mg of iron, oral, Daily  gabapentin, 100 mg, oral, Nightly  heparin (porcine), 5,000 Units, subcutaneous, q8h LATA  lidocaine, 1 patch, transdermal, Daily  [Held by provider] polyethylene glycol, 17 g, oral, BID  pravastatin, 20 mg, oral, Daily  [Held by provider] sennosides-docusate sodium, 2 tablet, oral, BID  sodium bicarbonate, 650 mg, oral, BID  [Held by provider] torsemide, 10 mg, oral, Daily      Continuous medications     PRN medications  PRN medications: dextromethorphan-guaifenesin, ipratropium-albuteroL, OLANZapine **OR** OLANZapine, ondansetron, prochlorperazine, vancomycin      Assessment and Plan    Camelia Jones is a 89 y.o. female from ECF with PMH HTN, HLD, chronic anemia, CKD IV, chronic LE edema, pancreatitis, RLE DVT with post thrombotic syndrome, hx sacral ala / L5 fx, chronic hyponatremia, admitted with acute metabolic encephalopathy with azotemia with suspected uremic syndrome; WARREN/CKD stage IV; hypernatremia; bilateral pleural effusion; recent pneumonia.      Acute metabolic encephalopathy  -Likely secondary to dehydration with hypernatremia, acute kidney injury and pneumonia.  -Continue to treat underlying hyponatremia, WARREN and pneumonia.  -Repeat CT scan of the head on 2/7/25 with generalized cerebral and cerebellar atrophy; no acute findings.  -Repeated the CT scan to see if by chance there were any strokes present that were not seen on initial CT scan.  -Patient's confusion may be due to patient having some underlying dementia and due to her acute events and being on a foreign environment that she may have more issues with altered mental status.  -Started on Zyprexa 2.5 mg every 6 hours as needed and reviewed EKG from 2/2/2025 with QTc interval of 471 MS and repeat EKG on 2/7/2025 with QTc interval 479 MS.  -Today, 4/9, patient is very sleepy.  She did receive Ativan at 2340 on 2/8 and also received olanzapine at 1817 on 2/8.  Not sure if patient did not sleep well and also after receiving those meds if this is causing patient to sleep more today.  I discussed this with patient's daughter and discussed the plan of care with the daughter as well and all of her questions were answered.  -Monitor.     WARREN with CKD stage IV  -On oral bicarb tabs.  -Nephrology consulted and feels that WARREN likely secondary to volume depletion/dehydration.    -We changed IV fluids to D5W on 2/4, but due to drop in sodium, we discontinued IV fluids on 2/4.    -Restarted IV fluids with D5W at 50 cc/h on 2/5 and stopped ivf's on 2/6.   -Creatinine at 3.03 today; 2.98 on 2/8; 2.93 on 2/7; 3.13 on  2/6; 3.14 on 2/5; 3.27 on 2/4.  -Renal function is around recent baseline.     Hypernatremia  -Resolved.  -Nephrology consulted and feels that hypernatremia likely secondary to volume depletion/dehydration.  Nephrology initially recommended to change IV fluids to D5W at 100 cc/h and recheck sodium every 8 hours on 2/4.  -As sodium had dropped to 139 from 149 in the same day, we stopped D5W.  Sodium trended up to 146 just after midnight on 2/5.  So, not sure if lab drawn close to where IV fluids were being administered, but we did stop IV fluids on 2/4.    -Restarted IV fluids with D5W at 50 cc/h on 2/5 and subsequently discontinued IV fluids on 2/6.  -Sodium at 143 today; 141 on 2/8; 140 on 2/7; 143 on 2/6.  -Nephrology recommends for patient to drink at least 45 ounces of water intake daily at the nursing home.  -Monitor.     Bilateral pleural effusion with pneumonia  -Patient had been on Invanz prior to admission and during this admission and also started on vancomycin during this admission.  -ID consulted and recommends to continue cefepime and continue IV vancomycin and plan to stop the antibiotics with discharge.  -Continue aspiration precautions.  -Monitor.     Hypertension  -Holding home med torsemide.  -Continue Coreg.   -Nephrology is okay to restart amlodipine if needed but recommends to continue to hold torsemide.  -As BP was elevated restarted Norvasc 5 mg daily on 2/8.    -BP appears to be more stable overall today, 2/9.     Hyperlipidemia  -Continue pravastatin.     Chronic anemia  -Hemoglobin appears to be stable.    -Monitor.     Right lower extremity DVT with post thrombotic syndrome  -Monitor.     DVT prophylaxis  -Heparin subcu.

## 2025-02-09 NOTE — CARE PLAN
The patient's goals for the shift include      The clinical goals for the shift include patient to be remain safe this shift    Over the shift, the patient did not make progress toward the following goals. Barriers to progression include cognition. Recommendations to address these barriers include reorient to reality.

## 2025-02-09 NOTE — CARE PLAN
The patient's goals for the shift include  SANTO    The clinical goals for the shift include pt will remain free from fall/injury throughout shift

## 2025-02-10 LAB
ALBUMIN SERPL BCP-MCNC: 2.6 G/DL (ref 3.4–5)
ANION GAP SERPL CALC-SCNC: 20 MMOL/L (ref 10–20)
ATRIAL RATE: 76 BPM
BASOPHILS # BLD AUTO: 0.05 X10*3/UL (ref 0–0.1)
BASOPHILS NFR BLD AUTO: 0.5 %
BUN SERPL-MCNC: 53 MG/DL (ref 6–23)
CALCIUM SERPL-MCNC: 7.8 MG/DL (ref 8.6–10.3)
CHLORIDE SERPL-SCNC: 114 MMOL/L (ref 98–107)
CO2 SERPL-SCNC: 15 MMOL/L (ref 21–32)
CREAT SERPL-MCNC: 2.96 MG/DL (ref 0.5–1.05)
EGFRCR SERPLBLD CKD-EPI 2021: 15 ML/MIN/1.73M*2
EOSINOPHIL # BLD AUTO: 0.17 X10*3/UL (ref 0–0.4)
EOSINOPHIL NFR BLD AUTO: 1.7 %
ERYTHROCYTE [DISTWIDTH] IN BLOOD BY AUTOMATED COUNT: 15.3 % (ref 11.5–14.5)
GLUCOSE BLD MANUAL STRIP-MCNC: 71 MG/DL (ref 74–99)
GLUCOSE BLD MANUAL STRIP-MCNC: 82 MG/DL (ref 74–99)
GLUCOSE SERPL-MCNC: 67 MG/DL (ref 74–99)
HCT VFR BLD AUTO: 33.8 % (ref 36–46)
HGB BLD-MCNC: 10.8 G/DL (ref 12–16)
IMM GRANULOCYTES # BLD AUTO: 0.05 X10*3/UL (ref 0–0.5)
IMM GRANULOCYTES NFR BLD AUTO: 0.5 % (ref 0–0.9)
LYMPHOCYTES # BLD AUTO: 0.53 X10*3/UL (ref 0.8–3)
LYMPHOCYTES NFR BLD AUTO: 5.2 %
MAGNESIUM SERPL-MCNC: 2.05 MG/DL (ref 1.6–2.4)
MCH RBC QN AUTO: 29.3 PG (ref 26–34)
MCHC RBC AUTO-ENTMCNC: 32 G/DL (ref 32–36)
MCV RBC AUTO: 92 FL (ref 80–100)
MONOCYTES # BLD AUTO: 0.9 X10*3/UL (ref 0.05–0.8)
MONOCYTES NFR BLD AUTO: 8.9 %
NEUTROPHILS # BLD AUTO: 8.46 X10*3/UL (ref 1.6–5.5)
NEUTROPHILS NFR BLD AUTO: 83.2 %
NRBC BLD-RTO: 0 /100 WBCS (ref 0–0)
PHOSPHATE SERPL-MCNC: 4.9 MG/DL (ref 2.5–4.9)
PLATELET # BLD AUTO: 258 X10*3/UL (ref 150–450)
POTASSIUM SERPL-SCNC: 4.5 MMOL/L (ref 3.5–5.3)
Q ONSET: 223 MS
QRS COUNT: 12 BEATS
QRS DURATION: 80 MS
QT INTERVAL: 432 MS
QTC CALCULATION(BAZETT): 479 MS
QTC FREDERICIA: 463 MS
R AXIS: -30 DEGREES
RBC # BLD AUTO: 3.69 X10*6/UL (ref 4–5.2)
SODIUM SERPL-SCNC: 144 MMOL/L (ref 136–145)
T AXIS: 49 DEGREES
T OFFSET: 439 MS
VENTRICULAR RATE: 74 BPM
WBC # BLD AUTO: 10.2 X10*3/UL (ref 4.4–11.3)

## 2025-02-10 PROCEDURE — 2500000004 HC RX 250 GENERAL PHARMACY W/ HCPCS (ALT 636 FOR OP/ED): Performed by: INTERNAL MEDICINE

## 2025-02-10 PROCEDURE — 83735 ASSAY OF MAGNESIUM: CPT | Performed by: INTERNAL MEDICINE

## 2025-02-10 PROCEDURE — 80069 RENAL FUNCTION PANEL: CPT | Performed by: INTERNAL MEDICINE

## 2025-02-10 PROCEDURE — 85025 COMPLETE CBC W/AUTO DIFF WBC: CPT | Performed by: INTERNAL MEDICINE

## 2025-02-10 PROCEDURE — 94760 N-INVAS EAR/PLS OXIMETRY 1: CPT

## 2025-02-10 PROCEDURE — 1100000001 HC PRIVATE ROOM DAILY

## 2025-02-10 PROCEDURE — 36415 COLL VENOUS BLD VENIPUNCTURE: CPT | Performed by: INTERNAL MEDICINE

## 2025-02-10 PROCEDURE — 2500000001 HC RX 250 WO HCPCS SELF ADMINISTERED DRUGS (ALT 637 FOR MEDICARE OP): Performed by: NURSE PRACTITIONER

## 2025-02-10 PROCEDURE — 2500000001 HC RX 250 WO HCPCS SELF ADMINISTERED DRUGS (ALT 637 FOR MEDICARE OP): Performed by: INTERNAL MEDICINE

## 2025-02-10 PROCEDURE — 2500000005 HC RX 250 GENERAL PHARMACY W/O HCPCS: Performed by: INTERNAL MEDICINE

## 2025-02-10 PROCEDURE — 99232 SBSQ HOSP IP/OBS MODERATE 35: CPT | Performed by: INTERNAL MEDICINE

## 2025-02-10 PROCEDURE — 82947 ASSAY GLUCOSE BLOOD QUANT: CPT

## 2025-02-10 RX ORDER — DEXTROSE MONOHYDRATE AND SODIUM CHLORIDE 5; .45 G/100ML; G/100ML
50 INJECTION, SOLUTION INTRAVENOUS CONTINUOUS
Status: DISCONTINUED | OUTPATIENT
Start: 2025-02-10 | End: 2025-02-11

## 2025-02-10 RX ADMIN — AMLODIPINE BESYLATE 5 MG: 5 TABLET ORAL at 08:43

## 2025-02-10 RX ADMIN — HEPARIN SODIUM 5000 UNITS: 5000 INJECTION INTRAVENOUS; SUBCUTANEOUS at 08:44

## 2025-02-10 RX ADMIN — SODIUM BICARBONATE 650 MG: 650 TABLET ORAL at 08:43

## 2025-02-10 RX ADMIN — BENZONATATE 200 MG: 100 CAPSULE ORAL at 08:43

## 2025-02-10 RX ADMIN — LIDOCAINE 4% 1 PATCH: 40 PATCH TOPICAL at 08:44

## 2025-02-10 RX ADMIN — ASPIRIN 81 MG: 81 TABLET, COATED ORAL at 08:43

## 2025-02-10 RX ADMIN — Medication 1 TABLET: at 08:43

## 2025-02-10 RX ADMIN — CETIRIZINE HYDROCHLORIDE 5 MG: 10 TABLET, FILM COATED ORAL at 08:43

## 2025-02-10 RX ADMIN — DEXTROSE AND SODIUM CHLORIDE 50 ML/HR: 5; 450 INJECTION, SOLUTION INTRAVENOUS at 22:02

## 2025-02-10 RX ADMIN — HEPARIN SODIUM 5000 UNITS: 5000 INJECTION INTRAVENOUS; SUBCUTANEOUS at 15:30

## 2025-02-10 RX ADMIN — CARVEDILOL 12.5 MG: 12.5 TABLET, FILM COATED ORAL at 08:44

## 2025-02-10 ASSESSMENT — PAIN SCALES - GENERAL: PAINLEVEL_OUTOF10: 0 - NO PAIN

## 2025-02-10 ASSESSMENT — COGNITIVE AND FUNCTIONAL STATUS - GENERAL
STANDING UP FROM CHAIR USING ARMS: A LOT
DAILY ACTIVITIY SCORE: 13
DRESSING REGULAR LOWER BODY CLOTHING: A LOT
DRESSING REGULAR UPPER BODY CLOTHING: A LOT
STANDING UP FROM CHAIR USING ARMS: A LOT
DRESSING REGULAR LOWER BODY CLOTHING: A LOT
CLIMB 3 TO 5 STEPS WITH RAILING: TOTAL
TOILETING: A LOT
HELP NEEDED FOR BATHING: A LOT
MOBILITY SCORE: 11
MOVING FROM LYING ON BACK TO SITTING ON SIDE OF FLAT BED WITH BEDRAILS: A LITTLE
TURNING FROM BACK TO SIDE WHILE IN FLAT BAD: A LOT
DRESSING REGULAR UPPER BODY CLOTHING: A LOT
TURNING FROM BACK TO SIDE WHILE IN FLAT BAD: A LOT
PERSONAL GROOMING: A LOT
MOVING FROM LYING ON BACK TO SITTING ON SIDE OF FLAT BED WITH BEDRAILS: A LITTLE
WALKING IN HOSPITAL ROOM: TOTAL
EATING MEALS: A LITTLE
PERSONAL GROOMING: A LOT
WALKING IN HOSPITAL ROOM: TOTAL
DAILY ACTIVITIY SCORE: 13
TOILETING: A LOT
MOVING TO AND FROM BED TO CHAIR: A LOT
MOVING TO AND FROM BED TO CHAIR: A LOT
CLIMB 3 TO 5 STEPS WITH RAILING: TOTAL
EATING MEALS: A LITTLE
HELP NEEDED FOR BATHING: A LOT
MOBILITY SCORE: 11

## 2025-02-10 ASSESSMENT — PAIN - FUNCTIONAL ASSESSMENT: PAIN_FUNCTIONAL_ASSESSMENT: 0-10

## 2025-02-10 NOTE — PROGRESS NOTES
02/10/25 0832   Discharge Planning   Living Arrangements Other (Comment)  (from [facility])   Support Systems Children   Assistance Needed baseline A&Ox3-4;assist for ADLs and self propels in wheelchair; room air baseline and currently room air   Type of Residence Nursing home/residential care   Do you have animals or pets at home? No   Home or Post Acute Services Post acute facilities (Rehab/SNF/etc)   Type of Post Acute Facility Services Long term care   Expected Discharge Disposition Inter  (Spoke with pt's dtr at the bedside, preference for pt to return to [facility] when medically ready. No precert)   Does the patient need discharge transport arranged? Yes   RoundTrip coordination needed? Yes   Has discharge transport been arranged? No

## 2025-02-10 NOTE — CARE PLAN
The patient's goals for the shift include  SANTO    The clinical goals for the shift include pt will sleep throughout shift

## 2025-02-10 NOTE — PROGRESS NOTES
"Physical Therapy                 Therapy Communication Note    Patient Name: Camelia Jones  MRN: [id number]  Department: [address]  Room: [address] [place] [address]A  Today's Date: 2/10/2025     Discipline: Physical Therapy    PT Missed Visit: Yes     Missed Visit Reason: Missed Visit Reason: Patient refused (Pt. cleared for PT tx by RN who noted pt. has been sleeping for long periods of time.  Upon arrival to room, pt. very lethargic, had difficulty keeping eyes open with constant verbal/tactile cues.  Attempted to engage pt. in AAROM/PROM to BLE however pt. Cried out in pain stating \"please leave me alone. \"I don't want to do this.\"  Communicated with RN that pt has made very little progress and wondering if pallative or hospice consult should be considered.  )    Missed Time: Attempt    Comment: Time 1419- 0516  "

## 2025-02-10 NOTE — PROGRESS NOTES
"  Subjective    Patient denies chest pain, shortness of breath, nausea, vomiting or diarrhea.  Patient's daughter is at the bedside and states that she slept most the day yesterday, but when she did awaken in the late afternoon/evening she did not appear to be hallucinating.  I spoke with patient's nurse who also states she slept overnight last night as well.  Patient does report feeling tired this morning.    Objective    Vitals  Visit Vitals  /78 (BP Location: Right arm, Patient Position: Lying)   Pulse 85   Temp 36.6 °C (97.9 °F) (Temporal)   Resp 17   Ht 1.575 m (5' 2.01\")   Wt 57.4 kg (126 lb 8.7 oz)   SpO2 95%   BMI 23.14 kg/m²   OB Status Postmenopausal   Smoking Status Never   BSA 1.58 m²       Physical Exam   General: Alert and oriented to self, birthday, year but not to the president.  No acute distress.   HEENT: Sclera clear.   CVS: RRR.   Lungs: CTAB.  Abdomen: Soft.  Nontender.  Bowel sounds present.        Extremities: No significant pitting edema bilat ankles.    Psychiatric: Cooperative.     IOs    Intake/Output Summary (Last 24 hours) at 2/10/2025 1349  Last data filed at 2/10/2025 0946  Gross per 24 hour   Intake 200 ml   Output 450 ml   Net -250 ml       Labs:   Results from last 72 hours   Lab Units 02/10/25  0642 02/09/25  0656 02/08/25  0613   SODIUM mmol/L 144 143 141   POTASSIUM mmol/L 4.5 4.4 4.2   CHLORIDE mmol/L 114* 111* 108*   CO2 mmol/L 15* 17* 17*   BUN mg/dL 53* 51* 49*   CREATININE mg/dL 2.96* 3.03* 2.98*   GLUCOSE mg/dL 67* 92 91   CALCIUM mg/dL 7.8* 8.1* 7.8*   ANION GAP mmol/L 20 19 20   EGFR mL/min/1.73m*2 15* 14* 14*   PHOSPHORUS mg/dL 4.9 4.3 4.3      Results from last 72 hours   Lab Units 02/10/25  0642 02/09/25  0656 02/08/25  0613   WBC AUTO x10*3/uL 10.2 8.4 9.5   HEMOGLOBIN g/dL 10.8* 12.0 11.9*   HEMATOCRIT % 33.8* 37.3 36.8   PLATELETS AUTO x10*3/uL 258 288 289   NEUTROS PCT AUTO % 83.2 76.8 81.4   LYMPHS PCT AUTO % 5.2 8.6 7.8   MONOS PCT AUTO % 8.9 10.6 7.3   EOS " PCT AUTO % 1.7 2.7 2.2      Lab Results   Component Value Date    CALCIUM 7.8 (L) 02/10/2025    PHOS 4.9 02/10/2025      Lab Results   Component Value Date    CRP 5.67 (H) 02/04/2025      [unfilled]       Images  ECG 12 lead  Accelerated Junctional rhythm  Left axis deviation  Septal infarct , age undetermined  Abnormal ECG  When compared with ECG of 02-FEB-2025 15:59, (unconfirmed)  Junctional rhythm has replaced Sinus rhythm  Septal infarct is now Present  T wave inversion no longer evident in Lateral leads      Meds  Scheduled medications  acetaminophen, 975 mg, oral, q8h  amLODIPine, 5 mg, oral, Daily  aspirin, 81 mg, oral, Daily  B complex-vitamin C, 1 tablet, oral, Daily  benzonatate, 200 mg, oral, TID  carvedilol, 12.5 mg, oral, BID  cetirizine, 5 mg, oral, Daily  [Held by provider] ferrous sulfate (325 mg ferrous sulfate), 65 mg of iron, oral, Daily  gabapentin, 100 mg, oral, Nightly  heparin (porcine), 5,000 Units, subcutaneous, q8h LATA  lidocaine, 1 patch, transdermal, Daily  [Held by provider] polyethylene glycol, 17 g, oral, BID  pravastatin, 20 mg, oral, Daily  [Held by provider] sennosides-docusate sodium, 2 tablet, oral, BID  sodium bicarbonate, 650 mg, oral, BID  [Held by provider] torsemide, 10 mg, oral, Daily      Continuous medications     PRN medications  PRN medications: dextromethorphan-guaifenesin, ipratropium-albuteroL, OLANZapine **OR** OLANZapine, ondansetron, prochlorperazine     Assessment and Plan    Camelia Jones is a 89 y.o. female from ECF with PMH HTN, HLD, chronic anemia, CKD IV, chronic LE edema, pancreatitis, RLE DVT with post thrombotic syndrome, hx sacral ala / L5 fx, chronic hyponatremia, admitted with acute metabolic encephalopathy with azotemia with suspected uremic syndrome; WARREN/CKD stage IV; hypernatremia; bilateral pleural effusion; recent pneumonia.      Acute metabolic encephalopathy  -Likely secondary to dehydration with hypernatremia, acute kidney injury and  pneumonia.  -Continue to treat underlying hypernatremia, WARREN and pneumonia.  -Repeat CT scan of the head on 2/7/25 with generalized cerebral and cerebellar atrophy; no acute findings.  -Repeated the CT scan on 2/7 to see if by chance there were any strokes present that were not seen on initial CT scan.  -Patient's confusion may be due to patient having some underlying dementia and due to her acute events and being on a foreign environment that she may have more issues with altered mental status.  -Started on Zyprexa 2.5 mg every 6 hours as needed and reviewed EKG from 2/2/2025 with QTc interval of 471 MS and repeat EKG on 2/7/2025 with QTc interval 479 MS.  -Patient slept most of the day yesterday, 2/9, and then again last night, but essentially had been awake for almost 48 hours prior to 2/9 and had received ativan and zyprex in the evening/night of 2/8.  Patient may have some underlying dementia as stated above but also may have some hospital-acquired delirium and initial encephalopathy from hypernatremia, WARREN and pneumonia.  Daughter is at the bedside and feels that patient is better today as well from her mental status.  -Monitor.  -Patient's daughter was at the bedside and all of their questions were answered.     WARREN with CKD stage IV  -On oral bicarb tabs.  -Nephrology consulted and feels that WARREN likely secondary to volume depletion/dehydration.    -We changed IV fluids to D5W on 2/4, but due to drop in sodium, we discontinued IV fluids on 2/4.    -Restarted IV fluids with D5W at 50 cc/h on 2/5 and stopped ivf's on 2/6.   -Creatinine at 2.96 today; 3.03 on 2/9; 2.98 on 2/8; 2.93 on 2/7; 3.13 on 2/6; 3.14 on 2/5; 3.27 on 2/4.  -Renal function is around recent baseline.  -Bicarb is slightly worse today, 2/10, at 15.  Patient slept quite a bit over the last 24 hours.  Will encourage patient to take medications and drink more fluids today, 2/10.     Hypernatremia  -Resolved.  -Nephrology consulted and feels that  hypernatremia likely secondary to volume depletion/dehydration.  Nephrology initially recommended to change IV fluids to D5W at 100 cc/h and recheck sodium every 8 hours on 2/4.  -As sodium had dropped to 139 from 149 in the same day, we stopped D5W.  Sodium trended up to 146 just after midnight on 2/5.  So, not sure if lab drawn close to where IV fluids were being administered, but we did stop IV fluids on 2/4.    -Restarted IV fluids with D5W at 50 cc/h on 2/5 and subsequently discontinued IV fluids on 2/6.  -Sodium at 144 today; 143 on 2/9; 141 on 2/8; 140 on 2/7; 143 on 2/6.  -Nephrology recommends for patient to drink at least 45 ounces of water intake daily at the nursing home.  -Increase in sodium today to suction, may be secondary to decreased oral intake as patient was sleeping a lot the last 24 hours.  Recommend to increase oral intake and monitor.  -Monitor.     Bilateral pleural effusion with pneumonia  -Patient had been on Invanz prior to admission and during this admission and also started on vancomycin during this admission.  -ID consulted and recommends to discontinue cefepime and disontinue IV vancomycin.  -Continue aspiration precautions.  -Monitor.     Hypertension  -Holding home med torsemide.  -Continue Coreg.   -Nephrology is okay to restart amlodipine if needed but recommends to continue to hold torsemide.  -As BP was elevated restarted Norvasc 5 mg daily on 2/8.    -BP appears to be more stable over the past 24 hours.  Monitor.    Mild hypoglycemia  -Glucose down to 67 this morning, 2/10.  This may be secondary to decreased oral intake as patient has been sleeping a significant mount over the past 24 hours.  Patient is more awake today and will encourage oral intake and monitor glucose.     Hyperlipidemia  -Continue pravastatin.     Chronic anemia  -Hemoglobin appears to be stable.    -Monitor.     Right lower extremity DVT with post thrombotic syndrome  -Monitor.     DVT  prophylaxis  -Heparin subcu.    Disposition:  -Plan to discharge patient back to long-term nursing facility.  This patient was basically sleeping most the time of the past 24 hours or so, encouraging oral intake today and taking her medications and we will see if bicarb is more stable on 2/11 and if sodium remains stable.  Then, look to discharge back to long-term nursing facility likely tomorrow or on Wednesday.

## 2025-02-10 NOTE — CARE PLAN
The patient's goals for the shift include  pt will have decreased drowsiness throughout shift.    The clinical goals for the shift include pt will sleep throughout shift      Problem: Pain - Adult  Goal: Verbalizes/displays adequate comfort level or baseline comfort level  Outcome: Progressing     Problem: Safety - Adult  Goal: Free from fall injury  Outcome: Progressing     Problem: Discharge Planning  Goal: Discharge to home or other facility with appropriate resources  Outcome: Progressing     Problem: Chronic Conditions and Co-morbidities  Goal: Patient's chronic conditions and co-morbidity symptoms are monitored and maintained or improved  Outcome: Progressing     Problem: Nutrition  Goal: Nutrient intake appropriate for maintaining nutritional needs  Outcome: Progressing     Problem: Skin  Goal: Decreased wound size/increased tissue granulation at next dressing change  Outcome: Progressing  Goal: Participates in plan/prevention/treatment measures  Outcome: Progressing  Goal: Prevent/manage excess moisture  Outcome: Progressing  Goal: Prevent/minimize sheer/friction injuries  Outcome: Progressing  Flowsheets (Taken 2/10/2025 5207)  Prevent/minimize sheer/friction injuries: Turn/reposition every 2 hours/use positioning/transfer devices  Goal: Promote/optimize nutrition  Outcome: Progressing  Goal: Promote skin healing  Outcome: Progressing     Problem: Fall/Injury  Goal: Not fall by end of shift  Outcome: Progressing  Goal: Be free from injury by end of the shift  Outcome: Progressing  Goal: Verbalize understanding of personal risk factors for fall in the hospital  Outcome: Progressing  Goal: Verbalize understanding of risk factor reduction measures to prevent injury from fall in the home  Outcome: Progressing  Goal: Use assistive devices by end of the shift  Outcome: Progressing  Goal: Pace activities to prevent fatigue by end of the shift  Outcome: Progressing

## 2025-02-10 NOTE — PROGRESS NOTES
Camelia Jones is a 89 y.o. female on day 8 of admission presenting with Hypernatremia.    Subjective   Interval History: no fever, no new complaints        Review of Systems    Objective   Range of Vitals (last 24 hours)  Heart Rate:  [79-85]   Temp:  [36.6 °C (97.9 °F)-37.4 °C (99.3 °F)]   Resp:  [16-22]   BP: (118-147)/(71-78)   Weight:  [57.4 kg (126 lb 8.7 oz)]   SpO2:  [94 %-97 %]   Daily Weight  02/10/25 : 57.4 kg (126 lb 8.7 oz)    Body mass index is 23.14 kg/m².    Physical Exam  Constitutional:       Appearance: Normal appearance.   HENT:      Head: Normocephalic and atraumatic.      Mouth/Throat:      Mouth: Mucous membranes are moist.      Pharynx: Oropharynx is clear.   Eyes:      Pupils: Pupils are equal, round, and reactive to light.   Cardiovascular:      Rate and Rhythm: Normal rate and regular rhythm.      Heart sounds: Normal heart sounds.   Pulmonary:      Effort: Pulmonary effort is normal.      Breath sounds: Normal breath sounds.   Abdominal:      General: Abdomen is flat. Bowel sounds are normal.      Palpations: Abdomen is soft.   Musculoskeletal:      Cervical back: Normal range of motion.   Neurological:      Mental Status: She is alert.         Antibiotics  cefepime - 1 gram/50 mL  ertapenem - 1 gram  vancomycin    Relevant Results  Labs  Results from last 72 hours   Lab Units 02/10/25  0642 02/09/25  0656 02/08/25  0613   WBC AUTO x10*3/uL 10.2 8.4 9.5   HEMOGLOBIN g/dL 10.8* 12.0 11.9*   HEMATOCRIT % 33.8* 37.3 36.8   PLATELETS AUTO x10*3/uL 258 288 289   NEUTROS PCT AUTO % 83.2 76.8 81.4   LYMPHS PCT AUTO % 5.2 8.6 7.8   MONOS PCT AUTO % 8.9 10.6 7.3   EOS PCT AUTO % 1.7 2.7 2.2     Results from last 72 hours   Lab Units 02/10/25  0642 02/09/25  0656 02/08/25  0613   SODIUM mmol/L 144 143 141   POTASSIUM mmol/L 4.5 4.4 4.2   CHLORIDE mmol/L 114* 111* 108*   CO2 mmol/L 15* 17* 17*   BUN mg/dL 53* 51* 49*   CREATININE mg/dL 2.96* 3.03* 2.98*   GLUCOSE mg/dL 67* 92 91   CALCIUM mg/dL  7.8* 8.1* 7.8*   ANION GAP mmol/L 20 19 20   EGFR mL/min/1.73m*2 15* 14* 14*   PHOSPHORUS mg/dL 4.9 4.3 4.3     Results from last 72 hours   Lab Units 02/10/25  0642 02/09/25  0656 02/08/25  0613   ALBUMIN g/dL 2.6* 3.0* 3.0*     Estimated Creatinine Clearance: 10 mL/min (A) (by C-G formula based on SCr of 2.96 mg/dL (H)).  C-Reactive Protein   Date Value Ref Range Status   02/04/2025 5.67 (H) <1.00 mg/dL Final   02/03/2025 5.73 (H) <1.00 mg/dL Final   02/02/2025 4.75 (H) <1.00 mg/dL Final     Microbiology  Reviewed  Imaging  Reviewed        Assessment/Plan   Pneumonia / effusion / atelectasis, procalcitonin 0.46, MRSA screen positive  Pyuria, negative culture  Encephalopathy, likely metabolic     Recommendations :  Discontinue Cefepime and Vancomycin,  Discussed with the medical team     I spent minutes in the professional and overall care of this patient.      Kostas Miller MD

## 2025-02-11 ENCOUNTER — APPOINTMENT (OUTPATIENT)
Dept: RADIOLOGY | Facility: HOSPITAL | Age: OVER 89
End: 2025-02-11
Payer: COMMERCIAL

## 2025-02-11 LAB
ALBUMIN SERPL BCP-MCNC: 2.6 G/DL (ref 3.4–5)
ANION GAP SERPL CALC-SCNC: 18 MMOL/L (ref 10–20)
ATRIAL RATE: 75 BPM
BASOPHILS # BLD AUTO: 0.04 X10*3/UL (ref 0–0.1)
BASOPHILS NFR BLD AUTO: 0.6 %
BUN SERPL-MCNC: 54 MG/DL (ref 6–23)
CALCIUM SERPL-MCNC: 7.7 MG/DL (ref 8.6–10.3)
CHLORIDE SERPL-SCNC: 115 MMOL/L (ref 98–107)
CO2 SERPL-SCNC: 17 MMOL/L (ref 21–32)
CREAT SERPL-MCNC: 3.24 MG/DL (ref 0.5–1.05)
EGFRCR SERPLBLD CKD-EPI 2021: 13 ML/MIN/1.73M*2
EOSINOPHIL # BLD AUTO: 0.27 X10*3/UL (ref 0–0.4)
EOSINOPHIL NFR BLD AUTO: 3.8 %
ERYTHROCYTE [DISTWIDTH] IN BLOOD BY AUTOMATED COUNT: 15.7 % (ref 11.5–14.5)
GLUCOSE BLD MANUAL STRIP-MCNC: 103 MG/DL (ref 74–99)
GLUCOSE BLD MANUAL STRIP-MCNC: 105 MG/DL (ref 74–99)
GLUCOSE BLD MANUAL STRIP-MCNC: 114 MG/DL (ref 74–99)
GLUCOSE BLD MANUAL STRIP-MCNC: 116 MG/DL (ref 74–99)
GLUCOSE SERPL-MCNC: 103 MG/DL (ref 74–99)
HCT VFR BLD AUTO: 35.7 % (ref 36–46)
HGB BLD-MCNC: 10.8 G/DL (ref 12–16)
IMM GRANULOCYTES # BLD AUTO: 0.09 X10*3/UL (ref 0–0.5)
IMM GRANULOCYTES NFR BLD AUTO: 1.3 % (ref 0–0.9)
LYMPHOCYTES # BLD AUTO: 0.59 X10*3/UL (ref 0.8–3)
LYMPHOCYTES NFR BLD AUTO: 8.3 %
MAGNESIUM SERPL-MCNC: 2.12 MG/DL (ref 1.6–2.4)
MCH RBC QN AUTO: 29 PG (ref 26–34)
MCHC RBC AUTO-ENTMCNC: 30.3 G/DL (ref 32–36)
MCV RBC AUTO: 96 FL (ref 80–100)
MONOCYTES # BLD AUTO: 0.81 X10*3/UL (ref 0.05–0.8)
MONOCYTES NFR BLD AUTO: 11.4 %
NEUTROPHILS # BLD AUTO: 5.3 X10*3/UL (ref 1.6–5.5)
NEUTROPHILS NFR BLD AUTO: 74.6 %
NRBC BLD-RTO: 0 /100 WBCS (ref 0–0)
P AXIS: 59 DEGREES
P OFFSET: 160 MS
P ONSET: 122 MS
PHOSPHATE SERPL-MCNC: 4.6 MG/DL (ref 2.5–4.9)
PLATELET # BLD AUTO: 297 X10*3/UL (ref 150–450)
POTASSIUM SERPL-SCNC: 4.3 MMOL/L (ref 3.5–5.3)
PR INTERVAL: 194 MS
Q ONSET: 219 MS
QRS COUNT: 12 BEATS
QRS DURATION: 76 MS
QT INTERVAL: 422 MS
QTC CALCULATION(BAZETT): 471 MS
QTC FREDERICIA: 454 MS
R AXIS: -44 DEGREES
RBC # BLD AUTO: 3.73 X10*6/UL (ref 4–5.2)
SODIUM SERPL-SCNC: 146 MMOL/L (ref 136–145)
T AXIS: 103 DEGREES
T OFFSET: 430 MS
VENTRICULAR RATE: 75 BPM
WBC # BLD AUTO: 7.1 X10*3/UL (ref 4.4–11.3)

## 2025-02-11 PROCEDURE — 80069 RENAL FUNCTION PANEL: CPT | Performed by: INTERNAL MEDICINE

## 2025-02-11 PROCEDURE — 70551 MRI BRAIN STEM W/O DYE: CPT

## 2025-02-11 PROCEDURE — 2500000001 HC RX 250 WO HCPCS SELF ADMINISTERED DRUGS (ALT 637 FOR MEDICARE OP): Performed by: INTERNAL MEDICINE

## 2025-02-11 PROCEDURE — 85025 COMPLETE CBC W/AUTO DIFF WBC: CPT | Performed by: INTERNAL MEDICINE

## 2025-02-11 PROCEDURE — 2500000005 HC RX 250 GENERAL PHARMACY W/O HCPCS: Performed by: INTERNAL MEDICINE

## 2025-02-11 PROCEDURE — 36415 COLL VENOUS BLD VENIPUNCTURE: CPT | Performed by: INTERNAL MEDICINE

## 2025-02-11 PROCEDURE — 83735 ASSAY OF MAGNESIUM: CPT | Performed by: INTERNAL MEDICINE

## 2025-02-11 PROCEDURE — 2500000001 HC RX 250 WO HCPCS SELF ADMINISTERED DRUGS (ALT 637 FOR MEDICARE OP): Performed by: NURSE PRACTITIONER

## 2025-02-11 PROCEDURE — 2500000004 HC RX 250 GENERAL PHARMACY W/ HCPCS (ALT 636 FOR OP/ED): Performed by: INTERNAL MEDICINE

## 2025-02-11 PROCEDURE — 82947 ASSAY GLUCOSE BLOOD QUANT: CPT

## 2025-02-11 PROCEDURE — 94760 N-INVAS EAR/PLS OXIMETRY 1: CPT

## 2025-02-11 PROCEDURE — 99232 SBSQ HOSP IP/OBS MODERATE 35: CPT | Performed by: INTERNAL MEDICINE

## 2025-02-11 PROCEDURE — 1200000002 HC GENERAL ROOM WITH TELEMETRY DAILY

## 2025-02-11 PROCEDURE — 70551 MRI BRAIN STEM W/O DYE: CPT | Performed by: RADIOLOGY

## 2025-02-11 RX ADMIN — SODIUM BICARBONATE 75 ML/HR: 84 INJECTION INTRAVENOUS at 18:08

## 2025-02-11 RX ADMIN — CARVEDILOL 12.5 MG: 12.5 TABLET, FILM COATED ORAL at 20:53

## 2025-02-11 RX ADMIN — CETIRIZINE HYDROCHLORIDE 5 MG: 10 TABLET, FILM COATED ORAL at 11:35

## 2025-02-11 RX ADMIN — LIDOCAINE 4% 1 PATCH: 40 PATCH TOPICAL at 11:40

## 2025-02-11 RX ADMIN — BENZONATATE 200 MG: 100 CAPSULE ORAL at 11:35

## 2025-02-11 RX ADMIN — PRAVASTATIN SODIUM 20 MG: 40 TABLET ORAL at 20:57

## 2025-02-11 RX ADMIN — HEPARIN SODIUM 5000 UNITS: 5000 INJECTION INTRAVENOUS; SUBCUTANEOUS at 17:13

## 2025-02-11 RX ADMIN — HEPARIN SODIUM 5000 UNITS: 5000 INJECTION INTRAVENOUS; SUBCUTANEOUS at 10:23

## 2025-02-11 RX ADMIN — HEPARIN SODIUM 5000 UNITS: 5000 INJECTION INTRAVENOUS; SUBCUTANEOUS at 01:58

## 2025-02-11 RX ADMIN — CARVEDILOL 12.5 MG: 12.5 TABLET, FILM COATED ORAL at 11:35

## 2025-02-11 RX ADMIN — AMLODIPINE BESYLATE 5 MG: 5 TABLET ORAL at 11:34

## 2025-02-11 RX ADMIN — ACETAMINOPHEN 650 MG: 325 TABLET, FILM COATED ORAL at 20:55

## 2025-02-11 ASSESSMENT — COGNITIVE AND FUNCTIONAL STATUS - GENERAL
MOVING TO AND FROM BED TO CHAIR: A LOT
STANDING UP FROM CHAIR USING ARMS: A LOT
TOILETING: A LOT
CLIMB 3 TO 5 STEPS WITH RAILING: TOTAL
DAILY ACTIVITIY SCORE: 12
EATING MEALS: A LOT
HELP NEEDED FOR BATHING: A LOT
DRESSING REGULAR LOWER BODY CLOTHING: A LOT
WALKING IN HOSPITAL ROOM: TOTAL
DRESSING REGULAR UPPER BODY CLOTHING: A LOT
TURNING FROM BACK TO SIDE WHILE IN FLAT BAD: A LOT
PERSONAL GROOMING: A LOT
MOBILITY SCORE: 11
MOVING FROM LYING ON BACK TO SITTING ON SIDE OF FLAT BED WITH BEDRAILS: A LITTLE

## 2025-02-11 ASSESSMENT — PAIN SCALES - GENERAL
PAINLEVEL_OUTOF10: 0 - NO PAIN
PAINLEVEL_OUTOF10: 2
PAINLEVEL_OUTOF10: 0 - NO PAIN

## 2025-02-11 NOTE — CARE PLAN
The patient's goals for the shift include      The clinical goals for the shift include Patient to increasePO intake this shift      Assumed care of patient at 0700, drowsy, responds to voice. Patient refusing morning meds and breakfast. Daughter Felicia at bedside, after mult attempts patient took BP meds and Tessalon Pearls. Lidocaine patch applied to low back. VSS, Call light, bedside table and personal belongings within reach. Will cont to monitor.

## 2025-02-11 NOTE — CONSULTS
"Nutrition Initial Assessment:   Nutrition Assessment    Reason for Assessment: Length of stay    Patient is a 89 y.o. female presenting from LTC facility w/ several days of AMS & decreased PO intake.     Admitted for bilateral pleural effusion w/ PNA, WARREN on CKD, hypernatremia, & suspected uremic syndrome.     CT head (2/7) unremarkable, question if component of underlying dementia present.     PMH: HTN, HLD, Chronic anemia, CKD IV, Chronic LE edema, Pancreatitis, RLE DVT w/ post thrombotic syndrome, Hx sacral ala/L5 fx, & Chronic hyponatremia     Nutrition History:  Food and Nutrient History: Visit made, pt sleeping in bed w/ daughter at bedside. Daughter reports pt usually eats well at her LTC facility & is independent at meals. However, notes pt started to decline a couple of weeks ago & now has had no significant PO intake x4 days. Daughter also reports motility issues & reduced ability to feed herself. Notes pt drinks x1 carton/day of either vanilla [employer] Renal or berry Nepro. Is amenable to pt receiving Nepro BID this admission. No additional needs noted at this time.  Vitamin/Herbal Supplement Use: B-complex w/ vitamin C + folic acid, Iron, Biotin, Calcium carbonate, Vitamin B12, Krill oil, Omega-3 esters, & Zinc     Anthropometrics:  Height: 157.5 cm (5' 2.01\")   Weight: 56.5 kg (124 lb 9 oz)   BMI (Calculated): 22.78  IBW/kg (Dietitian Calculated): 50 kg  Percent of IBW: 113 %     Weight History:   Wt Readings from Last 60 Encounters:   02/11/25 56.5 kg (124 lb 9 oz) --> Current wt   02/02/25 58.6 kg (129 lb) --> Admit wt   12/27/24 58.5 kg (129 lb)   11/07/24 56.8 kg (125 lb)   02/01/24 52.7 kg (116 lb)     Weight Change %:  Weight History / % Weight Change: 3.5% weight loss x 9 days since admission  Significant Weight Loss: Yes  Interpretation of Weight Loss: >2% in 1 week    Nutrition Focused Physical Exam Findings:  Subcutaneous Fat Loss:   Orbital Fat Pads: Severe (dark circles, hollowing and " loose skin)  Buccal Fat Pads: Mild-Moderate (flat cheeks, minimal bounce)  Muscle Wasting:  Temporalis: Severe (hollowed scooping depression)  Interosseous: Severe (depressed area between thumb and forefinger)  Edema:  Edema: +1 trace (BLE)  Physical Findings:  Hair: Negative  Eyes: Negative  Nails: Negative  Skin:  (R heel wound)  Digestive System Findings:  (None currently, daughter notes fear of developing constipation given pt won't take enough liquids to take her usual Miralax)  Mouth Findings:  (None)    Nutrition Significant Labs:  BMP Trend:   Results from last 7 days   Lab Units 02/11/25  0838 02/10/25  0642 02/09/25  0656 02/08/25  0613   GLUCOSE mg/dL 103* 67* 92 91   CALCIUM mg/dL 7.7* 7.8* 8.1* 7.8*   SODIUM mmol/L 146* 144 143 141   POTASSIUM mmol/L 4.3 4.5 4.4 4.2   CO2 mmol/L 17* 15* 17* 17*   CHLORIDE mmol/L 115* 114* 111* 108*   BUN mg/dL 54* 53* 51* 49*   CREATININE mg/dL 3.24* 2.96* 3.03* 2.98*    , Renal Lab Trend:   Results from last 7 days   Lab Units 02/11/25  0838 02/10/25  0642 02/09/25  0656 02/08/25  0613   POTASSIUM mmol/L 4.3 4.5 4.4 4.2   PHOSPHORUS mg/dL 4.6 4.9 4.3 4.3   SODIUM mmol/L 146* 144 143 141   MAGNESIUM mg/dL 2.12 2.05 2.04 2.06   EGFR mL/min/1.73m*2 13* 15* 14* 14*   BUN mg/dL 54* 53* 51* 49*   CREATININE mg/dL 3.24* 2.96* 3.03* 2.98*      Nutrition Specific Medications:  Scheduled medications  B complex-vitamin C, 1 tablet, oral, Daily  cetirizine, 5 mg, oral, Daily  [Held by provider] ferrous sulfate (325 mg ferrous sulfate), 65 mg of iron, oral, Daily  [Held by provider] polyethylene glycol, 17 g, oral, BID  [Held by provider] sennosides-docusate sodium, 2 tablet, oral, BID  [Held by provider] torsemide, 10 mg, oral, Daily    Continuous medications  sodium bicarbonate 1 mEq/mL (8.4 %) 150 mEq in dextrose 5% 1,150 mL infusion, 75 mL/hr    I/O:   LBM: 2/8 ; Stool Appearance: Loose (02/05/25 0956)    Dietary Orders (From admission, onward)       Start     Ordered     02/02/25 2034  Adult diet Regular  Diet effective now        Question:  Diet type  Answer:  Regular    02/02/25 2033 02/02/25 2028  May Participate in Room Service  ( ROOM SERVICE MAY PARTICIPATE)  Once        Question:  .  Answer:  Yes    02/02/25 2027             Estimated Needs:   Total Energy Estimated Needs in 24 hours (kCal):  (6041-1749)  Method for Estimating Needs: 25-30 kcals/kg x IBW  Total Protein Estimated Needs in 24 Hours (g):  (50-60)  Method for Estimating 24 Hour Protein Needs: 1.0-1.2 g/kg x IBW  Total Fluid Estimated Needs in 24 Hours (mL):  (per team)  Method for Estimating 24 Hour Fluid Needs: 1mL/kcal or per team        Nutrition Diagnosis   Malnutrition Diagnosis  Patient has Malnutrition Diagnosis: Yes  Diagnosis Status: New  Malnutrition Diagnosis: Moderate malnutrition related to acute disease or injury  Related to: confusion in setting of acute illness  As Evidenced by: reported <75% intake for >7 days resulting in significant 3.5% weight loss since admission 9 days ago      Nutrition Interventions/Recommendations      Nutrition Recommendations:  Continue liberalized regular diet given minimal PO intake  If s/sx of aspiration noted, recommend speech consult for formal bedside swallowing evaluation       Nutrition Interventions/Goals:   Interventions: Medical food supplement  Medical Food Supplement: Commercial beverage medical food supplement therapy  Goal: Nepro Shake BID (420 kcals/19g protein per serving)      Nutrition Monitoring and Evaluation   Food/Nutrient Related History Monitoring  Monitoring and Evaluation Plan: Estimated Energy Intake  Estimated Energy Intake: Energy intake 50 -75% of estimated energy needs    Time Spent (min): 60 minutes

## 2025-02-11 NOTE — PROGRESS NOTES
[facility] Hospitalist Progress Note       [phone]: Please page me for patient care issues.  [phone]: Please page night hospitalist for any issues.     Camelia Jones  :  1934(89 y.o.)  MRN:  [id number]  PCP: Trav Rodriguez MD  LOS: 9  day(s) since admission    Assessment & Plan  Hypernatremia      Assessment and Plan:     Camelia Jones is a 89 y.o. female from ECF with PMH HTN, HLD, chronic anemia, CKD IV, chronic LE edema, pancreatitis, RLE DVT with post thrombotic syndrome, hx sacral ala / L5 fx, chronic hyponatremia.     Patient presented from ECF with several days of worsening mentation.  Associated with poor p.o. intake.  Patient was recently diagnosed with UTI and pneumonia at facility initially placed on doxycycline and later transitioned to Invanz IM.  Currently on fourth day of Invanz and a total of 1 week of antibiotics prior to arrival in the ED.  Patient admitted to clear productive cough and fatigue.  Denies fever, rigor, chest pain, dyspnea, worsening lower extremity edema, dysuria, anuria.  Per family at bedside mentation has significantly improved following IVF in ED     In ED VS was significant for mild hypertension.  Labs were significant for hyponatremia (151), hyperchloremia (114), azotemia from baseline (78/3.71), elevated BNP (861), elevated troponin (42), negative leukocytosis, negative flu/RSV/COVID 19.  CT head was unrevealing for acute intracranial processes.  CT chest abdomen pelvis shows RML and RLL pneumonia, moderate L pleural effusion and trace R pleural effusion.  ECG shows no acute ST changes or malignant arrhythmia.  Patient was admitted for hypernatremia and WARREN.     # Acute metabolic encephalopathy,  Waxes and wanes  #WARREN/CKD IV  #Anorexia  #Hypernatremia, resolved  #B/L pleural effusion (L>R) w/o hypoxia  #Recent RML/RLL pneumonia PTA  #Chronic LE edema  #Hx DVT  #Hx chronic hyponatremia  #Anemia of chronic disease  -delirium protocol  -PRN IVF (D5W/bicarb) w/  caution 2/2 pleural effusion  -check MRI brain  -s/p abx   -home antihypertensive and diuretics (torsemide) 2/2 WARREN  -will consider discontinuing home amlodipine as it can contribute to LE edema  -PT/OT  -Pleural fluid is too small to safely perform Thora (2/3/2025)  -CS notes reviewed and recs appreciated: Nephrology, ID    Disposition: await test results, await consultant recommendations, await clinical improvement, and await placement    DVT Prophylaxis: Subq Heparin    Code status: DNR and No Intubation  Diet: Adult diet Regular    Level of MDM:  High    discussed with nurse, discussed with TCC/SW, patient and family updated, I personally examined the patient, and I personally reviewed chart, data, labs radiology reports    Family Communication  Number : Name of Designated Family Representative:      Total time spent: 35 minutes, of total time providing counseling or in coordination of care. Total time on this day of visit includes record and documentation review before and after visit including documentation and time not explicitly included on EMR time stamp      Subjective:   Interval History:  HPI  The patient complains of AMS  The patient feels their symptoms are slowly improving  no events or new concerns    Scheduled Meds:acetaminophen, 975 mg, oral, q8h  amLODIPine, 5 mg, oral, Daily  aspirin, 81 mg, oral, Daily  B complex-vitamin C, 1 tablet, oral, Daily  benzonatate, 200 mg, oral, TID  carvedilol, 12.5 mg, oral, BID  cetirizine, 5 mg, oral, Daily  [Held by provider] ferrous sulfate (325 mg ferrous sulfate), 65 mg of iron, oral, Daily  gabapentin, 100 mg, oral, Nightly  heparin (porcine), 5,000 Units, subcutaneous, q8h LATA  lidocaine, 1 patch, transdermal, Daily  [Held by provider] polyethylene glycol, 17 g, oral, BID  pravastatin, 20 mg, oral, Daily  [Held by provider] sennosides-docusate sodium, 2 tablet, oral, BID  sodium bicarbonate, 650 mg, oral, BID  [Held by provider] torsemide, 10 mg, oral,  Daily      Continuous Infusions:dextrose 5%-0.2 % sod chloride 1,000 mL with sodium bicarbonate 1 mEq/mL (8.4 %) 30 mEq IV, 50 mL/m2/hr      PRN Meds:PRN medications: dextromethorphan-guaifenesin, ipratropium-albuteroL, OLANZapine **OR** OLANZapine, ondansetron, prochlorperazine    Review of Systems   Unable to perform ROS: Mental status change     Interval Pertinent History:  Social History     Tobacco Use    Smoking status: Never    Smokeless tobacco: Never   Substance Use Topics    Alcohol use: Never         Objective:   Patient Vitals for the past 24 hrs:   BP Temp Temp src Pulse Resp SpO2 Weight   25 1218 126/81 36.6 °C (97.9 °F) Tympanic 85 20 96 % --   25 0600 -- -- -- -- -- -- 56.5 kg (124 lb 9 oz)   25 0534 136/77 36.6 °C (97.9 °F) Temporal 84 16 96 % --   02/10/25 2330 -- -- -- 79 16 94 % --   02/10/25 2015 123/70 37 °C (98.6 °F) Temporal 85 16 93 % --   02/10/25 1634 101/70 36.7 °C (98.1 °F) Temporal 80 16 95 % --       Average, Min, and Max for last 24 hours Vitals:  TEMPERATURE:  Temp  Av.7 °C (98.1 °F)  Min: 36.6 °C (97.9 °F)  Max: 37 °C (98.6 °F)    RESPIRATIONS RANGE: Resp  Av.8  Min: 16  Max: 20    PULSE RANGE: Pulse  Av.6  Min: 79  Max: 85    BLOOD PRESSURE RANGE:  Systolic (24hrs), Av , Min:101 , Max:136   ; Diastolic (24hrs), Av, Min:70, Max:81      PULSE OXIMETRY RANGE: SpO2  Av.8 %  Min: 93 %  Max: 96 %  Body mass index is 22.78 kg/m².    I/O last 3 completed shifts:  In: 190 (3.4 mL/kg) [P.O.:50; I.V.:140 (2.5 mL/kg)]  Out: 430 (7.6 mL/kg) [Urine:430 (0.2 mL/kg/hr)]  Weight: 56.5 kg   Weight change: -0.9 kg (-1 lb 15.7 oz)     Physical Exam  Vitals and nursing note reviewed.   Constitutional:       Appearance: Normal appearance.   HENT:      Head: Normocephalic and atraumatic.      Right Ear: External ear normal.      Left Ear: External ear normal.      Nose: Nose normal.      Mouth/Throat:      Mouth: Mucous membranes are moist.   Eyes:       General: No scleral icterus.        Right eye: No discharge.         Left eye: No discharge.      Extraocular Movements: Extraocular movements intact.      Conjunctiva/sclera: Conjunctivae normal.      Pupils: Pupils are equal, round, and reactive to light.   Cardiovascular:      Rate and Rhythm: Regular rhythm.   Pulmonary:      Effort: Pulmonary effort is normal.      Breath sounds: No rhonchi.   Abdominal:      General: Abdomen is flat. Bowel sounds are normal.      Palpations: Abdomen is soft.   Musculoskeletal:         General: Normal range of motion.      Right lower leg: No edema.      Left lower leg: No edema.   Skin:     General: Skin is warm and dry.      Capillary Refill: Capillary refill takes less than 2 seconds.   Neurological:      General: No focal deficit present.      Mental Status: She is lethargic and confused.      Motor: No tremor (intermittent mild myoclonic juerks of upper torso).   Psychiatric:         Mood and Affect: Mood normal.         Thought Content: Thought content normal.         Judgment: Judgment normal.         Lab Results   Component Value Date     (H) 02/11/2025    K 4.3 02/11/2025     (H) 02/11/2025    CO2 17 (L) 02/11/2025    BUN 54 (H) 02/11/2025    CREATININE 3.24 (H) 02/11/2025    GLUCOSE 103 (H) 02/11/2025    CALCIUM 7.7 (L) 02/11/2025    PROT 6.8 02/03/2025    BILITOT 0.3 02/02/2025    ALKPHOS 74 02/02/2025    AST 19 02/02/2025    ALT 11 02/02/2025       Lab Results   Component Value Date    WBC 7.1 02/11/2025    HGB 10.8 (L) 02/11/2025    HCT 35.7 (L) 02/11/2025    MCV 96 02/11/2025     02/11/2025    LYMPHOPCT 8.3 02/11/2025    RBC 3.73 (L) 02/11/2025    MCH 29.0 02/11/2025    MCHC 30.3 (L) 02/11/2025    RDW 15.7 (H) 02/11/2025    CRP 5.67 (H) 02/04/2025       Lab Results   Component Value Date    TSH 1.40 02/02/2025     Lab Results   Component Value Date    LACTATE 0.9 02/03/2025     (H) 02/02/2025    INR 1.3 (H) 02/02/2025        IMAGES:  Encounter Date: 02/02/25   ECG 12 lead   Result Value    Ventricular Rate 74    Atrial Rate 76    QRS Duration 80    QT Interval 432    QTC Calculation(Bazett) 479    R Axis -30    T Axis 49    QRS Count 12    Q Onset 223    T Offset 439    QTC Fredericia 463    Narrative    Accelerated Junctional rhythm  Left axis deviation  Septal infarct , age undetermined  Abnormal ECG  When compared with ECG of 02-FEB-2025 15:59, (unconfirmed)  Junctional rhythm has replaced Sinus rhythm  Septal infarct is now Present  T wave inversion no longer evident in Lateral leads        No echocardiogram results found for the past 12 months  === 02/02/25 ===    XR CHEST 2 VIEWS    - Impression -  Small left pleural effusion.    MACRO:  None    Signed by: Nadia Malik 2/2/2025 4:54 PM  Dictation workstation:   [id number]  === 02/02/25 ===    CT CHEST ABDOMEN PELVIS WO CONTRAST    - Impression -  Chest  1.  Subtle patchy nodular airspace opacities are present in the  lingula, right middle lobe and to lesser extent left lower lobe.  Correlate with any symptoms of developing pneumonia.  2. Moderate left-sided and trace right-sided pleural effusion with  some atelectasis in the lower lobes.  3. Moderate coronary artery calcifications.    Abdomen-Pelvis  1.  Liquid stool is present throughout the colon without inflammatory  wall thickening. Correlate with any enterocolitis.  2. Scattered diverticula are present in the colon without evidence of  acute diverticulitis.  3. Fat containing right inguinal hernia.      MACRO:  None    Signed by: Rui Hayes 2/2/2025 5:53 PM  Dictation workstation:   [id number]  === 02/02/25 ===    XR CHEST 2 VIEWS    - Impression -  Small left pleural effusion.    MACRO:  None    Signed by: Nadia Malik 2/2/2025 4:54 PM  Dictation workstation:   [id number]  === 08/12/15 ===    - Impression -  There is extrahepatic biliary dilatation, with the common  duct measuring up to 9.7 mm.  No  choledocholithiasis seen, and on the  MRCP images there appears to be smooth tapering of the duct towards  the ampulla, but a degree of ampullary stenosis again is not  excluded.  There is also an area of kinking in the proximal common  duct after the cystic duct insertion, though this is felt to be  physiologic, and proximally the duct is less ectatic.    Minimal layering high density material in the gallbladder lumen may  represent bile.    Renal cysts.    Scattered prominent subcentimeter mesenteric lymph nodes are  nonspecific.    Additional results of the last 24 hours have been reviewed.    Dictated using [employer] Version 2.4  Proof read however unrecognized voice recognition errors may have occurred     Electronically signed by Bear Silveira DO on 02/11/25 at 2:12 PM

## 2025-02-11 NOTE — PROGRESS NOTES
"Physical Therapy                 Therapy Communication Note    Patient Name: Camelia Jones  MRN: [id number]  Department: [address]  Room: [address] [place] [address]  Today's Date: 2/11/2025     Discipline: Physical Therapy    PT Missed Visit: Yes     Missed Visit Reason: Missed Visit Reason: Patient refused (Daughter refused therapy today stating \"todays not a good day, doctor ordered MRI and IV fluids for worsening kidneys no tx nursing aware.)    Missed Time: Attempt    Comment: 1510  "

## 2025-02-11 NOTE — PROGRESS NOTES
Camelia Jones is a 89 y.o. female on day 9 of admission presenting with Hypernatremia.    Subjective   Interval History: no fever, no new complaints, the hx is limited        Review of Systems    Objective   Range of Vitals (last 24 hours)  Heart Rate:  [79-85]   Temp:  [36.6 °C (97.9 °F)-37 °C (98.6 °F)]   Resp:  [16]   BP: (101-136)/(70-77)   Weight:  [56.5 kg (124 lb 9 oz)]   SpO2:  [93 %-96 %]   Daily Weight  02/11/25 : 56.5 kg (124 lb 9 oz)    Body mass index is 22.78 kg/m².    Physical Exam  Constitutional:       Appearance: Normal appearance.   HENT:      Head: Normocephalic and atraumatic.      Mouth/Throat:      Mouth: Mucous membranes are moist.      Pharynx: Oropharynx is clear.   Eyes:      Pupils: Pupils are equal, round, and reactive to light.   Cardiovascular:      Rate and Rhythm: Normal rate and regular rhythm.      Heart sounds: Normal heart sounds.   Pulmonary:      Effort: Pulmonary effort is normal.      Breath sounds: Normal breath sounds.   Abdominal:      General: Abdomen is flat. Bowel sounds are normal.      Palpations: Abdomen is soft.   Musculoskeletal:      Cervical back: Normal range of motion.   Neurological:      Mental Status: She is alert.         Antibiotics  ertapenem - 1 gram    Relevant Results  Labs  Results from last 72 hours   Lab Units 02/11/25  0838 02/10/25  0642 02/09/25  0656   WBC AUTO x10*3/uL 7.1 10.2 8.4   HEMOGLOBIN g/dL 10.8* 10.8* 12.0   HEMATOCRIT % 35.7* 33.8* 37.3   PLATELETS AUTO x10*3/uL 297 258 288   NEUTROS PCT AUTO % 74.6 83.2 76.8   LYMPHS PCT AUTO % 8.3 5.2 8.6   MONOS PCT AUTO % 11.4 8.9 10.6   EOS PCT AUTO % 3.8 1.7 2.7     Results from last 72 hours   Lab Units 02/11/25  0838 02/10/25  0642 02/09/25  0656   SODIUM mmol/L 146* 144 143   POTASSIUM mmol/L 4.3 4.5 4.4   CHLORIDE mmol/L 115* 114* 111*   CO2 mmol/L 17* 15* 17*   BUN mg/dL 54* 53* 51*   CREATININE mg/dL 3.24* 2.96* 3.03*   GLUCOSE mg/dL 103* 67* 92   CALCIUM mg/dL 7.7* 7.8* 8.1*   ANION  GAP mmol/L 18 20 19   EGFR mL/min/1.73m*2 13* 15* 14*   PHOSPHORUS mg/dL 4.6 4.9 4.3     Results from last 72 hours   Lab Units 02/11/25  0838 02/10/25  0642 02/09/25  0656   ALBUMIN g/dL 2.6* 2.6* 3.0*     Estimated Creatinine Clearance: 9.1 mL/min (A) (by C-G formula based on SCr of 3.24 mg/dL (H)).  C-Reactive Protein   Date Value Ref Range Status   02/04/2025 5.67 (H) <1.00 mg/dL Final   02/03/2025 5.73 (H) <1.00 mg/dL Final   02/02/2025 4.75 (H) <1.00 mg/dL Final     Microbiology  Reviewed  Imaging  Reviewed        Assessment/Plan   Pneumonia / effusion / atelectasis, procalcitonin 0.46, MRSA screen positive, completed antibiotics  Pyuria, negative culture  Encephalopathy, likely metabolic     Recommendations :  Supportive care  Discussed with the medical team     I spent minutes in the professional and overall care of this patient.      Kostas Miller MD

## 2025-02-12 LAB — GLUCOSE BLD MANUAL STRIP-MCNC: 127 MG/DL (ref 74–99)

## 2025-02-12 PROCEDURE — 2500000005 HC RX 250 GENERAL PHARMACY W/O HCPCS: Performed by: INTERNAL MEDICINE

## 2025-02-12 PROCEDURE — 99232 SBSQ HOSP IP/OBS MODERATE 35: CPT | Performed by: INTERNAL MEDICINE

## 2025-02-12 PROCEDURE — 2500000004 HC RX 250 GENERAL PHARMACY W/ HCPCS (ALT 636 FOR OP/ED): Performed by: INTERNAL MEDICINE

## 2025-02-12 PROCEDURE — 82947 ASSAY GLUCOSE BLOOD QUANT: CPT

## 2025-02-12 PROCEDURE — 1200000002 HC GENERAL ROOM WITH TELEMETRY DAILY

## 2025-02-12 PROCEDURE — 99223 1ST HOSP IP/OBS HIGH 75: CPT

## 2025-02-12 PROCEDURE — 2500000001 HC RX 250 WO HCPCS SELF ADMINISTERED DRUGS (ALT 637 FOR MEDICARE OP): Performed by: INTERNAL MEDICINE

## 2025-02-12 PROCEDURE — 94760 N-INVAS EAR/PLS OXIMETRY 1: CPT

## 2025-02-12 RX ADMIN — CARVEDILOL 12.5 MG: 12.5 TABLET, FILM COATED ORAL at 20:26

## 2025-02-12 RX ADMIN — SODIUM BICARBONATE 650 MG: 650 TABLET ORAL at 20:27

## 2025-02-12 RX ADMIN — HEPARIN SODIUM 5000 UNITS: 5000 INJECTION INTRAVENOUS; SUBCUTANEOUS at 08:57

## 2025-02-12 RX ADMIN — BENZONATATE 200 MG: 100 CAPSULE ORAL at 20:26

## 2025-02-12 RX ADMIN — ACETAMINOPHEN 975 MG: 325 TABLET, FILM COATED ORAL at 20:30

## 2025-02-12 RX ADMIN — PRAVASTATIN SODIUM 20 MG: 40 TABLET ORAL at 20:26

## 2025-02-12 RX ADMIN — SODIUM BICARBONATE 75 ML/HR: 84 INJECTION INTRAVENOUS at 13:03

## 2025-02-12 ASSESSMENT — PAIN SCALES - GENERAL
PAINLEVEL_OUTOF10: 2
PAINLEVEL_OUTOF10: 0 - NO PAIN

## 2025-02-12 ASSESSMENT — COGNITIVE AND FUNCTIONAL STATUS - GENERAL
WALKING IN HOSPITAL ROOM: TOTAL
PERSONAL GROOMING: TOTAL
TURNING FROM BACK TO SIDE WHILE IN FLAT BAD: A LOT
MOVING TO AND FROM BED TO CHAIR: TOTAL
HELP NEEDED FOR BATHING: TOTAL
DAILY ACTIVITIY SCORE: 7
MOVING TO AND FROM BED TO CHAIR: TOTAL
WALKING IN HOSPITAL ROOM: TOTAL
HELP NEEDED FOR BATHING: TOTAL
CLIMB 3 TO 5 STEPS WITH RAILING: TOTAL
CLIMB 3 TO 5 STEPS WITH RAILING: TOTAL
EATING MEALS: A LOT
PERSONAL GROOMING: TOTAL
DRESSING REGULAR LOWER BODY CLOTHING: TOTAL
MOBILITY SCORE: 8
STANDING UP FROM CHAIR USING ARMS: TOTAL
TOILETING: TOTAL
TOILETING: TOTAL
MOBILITY SCORE: 7
DRESSING REGULAR LOWER BODY CLOTHING: TOTAL
MOVING FROM LYING ON BACK TO SITTING ON SIDE OF FLAT BED WITH BEDRAILS: A LOT
DAILY ACTIVITIY SCORE: 8
EATING MEALS: A LOT
MOVING FROM LYING ON BACK TO SITTING ON SIDE OF FLAT BED WITH BEDRAILS: A LOT
TURNING FROM BACK TO SIDE WHILE IN FLAT BAD: TOTAL
STANDING UP FROM CHAIR USING ARMS: TOTAL
DRESSING REGULAR UPPER BODY CLOTHING: TOTAL
DRESSING REGULAR UPPER BODY CLOTHING: A LOT

## 2025-02-12 ASSESSMENT — PAIN - FUNCTIONAL ASSESSMENT: PAIN_FUNCTIONAL_ASSESSMENT: 0-10

## 2025-02-12 NOTE — CONSULTS
PALLIATIVE MEDICINE CONSULT   Attending Provider: Bear Silveira DO   Reason For Consult: Assistance with goals of care, complex medical decision making, hospice discussion     INTRODUCTION TO PALLIATIVE MEDICINE    Met with patient and her daughter/CHIDI Sharp at bedside.   Patient's Capacity: Patient remains altered, does not have capacity to make their own medical decisions at this time. Unable to participate in ROS or goals of care discussion. Surrogate decision maker is her daughter Lila.  Service: Palliative Medicine was introduced as a specialty service for patients with serious illness to help with symptom management, improve quality of life, assist with goals of care conversations, navigate complex decision making, and provide support to patients and families. Support and empathy was provided throughout the encounter.     SUBJECTIVE    History Of Present Illness  Camelia Jones is a 89 y.o. female with past medical history of HTN, HLD, chronic anemia, CKD IV, chronic LE edema, pancreatitis, RLE DVT with post thrombotic syndrome, sacral ala/L5 fracture (2023), and chronic hyponatremia. Patient presented to the ED on 2/2/25 from her LTC facility with a chief complaint of altered mental status. Patient was being treated for pneumonia and UTI (total of one week of antibiotics, day four of Ivanz) and was not showing any clinical improvement prompting her daughter to request she be sent to the hospital. Patient was admitted for further evaluation and management of WARREN on CKD IV, acute metabolic encephalopathy, hypernatremia, recent right lobe pneumonia, bilateral pleural effusions, and recent UTI. Patient completed treatment for both pneumonia and UTI while here. Urine culture negative. Other acute sources of metabolic encephalopathy ruled out. MRI completed 2/11/25 that was negative for acute process, revealed chronic microvascular ischemic changes and advanced volume loss. Patient has had poor oral  intake during admission and worsening WARREN on CKD. Patient was started on D5 bicarb infusion. Palliative Medicine was consulted for goals of care and complex medical decision making.     History obtained from chart review including ED note, H&P, patient's daily progress notes, review of lab/test results, and discussion with primary team and bedside RN.    Allergies  Azithromycin, Ciprofloxacin, Codeine, Macrolide antibiotics, Oxycodone, Penicillins, Sulfa (sulfonamide antibiotics), and Tramadol    Past Medical History  She has a past medical history of Other conditions influencing health status, Other constipation, Pain in unspecified hip, Personal history of other diseases of the circulatory system, Personal history of other diseases of the respiratory system, Personal history of other endocrine, nutritional and metabolic disease, Personal history of other medical treatment (11/14/2019), and Personal history of other mental and behavioral disorders.     Surgical History  She has a past surgical history that includes Other surgical history (05/07/2013); Appendectomy (05/07/2013); Total abdominal hysterectomy (05/07/2013); Other surgical history (05/07/2013); and Cholecystectomy (11/30/2015).    Family History  No family history on file.     Social History  She reports that she has never smoked. She has never used smokeless tobacco. She reports that she does not drink alcohol and does not use drugs.     ADVANCE CARE PLANNING: Patient and/or family consented to a voluntary advance care planning meeting    Advance Care Planning    Serious Illness Assessment:    Perception: Patient's daughter and HPOA Lila     Life Limiting Disease:  Anorexia and protein calorie malnutrition resulting electrolyte disturbances, dehydration, and worsening WARREN on CKD/worsening of CKD posing threat to life or function. Baseline creatine around 1.9-2.0. Was 2.9 during admission, increased to 3.24 yesterday (patient refused labs today) as  patient has had limited clinical improvement during ten day hospital admission despite completing treatment for acute issues including pneumonia and UTI, MRI head 2/11/25 negative for acute issues, revealed microvascular ischemic changes and advanced volume loss; acute metabolic encephalopathy superimposed on underlying dementia posing threat to life or function; patient requiring D5 bicarb infusion in setting of all of this, completed advance directives in 2016 and signed initials that she would not want IV hydration or artifical nutrition in setting of terminal decline      Disease Specific Counseling/Prognosis Discussed: Counseling provided on patient's current clinical condition, including diagnoses (see above), prognosis, and management plan.     Impression of Disease and Stage: Lila expressing understanding of overall health status and severity of illness. Aware that despite ten day hospital stay and treatment of acute issues patient's continued to have very poor oral intake requiring IV hydration and that her kidney function has worsened.     Goals/Hopes: Discussion ensued about goals for patient's medical care going forward. Actively listened to Lila sharing her perspective on patient's current quality of life, disease course/progression, and symptom and treatment burden. Lila expressing that patient maintained an excellent quality of life prior to admission. Loves staff at [facility], loves her roommate and watches out for her, participates in daily activities including chair yoga, enjoys reading and cross word puzzles, staff treats her like family, and she and family visit about 6 days a week. Lila takes patient out for activities very frequently, takes her shopping with her, family had a big 89th birthday part for her in August. Lila endorsing difficulty in accepting acuteness of patient's decline but acknowledges progression of underlying dementia (Per her report, patient has been  "\"sundowning nightly\") and that patient will continue to decline and keep needing to return to hospital if she goes back to [facility] and does not eat or drink enough to sustain. She would like for IV fluids to be stopped and for patient to discharge back to [facility] and is hopeful that when she returns to her home she can improve to her previous health and functional baseline but reports if this does not have and patient continues to decline she will honor her mother's wishes and ask nursing facility to bring in hospice.     Hospice Discussion: Counseling provided on the benefit of Hospice Services if goals change to comfort or if patient continues to decline at nursing facility to support patient and family by providing counseling, symptom management, prioritizing comfort and quality of life, and alleviation of suffering. Lila reports that she is familiar with hospice and will honor her moms wishes for comfort if she continues to decline after discharge.     Resuscitation Assessment: Patient is a DNR/DNI. State DNR form on file.     Advance Directives: On file     All questions and concerns were addressed during encounter.   -------------------------------------------------------------------------------    Medication History   Current Outpatient Medications   Medication Instructions    acetaminophen (TYLENOL 8 HOUR) 650 mg, oral, Daily, Do not crush, chew, or split. Takes at 8am    acetaminophen (TYLENOL) 500 mg, oral, 2 times daily    albuterol 2.5 mg, nebulization, Every 8 hours PRN    amLODIPine (Norvasc) 5 mg tablet Daily RT    aspirin 81 mg, Daily    benzonatate (TESSALON) 100 mg, oral, 3 times daily, Do not crush or chew.    biotin 1 mg    calcium carbonate (OSCAL) 250 mg    carvedilol (COREG) 12.5 mg, oral, 2 times daily    cetirizine (ZYRTEC) 10 mg, Daily    cyanocobalamin (VITAMIN B-12) 1 g, intramuscular, Every 14 days    dextromethorphan-guaifenesin (Mucinex DM)  mg 12 hr tablet 1 tablet, " "Every 12 hours    docusate sodium (COLACE) 100 mg, 2 times daily    ertapenem (INVANZ) 1 g, intravenous, Daily, For 5 days    gabapentin (NEURONTIN) 100 mg, Nightly    KRILL OIL ORAL 1,000 mg    lidocaine (Lidoderm) 5 % patch 1 patch, transdermal, Daily, Remove & discard patch within 12 hours or as directed by MD.    omega-3 acid ethyl esters (LOVAZA) 1 g, Daily    ondansetron (ZOFRAN) 4 mg, oral, Every 6 hours PRN    polyethylene glycol (Miralax) 17 gram/dose powder as directed Orally    pravastatin (PRAVACHOL) 20 mg, Daily RT    sennosides (Senokot) 8.6 mg tablet 1 tablet, 2 times daily    sodium bicarbonate 650 mg, oral, 2 times daily    torsemide (DEMADEX) 10 mg, oral, Daily    TURMERIC ORAL 1,000 mg, oral, 2 times daily    vitamin E 400 Units, oral, Daily    zinc gluconate 50 mg tablet 1 (one) time each day at the same time.     Scheduled medications   acetaminophen, 975 mg, oral, q8h  amLODIPine, 5 mg, oral, Daily  aspirin, 81 mg, oral, Daily  B complex-vitamin C, 1 tablet, oral, Daily  benzonatate, 200 mg, oral, TID  carvedilol, 12.5 mg, oral, BID  cetirizine, 5 mg, oral, Daily  [Held by provider] ferrous sulfate (325 mg ferrous sulfate), 65 mg of iron, oral, Daily  [Held by provider] gabapentin, 100 mg, oral, Nightly  heparin (porcine), 5,000 Units, subcutaneous, q8h LATA  lidocaine, 1 patch, transdermal, Daily  [Held by provider] polyethylene glycol, 17 g, oral, BID  pravastatin, 20 mg, oral, Daily  [Held by provider] sennosides-docusate sodium, 2 tablet, oral, BID  sodium bicarbonate, 650 mg, oral, BID  [Held by provider] torsemide, 10 mg, oral, Daily      Continuous medications     PRN medications  PRN medications: dextromethorphan-guaifenesin, ipratropium-albuteroL, OLANZapine **OR** OLANZapine, ondansetron, prochlorperazine     Last Recorded Vitals  Blood pressure (!) 139/93, pulse 89, temperature 36.6 °C (97.9 °F), temperature source Temporal, resp. rate 18, height 1.575 m (5' 2.01\"), weight 54.4 kg " (119 lb 14.9 oz), SpO2 97%.  7 Day Weight Change: -0.782 kg (-1 lb 11.6 oz) per day   Body mass index is 21.93 kg/m².     Relevant Results  Lab Results   Component Value Date    WBC 7.1 02/11/2025    HGB 10.8 (L) 02/11/2025    HCT 35.7 (L) 02/11/2025    MCV 96 02/11/2025     02/11/2025      Lab Results   Component Value Date    GLUCOSE 103 (H) 02/11/2025    CALCIUM 7.7 (L) 02/11/2025     (H) 02/11/2025    K 4.3 02/11/2025    CO2 17 (L) 02/11/2025     (H) 02/11/2025    BUN 54 (H) 02/11/2025    CREATININE 3.24 (H) 02/11/2025      Lab Results   Component Value Date    ALT 11 02/02/2025    AST 19 02/02/2025    ALKPHOS 74 02/02/2025    BILITOT 0.3 02/02/2025      Lab Results   Component Value Date    ALBUMIN 2.6 (L) 02/11/2025     Serum creatinine: 3.24 mg/dL (H) 02/11/25 0838  Estimated creatinine clearance: 9.1 mL/min (A)     Relevant Imaging  MR brain wo IV contrast  Interpreted By:  Nathan Do,   STUDY:  MR BRAIN WO IV CONTRAST;  2/11/2025 7:05 pm      INDICATION:  Signs/Symptoms:stroke.      COMPARISON:  None.      ACCESSION NUMBER(S):  [id number]      ORDERING CLINICIAN:  PRATIK ROWLEY      TECHNIQUE:  The brain was studied in the sagittal axial and coronal planes  utilizing diffusion, gradient echo T2 weighted FLAIR, T1 and T2  weighted images. The examination is limited due to motion artifact.      FINDINGS:  There is mild/moderate prominence of the cortical sulci and sylvian  fissures. There is moderate/advanced ventricular dilatation.  There  are scattered and confluence foci of abnormal signal within the  periventricular and subcortical white matter bilaterally.  These are  compatible with minimal small vessel ischemic changes.  These  nonspecific findings could also be produced by a demyelinating or  post inflammatory process.  The visualized skull base paranasal  sinuses and orbital structures are unremarkable. Diffusion weighted  images and associated ADC maps of the brain were  unremarkable.  There  is no evidence of diffusion restriction to suggest the presence of  acute infarction. Gradient echo T2 weighted images demonstrate a  subcentimeter focus of signal void in the right sylvian fissure that  can not be further characterized and was not identified on previous  CT. Consider a vascular structure. Right middle cerebral artery  aneurysm not excluded. Suggest MRA..      IMPRESSION  * There is no evidence of mass, cerebral infarction or hemorrhage.  *Moderate/advanced volume loss without interstitial edema  *Nonspecific focus of hemosiderin deposition or mineralization in the  right sylvian fissure. Right middle cerebral artery aneurysm not  excluded. Suggest CTA/MRA          MACRO:  Critical Finding:  See findings. Notification was initiated on  2/12/2025 at 11:03 am by  Nathan Do.  (**-OCF-**)      Signed by: Nathan Do 2/12/2025 11:03 AM  Dictation workstation:   [id number]     Diagnostics Review With Patient or Family  Yes    Physical Exam:   Physical Exam  Constitutional:       General: She is not in acute distress.     Appearance: She is ill-appearing.   HENT:      Head: Normocephalic and atraumatic.      Mouth/Throat:      Mouth: Mucous membranes are dry.   Pulmonary:      Effort: Pulmonary effort is normal.   Neurological:      Mental Status: She is alert. She is disoriented.      Comments: Patient able to participate in portions of visit, could accurately describe her living situation at [facility], but intermittently made statements or comments that demonstrated continued confusion and encephalopathy         ASSESSMENT AND PLAN    - Continue supportive care through primary team    #Goals of Care:  #Complex Medical Decision Making:  #Advance Care Planning:  - goals are survival and time based: Patient's daughter and CHIDI Sharp would like for patient to discharge back to [facility] in hopes that patient can return to her previous cognitive and functional  baseline  - Lila acknowledging patient's guarded prognosis with concern for advancing underlying dementia, poor oral intake resulting in electrolyte disturbances and worsening kidney function requiring D5 bicarb infusion, and patient's high risk of continued decompensation after discharge if she continues to have poor oral intake resulting in repeated hospital admissions: in setting of this, Lila reports that if patient returns to [facility] and continues to decline she will ask staff there to assist her in consulting hospice as she would honor her moms wishes for comfort and to not keep coming back and forth to the hospital   - code status DNR/DNI  - state DNR form on file   - advance directives on file     Supportive Interventions: Music Therapy: referral placed     Plan of Care discussed with: Updated MAX Marcano, and bedside RN on goals of care discussion via epic secure chat and face-to-face encounter.     Thank you for asking Palliative Care to assist with care of this patient.  We will continue to follow  Please contact us for additional questions or concerns.    Signature and billing  I spent 90 minutes in the care of this patient which included extensive chart review, interviewing patient/family, discussion with primary team and bedside RN, coordination of care, and high risk management/treatment.     SIGNATURE: TOSHIA [place]-CNP  PAGER/CONTACT:  Contact information:  Palliative Medicine  Contact Via Epic Secure chat

## 2025-02-12 NOTE — NURSING NOTE
Patient is refusing most medications and being noncompliant with care.  She is being very aggressive, yelling and moving around to the point where we can't get accurate vital signs. Due to her mental status she is unable to follow commands or understand the importance of her care.

## 2025-02-12 NOTE — PROGRESS NOTES
[facility] Hospitalist Progress Note       [phone]: Please page me for patient care issues.  [phone]: Please page night hospitalist for any issues.     Camelia Jones  :  1934(89 y.o.)  MRN:  [id number]  PCP: Trav Rodriguez MD  LOS: 10  day(s) since admission    Assessment & Plan  Hypernatremia      Assessment and Plan:     Camelia Jones is a 89 y.o. female from ECF with PMH HTN, HLD, chronic anemia, CKD IV, chronic LE edema, pancreatitis, RLE DVT with post thrombotic syndrome, hx sacral ala / L5 fx, chronic hyponatremia.     Patient presented from ECF with several days of worsening mentation.  Associated with poor p.o. intake.  Patient was recently diagnosed with UTI and pneumonia at facility initially placed on doxycycline and later transitioned to Invanz IM.  Currently on fourth day of Invanz and a total of 1 week of antibiotics prior to arrival in the ED.  Patient admitted to clear productive cough and fatigue.  Denies fever, rigor, chest pain, dyspnea, worsening lower extremity edema, dysuria, anuria.  Per family at bedside mentation has significantly improved following IVF in ED     In ED VS was significant for mild hypertension.  Labs were significant for hyponatremia (151), hyperchloremia (114), azotemia from baseline (78/3.71), elevated BNP (861), elevated troponin (42), negative leukocytosis, negative flu/RSV/COVID 19.  CT head was unrevealing for acute intracranial processes.  CT chest abdomen pelvis shows RML and RLL pneumonia, moderate L pleural effusion and trace R pleural effusion.  ECG shows no acute ST changes or malignant arrhythmia.  Patient was admitted for hypernatremia and WARREN.     # Acute metabolic encephalopathy,  Waxes and wanes improving overall  #WARREN/CKD IV  #Anorexia, waxes and wanes  #Hypernatremia, resolved  #B/L pleural effusion (L>R) w/o hypoxia  #Recent RML/RLL pneumonia PTA  #Chronic LE edema  #Hx DVT  #Hx chronic hyponatremia  #Anemia of chronic  disease  -delirium protocol  -DC IVF. Family made aware that hospice is the next best step if anorexia persist w/ associated complications ie electrolyte disturbance and/or WARREN  -MRI brain w/o acute findings  -s/p abx   -home antihypertensive and diuretics (torsemide) 2/2 WARREN  -will consider discontinuing home amlodipine as it can contribute to LE edema  -PT/OT  -Pleural fluid is too small to safely perform Thora (2/3/2025)  -CS notes reviewed and recs appreciated: Nephrology, ID    Disposition: await placement and anticipate discharge 2/13/25    DVT Prophylaxis: Subq Heparin    Code status: DNR and No Intubation  Diet: Adult diet Regular    Level of MDM:  High    discussed with nurse, discussed with TCC/SW, patient and family updated, I personally examined the patient, and I personally reviewed chart, data, labs radiology reports    Family Communication  Number : Name of Designated Family Representative:      Total time spent: 35 minutes, of total time providing counseling or in coordination of care. Total time on this day of visit includes record and documentation review before and after visit including documentation and time not explicitly included on EMR time stamp      Subjective:   Interval History:  HPI  The patient complains of AMS  The patient feels their symptoms are slowly improving  no events or new concerns    Scheduled Meds:acetaminophen, 975 mg, oral, q8h  amLODIPine, 5 mg, oral, Daily  aspirin, 81 mg, oral, Daily  B complex-vitamin C, 1 tablet, oral, Daily  benzonatate, 200 mg, oral, TID  carvedilol, 12.5 mg, oral, BID  cetirizine, 5 mg, oral, Daily  [Held by provider] ferrous sulfate (325 mg ferrous sulfate), 65 mg of iron, oral, Daily  [Held by provider] gabapentin, 100 mg, oral, Nightly  heparin (porcine), 5,000 Units, subcutaneous, q8h LATA  lidocaine, 1 patch, transdermal, Daily  [Held by provider] polyethylene glycol, 17 g, oral, BID  pravastatin, 20 mg, oral, Daily  [Held by provider]  sennosides-docusate sodium, 2 tablet, oral, BID  sodium bicarbonate, 650 mg, oral, BID  [Held by provider] torsemide, 10 mg, oral, Daily      Continuous Infusions:sodium bicarbonate 1 mEq/mL (8.4 %) 150 mEq in dextrose 5% 1,150 mL infusion, 75 mL/hr, Last Rate: 75 mL/hr (25 1303)      PRN Meds:PRN medications: dextromethorphan-guaifenesin, ipratropium-albuteroL, OLANZapine **OR** OLANZapine, ondansetron, prochlorperazine    Review of Systems   All other systems reviewed and are negative.    Interval Pertinent History:  Social History     Tobacco Use    Smoking status: Never    Smokeless tobacco: Never   Substance Use Topics    Alcohol use: Never         Objective:   Patient Vitals for the past 24 hrs:   BP Temp Temp src Pulse Resp SpO2 Weight   25 0742 (!) 139/93 36.6 °C (97.9 °F) Temporal -- 18 -- --   25 0614 (!) 162/106 36.7 °C (98.1 °F) Temporal 89 20 -- --   25 0552 -- -- -- -- -- -- 54.4 kg (119 lb 14.9 oz)   251 -- -- -- -- -- 97 % --   25 -- -- Temporal -- 20 -- --       Average, Min, and Max for last 24 hours Vitals:  TEMPERATURE:  Temp  Av.7 °C (98 °F)  Min: 36.6 °C (97.9 °F)  Max: 36.7 °C (98.1 °F)    RESPIRATIONS RANGE: Resp  Av.3  Min: 18  Max: 20    PULSE RANGE: Pulse  Av  Min: 89  Max: 89    BLOOD PRESSURE RANGE:  Systolic (24hrs), Av , Min:139 , Max:162   ; Diastolic (24hrs), Av, Min:93, Max:106      PULSE OXIMETRY RANGE: SpO2  Av %  Min: 97 %  Max: 97 %  Body mass index is 21.93 kg/m².    I/O last 3 completed shifts:  In: 1680 (30.9 mL/kg) [P.O.:480; I.V.:1200 (22.1 mL/kg)]  Out: 330 (6.1 mL/kg) [Urine:330 (0.2 mL/kg/hr)]  Weight: 54.4 kg   Weight change: -2.1 kg (-4 lb 10.1 oz)     Physical Exam  Vitals and nursing note reviewed.   Constitutional:       Appearance: Normal appearance.   HENT:      Head: Normocephalic and atraumatic.      Right Ear: External ear normal.      Left Ear: External ear normal.      Nose: Nose  normal.      Mouth/Throat:      Mouth: Mucous membranes are moist.   Eyes:      General: No scleral icterus.        Right eye: No discharge.         Left eye: No discharge.      Extraocular Movements: Extraocular movements intact.      Conjunctiva/sclera: Conjunctivae normal.      Pupils: Pupils are equal, round, and reactive to light.   Cardiovascular:      Rate and Rhythm: Regular rhythm.   Pulmonary:      Effort: Pulmonary effort is normal.      Breath sounds: No rhonchi.   Abdominal:      General: Abdomen is flat. Bowel sounds are normal.      Palpations: Abdomen is soft.   Musculoskeletal:         General: Normal range of motion.      Right lower leg: No edema.      Left lower leg: No edema.   Skin:     General: Skin is warm and dry.      Capillary Refill: Capillary refill takes less than 2 seconds.   Neurological:      General: No focal deficit present.      Mental Status: She is alert and oriented to person, place, and time. Mental status is at baseline.      Motor: No tremor (intermittent mild myoclonic juerks of upper torso).   Psychiatric:         Attention and Perception: She is attentive. She does not perceive auditory hallucinations.         Mood and Affect: Mood normal.         Speech: Speech normal.         Behavior: Behavior is not agitated. Behavior is cooperative.         Thought Content: Thought content normal.         Cognition and Memory: Cognition is not impaired. Memory is not impaired.         Judgment: Judgment normal.         Lab Results   Component Value Date     (H) 02/11/2025    K 4.3 02/11/2025     (H) 02/11/2025    CO2 17 (L) 02/11/2025    BUN 54 (H) 02/11/2025    CREATININE 3.24 (H) 02/11/2025    GLUCOSE 103 (H) 02/11/2025    CALCIUM 7.7 (L) 02/11/2025    PROT 6.8 02/03/2025    BILITOT 0.3 02/02/2025    ALKPHOS 74 02/02/2025    AST 19 02/02/2025    ALT 11 02/02/2025       Lab Results   Component Value Date    WBC 7.1 02/11/2025    HGB 10.8 (L) 02/11/2025    HCT 35.7 (L)  02/11/2025    MCV 96 02/11/2025     02/11/2025    LYMPHOPCT 8.3 02/11/2025    RBC 3.73 (L) 02/11/2025    MCH 29.0 02/11/2025    MCHC 30.3 (L) 02/11/2025    RDW 15.7 (H) 02/11/2025    CRP 5.67 (H) 02/04/2025       Lab Results   Component Value Date    TSH 1.40 02/02/2025     Lab Results   Component Value Date    LACTATE 0.9 02/03/2025     (H) 02/02/2025    INR 1.3 (H) 02/02/2025       IMAGES:  Encounter Date: 02/02/25   ECG 12 lead   Result Value    Ventricular Rate 74    Atrial Rate 76    QRS Duration 80    QT Interval 432    QTC Calculation(Bazett) 479    R Axis -30    T Axis 49    QRS Count 12    Q Onset 223    T Offset 439    QTC Fredericia 463    Narrative    Accelerated Junctional rhythm  Left axis deviation  Septal infarct , age undetermined  Abnormal ECG  When compared with ECG of 02-FEB-2025 15:59, (unconfirmed)  Junctional rhythm has replaced Sinus rhythm  Septal infarct is now Present  T wave inversion no longer evident in Lateral leads        No echocardiogram results found for the past 12 months  === 02/02/25 ===    XR CHEST 2 VIEWS    - Impression -  Small left pleural effusion.    MACRO:  None    Signed by: Nadia Malik 2/2/2025 4:54 PM  Dictation workstation:   [id number]  === 02/02/25 ===    CT CHEST ABDOMEN PELVIS WO CONTRAST    - Impression -  Chest  1.  Subtle patchy nodular airspace opacities are present in the  lingula, right middle lobe and to lesser extent left lower lobe.  Correlate with any symptoms of developing pneumonia.  2. Moderate left-sided and trace right-sided pleural effusion with  some atelectasis in the lower lobes.  3. Moderate coronary artery calcifications.    Abdomen-Pelvis  1.  Liquid stool is present throughout the colon without inflammatory  wall thickening. Correlate with any enterocolitis.  2. Scattered diverticula are present in the colon without evidence of  acute diverticulitis.  3. Fat containing right inguinal hernia.      MACRO:  None    Signed  by: Rui Hayes 2/2/2025 5:53 PM  Dictation workstation:   [id number]  === 02/02/25 ===    XR CHEST 2 VIEWS    - Impression -  Small left pleural effusion.    MACRO:  None    Signed by: Nadia Malik 2/2/2025 4:54 PM  Dictation workstation:   [id number]  === 08/12/15 ===    - Impression -  There is extrahepatic biliary dilatation, with the common  duct measuring up to 9.7 mm.  No choledocholithiasis seen, and on the  MRCP images there appears to be smooth tapering of the duct towards  the ampulla, but a degree of ampullary stenosis again is not  excluded.  There is also an area of kinking in the proximal common  duct after the cystic duct insertion, though this is felt to be  physiologic, and proximally the duct is less ectatic.    Minimal layering high density material in the gallbladder lumen may  represent bile.    Renal cysts.    Scattered prominent subcentimeter mesenteric lymph nodes are  nonspecific.    Additional results of the last 24 hours have been reviewed.    Dictated using Lindsey Shell Version 2.4  Proof read however unrecognized voice recognition errors may have occurred     Electronically signed by Bear Silveira DO on 02/12/25 at 2:11 PM

## 2025-02-12 NOTE — PROGRESS NOTES
Camelia Jones is a 89 y.o. female on day 10 of admission presenting with Hypernatremia.    Subjective   Interval History: no fever, the hx is limited        Review of Systems    Objective   Range of Vitals (last 24 hours)  Heart Rate:  [85-89]   Temp:  [36.6 °C (97.9 °F)-36.7 °C (98.1 °F)]   Resp:  [18-20]   BP: (126-162)/()   Weight:  [54.4 kg (119 lb 14.9 oz)]   SpO2:  [96 %-97 %]   Daily Weight  02/12/25 : 54.4 kg (119 lb 14.9 oz)    Body mass index is 21.93 kg/m².    Physical Exam  Constitutional:       Appearance: Normal appearance.   HENT:      Head: Normocephalic and atraumatic.      Mouth/Throat:      Mouth: Mucous membranes are moist.      Pharynx: Oropharynx is clear.   Eyes:      Pupils: Pupils are equal, round, and reactive to light.   Cardiovascular:      Rate and Rhythm: Normal rate and regular rhythm.      Heart sounds: Normal heart sounds.   Pulmonary:      Effort: Pulmonary effort is normal.      Breath sounds: Normal breath sounds.   Abdominal:      General: Abdomen is flat. Bowel sounds are normal.      Palpations: Abdomen is soft.   Musculoskeletal:      Cervical back: Normal range of motion.         Antibiotics  ertapenem - 1 gram    Relevant Results  Labs  Results from last 72 hours   Lab Units 02/11/25  0838 02/10/25  0642   WBC AUTO x10*3/uL 7.1 10.2   HEMOGLOBIN g/dL 10.8* 10.8*   HEMATOCRIT % 35.7* 33.8*   PLATELETS AUTO x10*3/uL 297 258   NEUTROS PCT AUTO % 74.6 83.2   LYMPHS PCT AUTO % 8.3 5.2   MONOS PCT AUTO % 11.4 8.9   EOS PCT AUTO % 3.8 1.7     Results from last 72 hours   Lab Units 02/11/25  0838 02/10/25  0642   SODIUM mmol/L 146* 144   POTASSIUM mmol/L 4.3 4.5   CHLORIDE mmol/L 115* 114*   CO2 mmol/L 17* 15*   BUN mg/dL 54* 53*   CREATININE mg/dL 3.24* 2.96*   GLUCOSE mg/dL 103* 67*   CALCIUM mg/dL 7.7* 7.8*   ANION GAP mmol/L 18 20   EGFR mL/min/1.73m*2 13* 15*   PHOSPHORUS mg/dL 4.6 4.9     Results from last 72 hours   Lab Units 02/11/25  0838 02/10/25  0642   ALBUMIN  g/dL 2.6* 2.6*     Estimated Creatinine Clearance: 9.1 mL/min (A) (by C-G formula based on SCr of 3.24 mg/dL (H)).  C-Reactive Protein   Date Value Ref Range Status   02/04/2025 5.67 (H) <1.00 mg/dL Final   02/03/2025 5.73 (H) <1.00 mg/dL Final   02/02/2025 4.75 (H) <1.00 mg/dL Final     Microbiology  Reviewed  Imaging  Reviewed        Assessment/Plan   Pneumonia / effusion / atelectasis, procalcitonin 0.46, MRSA screen positive, completed antibiotics  Pyuria, negative culture  Encephalopathy, likely metabolic, fluctuating     Recommendations :  Supportive care  Discussed with the medical team     I spent minutes in the professional and overall care of this patient.      Kostas Miller MD

## 2025-02-12 NOTE — CARE PLAN
The patient's goals for the shift include  can't assess    The clinical goals for the shift include Patient to increasePO intake this shift

## 2025-02-12 NOTE — PROGRESS NOTES
02/12/25 0909   Discharge Planning   Living Arrangements Other (Comment)  (from [facility])   Support Systems Children   Assistance Needed Currently patient not answering quesions appropriately-baseline A&Ox3-4;assist for ADLs and self propels in wheelchair; room air baseline and currently room air   Type of Residence Nursing home/residential care   Do you have animals or pets at home? No   Home or Post Acute Services Post acute facilities (Rehab/SNF/etc)   Type of Post Acute Facility Services Long term care   Expected Discharge Disposition Inter  (Spoke with pt's dtr at the bedside, preference for pt to return to [facility] when medically ready. No precert)   Does the patient need discharge transport arranged? Yes   RoundTrip coordination needed? Yes   Has discharge transport been arranged? No

## 2025-02-13 ENCOUNTER — APPOINTMENT (OUTPATIENT)
Dept: INFUSION THERAPY | Facility: CLINIC | Age: OVER 89
End: 2025-02-13
Payer: COMMERCIAL

## 2025-02-13 ENCOUNTER — APPOINTMENT (OUTPATIENT)
Dept: CARDIOLOGY | Facility: HOSPITAL | Age: OVER 89
End: 2025-02-13
Payer: COMMERCIAL

## 2025-02-13 LAB
ALBUMIN SERPL BCP-MCNC: 2.5 G/DL (ref 3.4–5)
ANION GAP SERPL CALC-SCNC: 15 MMOL/L (ref 10–20)
ATRIAL RATE: 108 BPM
BASOPHILS # BLD AUTO: 0.05 X10*3/UL (ref 0–0.1)
BASOPHILS NFR BLD AUTO: 0.9 %
BUN SERPL-MCNC: 56 MG/DL (ref 6–23)
CALCIUM SERPL-MCNC: 7.6 MG/DL (ref 8.6–10.3)
CHLORIDE SERPL-SCNC: 113 MMOL/L (ref 98–107)
CO2 SERPL-SCNC: 23 MMOL/L (ref 21–32)
CREAT SERPL-MCNC: 3.24 MG/DL (ref 0.5–1.05)
EGFRCR SERPLBLD CKD-EPI 2021: 13 ML/MIN/1.73M*2
EOSINOPHIL # BLD AUTO: 0.31 X10*3/UL (ref 0–0.4)
EOSINOPHIL NFR BLD AUTO: 5.7 %
ERYTHROCYTE [DISTWIDTH] IN BLOOD BY AUTOMATED COUNT: 15.8 % (ref 11.5–14.5)
GLUCOSE SERPL-MCNC: 109 MG/DL (ref 74–99)
HCT VFR BLD AUTO: 34.9 % (ref 36–46)
HGB BLD-MCNC: 10.8 G/DL (ref 12–16)
IMM GRANULOCYTES # BLD AUTO: 0.1 X10*3/UL (ref 0–0.5)
IMM GRANULOCYTES NFR BLD AUTO: 1.8 % (ref 0–0.9)
LYMPHOCYTES # BLD AUTO: 0.88 X10*3/UL (ref 0.8–3)
LYMPHOCYTES NFR BLD AUTO: 16.1 %
MAGNESIUM SERPL-MCNC: 2.05 MG/DL (ref 1.6–2.4)
MCH RBC QN AUTO: 29.2 PG (ref 26–34)
MCHC RBC AUTO-ENTMCNC: 30.9 G/DL (ref 32–36)
MCV RBC AUTO: 94 FL (ref 80–100)
MONOCYTES # BLD AUTO: 0.83 X10*3/UL (ref 0.05–0.8)
MONOCYTES NFR BLD AUTO: 15.2 %
NEUTROPHILS # BLD AUTO: 3.29 X10*3/UL (ref 1.6–5.5)
NEUTROPHILS NFR BLD AUTO: 60.3 %
NRBC BLD-RTO: 0 /100 WBCS (ref 0–0)
PHOSPHATE SERPL-MCNC: 3.6 MG/DL (ref 2.5–4.9)
PLATELET # BLD AUTO: 278 X10*3/UL (ref 150–450)
POTASSIUM SERPL-SCNC: 4.2 MMOL/L (ref 3.5–5.3)
Q ONSET: 223 MS
QRS COUNT: 18 BEATS
QRS DURATION: 76 MS
QT INTERVAL: 364 MS
QTC CALCULATION(BAZETT): 495 MS
QTC FREDERICIA: 447 MS
R AXIS: -35 DEGREES
RBC # BLD AUTO: 3.7 X10*6/UL (ref 4–5.2)
SODIUM SERPL-SCNC: 147 MMOL/L (ref 136–145)
T AXIS: 102 DEGREES
T OFFSET: 405 MS
VENTRICULAR RATE: 111 BPM
WBC # BLD AUTO: 5.5 X10*3/UL (ref 4.4–11.3)

## 2025-02-13 PROCEDURE — 80069 RENAL FUNCTION PANEL: CPT | Performed by: INTERNAL MEDICINE

## 2025-02-13 PROCEDURE — 2060000001 HC INTERMEDIATE ICU ROOM DAILY

## 2025-02-13 PROCEDURE — 2500000001 HC RX 250 WO HCPCS SELF ADMINISTERED DRUGS (ALT 637 FOR MEDICARE OP): Performed by: INTERNAL MEDICINE

## 2025-02-13 PROCEDURE — 99232 SBSQ HOSP IP/OBS MODERATE 35: CPT | Performed by: INTERNAL MEDICINE

## 2025-02-13 PROCEDURE — 93005 ELECTROCARDIOGRAM TRACING: CPT

## 2025-02-13 PROCEDURE — 2500000004 HC RX 250 GENERAL PHARMACY W/ HCPCS (ALT 636 FOR OP/ED): Performed by: INTERNAL MEDICINE

## 2025-02-13 PROCEDURE — 83735 ASSAY OF MAGNESIUM: CPT | Performed by: INTERNAL MEDICINE

## 2025-02-13 PROCEDURE — 2500000005 HC RX 250 GENERAL PHARMACY W/O HCPCS: Performed by: INTERNAL MEDICINE

## 2025-02-13 PROCEDURE — 97530 THERAPEUTIC ACTIVITIES: CPT | Mod: GP,CQ

## 2025-02-13 PROCEDURE — 2500000001 HC RX 250 WO HCPCS SELF ADMINISTERED DRUGS (ALT 637 FOR MEDICARE OP): Performed by: NURSE PRACTITIONER

## 2025-02-13 PROCEDURE — 36415 COLL VENOUS BLD VENIPUNCTURE: CPT | Performed by: INTERNAL MEDICINE

## 2025-02-13 PROCEDURE — 97110 THERAPEUTIC EXERCISES: CPT | Mod: GP,CQ

## 2025-02-13 PROCEDURE — 85025 COMPLETE CBC W/AUTO DIFF WBC: CPT | Performed by: INTERNAL MEDICINE

## 2025-02-13 RX ORDER — METOPROLOL TARTRATE 25 MG/1
25 TABLET, FILM COATED ORAL EVERY 6 HOURS
Status: DISCONTINUED | OUTPATIENT
Start: 2025-02-13 | End: 2025-02-13

## 2025-02-13 RX ORDER — DEXTROSE MONOHYDRATE 50 MG/ML
100 INJECTION, SOLUTION INTRAVENOUS CONTINUOUS
Status: ACTIVE | OUTPATIENT
Start: 2025-02-13 | End: 2025-02-14

## 2025-02-13 RX ORDER — METOPROLOL TARTRATE 1 MG/ML
5 INJECTION, SOLUTION INTRAVENOUS EVERY 6 HOURS PRN
Status: DISCONTINUED | OUTPATIENT
Start: 2025-02-13 | End: 2025-02-14 | Stop reason: HOSPADM

## 2025-02-13 RX ORDER — METOPROLOL TARTRATE 50 MG/1
50 TABLET ORAL EVERY 6 HOURS
Status: DISCONTINUED | OUTPATIENT
Start: 2025-02-13 | End: 2025-02-14

## 2025-02-13 RX ADMIN — BENZONATATE 200 MG: 100 CAPSULE ORAL at 17:36

## 2025-02-13 RX ADMIN — Medication 1 TABLET: at 09:11

## 2025-02-13 RX ADMIN — BENZONATATE 200 MG: 100 CAPSULE ORAL at 22:00

## 2025-02-13 RX ADMIN — SENNOSIDES AND DOCUSATE SODIUM 2 TABLET: 50; 8.6 TABLET ORAL at 09:11

## 2025-02-13 RX ADMIN — DEXTROSE MONOHYDRATE 100 ML/HR: 5 INJECTION, SOLUTION INTRAVENOUS at 10:41

## 2025-02-13 RX ADMIN — POLYETHYLENE GLYCOL 3350 17 G: 17 POWDER, FOR SOLUTION ORAL at 09:09

## 2025-02-13 RX ADMIN — AMLODIPINE BESYLATE 5 MG: 5 TABLET ORAL at 09:13

## 2025-02-13 RX ADMIN — ACETAMINOPHEN 975 MG: 325 TABLET, FILM COATED ORAL at 22:01

## 2025-02-13 RX ADMIN — SODIUM BICARBONATE 650 MG: 650 TABLET ORAL at 09:11

## 2025-02-13 RX ADMIN — METOPROLOL TARTRATE 50 MG: 50 TABLET, FILM COATED ORAL at 22:00

## 2025-02-13 RX ADMIN — PRAVASTATIN SODIUM 20 MG: 40 TABLET ORAL at 22:00

## 2025-02-13 RX ADMIN — CETIRIZINE HYDROCHLORIDE 5 MG: 10 TABLET, FILM COATED ORAL at 09:11

## 2025-02-13 RX ADMIN — DEXTROSE MONOHYDRATE 100 ML/HR: 5 INJECTION, SOLUTION INTRAVENOUS at 23:10

## 2025-02-13 RX ADMIN — BENZONATATE 200 MG: 100 CAPSULE ORAL at 09:10

## 2025-02-13 RX ADMIN — SENNOSIDES AND DOCUSATE SODIUM 2 TABLET: 50; 8.6 TABLET ORAL at 22:00

## 2025-02-13 RX ADMIN — ASPIRIN 81 MG: 81 TABLET, COATED ORAL at 09:12

## 2025-02-13 RX ADMIN — METOPROLOL TARTRATE 50 MG: 50 TABLET, FILM COATED ORAL at 17:36

## 2025-02-13 RX ADMIN — SODIUM BICARBONATE 650 MG: 650 TABLET ORAL at 22:00

## 2025-02-13 RX ADMIN — HEPARIN SODIUM 5000 UNITS: 5000 INJECTION INTRAVENOUS; SUBCUTANEOUS at 09:09

## 2025-02-13 RX ADMIN — POLYETHYLENE GLYCOL 3350 17 G: 17 POWDER, FOR SOLUTION ORAL at 22:00

## 2025-02-13 RX ADMIN — APIXABAN 5 MG: 5 TABLET, FILM COATED ORAL at 17:36

## 2025-02-13 RX ADMIN — LIDOCAINE 4% 1 PATCH: 40 PATCH TOPICAL at 09:09

## 2025-02-13 RX ADMIN — AMIODARONE HYDROCHLORIDE 1 MG/MIN: 1.8 INJECTION, SOLUTION INTRAVENOUS at 22:00

## 2025-02-13 RX ADMIN — METOPROLOL TARTRATE 25 MG: 25 TABLET, FILM COATED ORAL at 09:11

## 2025-02-13 ASSESSMENT — COGNITIVE AND FUNCTIONAL STATUS - GENERAL
MOVING TO AND FROM BED TO CHAIR: TOTAL
DAILY ACTIVITIY SCORE: 7
TOILETING: TOTAL
MOBILITY SCORE: 8
WALKING IN HOSPITAL ROOM: TOTAL
CLIMB 3 TO 5 STEPS WITH RAILING: TOTAL
STANDING UP FROM CHAIR USING ARMS: TOTAL
MOBILITY SCORE: 6
TURNING FROM BACK TO SIDE WHILE IN FLAT BAD: TOTAL
DRESSING REGULAR LOWER BODY CLOTHING: TOTAL
TURNING FROM BACK TO SIDE WHILE IN FLAT BAD: A LOT
MOVING TO AND FROM BED TO CHAIR: TOTAL
DRESSING REGULAR UPPER BODY CLOTHING: TOTAL
DRESSING REGULAR LOWER BODY CLOTHING: TOTAL
CLIMB 3 TO 5 STEPS WITH RAILING: TOTAL
TURNING FROM BACK TO SIDE WHILE IN FLAT BAD: TOTAL
STANDING UP FROM CHAIR USING ARMS: TOTAL
WALKING IN HOSPITAL ROOM: TOTAL
EATING MEALS: A LOT
DAILY ACTIVITIY SCORE: 7
CLIMB 3 TO 5 STEPS WITH RAILING: TOTAL
TOILETING: TOTAL
PERSONAL GROOMING: TOTAL
HELP NEEDED FOR BATHING: TOTAL
MOVING FROM LYING ON BACK TO SITTING ON SIDE OF FLAT BED WITH BEDRAILS: A LOT
STANDING UP FROM CHAIR USING ARMS: TOTAL
MOVING TO AND FROM BED TO CHAIR: TOTAL
EATING MEALS: A LOT
WALKING IN HOSPITAL ROOM: TOTAL
MOBILITY SCORE: 6
MOVING FROM LYING ON BACK TO SITTING ON SIDE OF FLAT BED WITH BEDRAILS: TOTAL
DRESSING REGULAR UPPER BODY CLOTHING: TOTAL
HELP NEEDED FOR BATHING: TOTAL
PERSONAL GROOMING: TOTAL
MOVING FROM LYING ON BACK TO SITTING ON SIDE OF FLAT BED WITH BEDRAILS: TOTAL

## 2025-02-13 ASSESSMENT — PAIN SCALES - GENERAL: PAINLEVEL_OUTOF10: 3

## 2025-02-13 ASSESSMENT — PAIN - FUNCTIONAL ASSESSMENT: PAIN_FUNCTIONAL_ASSESSMENT: 0-10

## 2025-02-13 NOTE — SIGNIFICANT EVENT
"Patient went into A-fib with RVR.  This is new onset A-fib.  I spoke to her daughter Lila Brown over the phone.  Based on our discussion, we will not start any blood thinner.  A 2D echo will not be done.  Cardiology will not be consulted.  Will continue the current management.  Patient is already on beta-blocker.  She will follow-up with the day team in the morning.  /67 (BP Location: Right arm, Patient Position: Lying)   Pulse (!) 118   Temp 37.4 °C (99.3 °F) (Temporal)   Resp 20   Ht 1.575 m (5' 2.01\")   Wt 54.4 kg (119 lb 14.9 oz)   SpO2 97%   BMI 21.93 kg/m²       "

## 2025-02-13 NOTE — PROGRESS NOTES
"Physical Therapy    Physical Therapy Treatment    Patient Name: Camelia Jones  MRN: [id number]  Department: [address]  Room: [address] [place], [place] [address]A  Today's Date: 2/13/2025  Time Calculation  Start Time: 1209  Stop Time: 1232  Time Calculation (min): 23 min         Assessment/Plan   PT Assessment  PT Assessment Results: Decreased mobility, Decreased strength, Impaired balance, Pain, Decreased endurance  Rehab Prognosis: Good  End of Session Communication: Bedside nurse  Assessment Comment: Pt was aggitated but agreeable to tx. Oriented x4 for todays tx pt lacks BUE/BLE strength. Continue to recommend Mod intensity skilled PT at DC for return to PLOF.  End of Session Patient Position: Alarm off, caregiver present, Bed, 3 rail up (RN notified via secure chat alarm off)  PT Plan  Inpatient/Swing Bed or Outpatient: Inpatient  PT Plan  Treatment/Interventions: Bed mobility, Transfer training, Gait training, Therapeutic exercise, Therapeutic activity  PT Plan: Ongoing PT  PT Frequency: 3 times per week  PT Discharge Recommendations: Moderate intensity level of continued care  Equipment Recommended upon Discharge: Wheeled walker (owns)  PT Recommended Transfer Status: Assist x2  PT - OK to Discharge: Yes      General Visit Information:   PT  Visit  PT Received On: 02/13/25  General  Reason for Referral: 89 yo female admit with worsening mentation- UTI/PNA; referred to PT for impaired mobility/gait training  Referred By: Bear Silveira DO  Past Medical History Relevant to Rehab: PMH HTN, HLD, chronic anemia, CKD IV, chronic LE edema, pancreatitis, RLE DVT with post thrombotic syndrome, hx sacral ala / L5 fx, chronic hyponatremia.  Family/Caregiver Present: Yes  Prior to Session Communication: Bedside nurse  Patient Position Received: Bed, 4 rail up, Alarm on (Dtr present and very attentive)  Preferred Learning Style: verbal, visual, auditory  General Comment: Pt agitated especially with movement \"I will do it and if I can't then we aren't " "going to do it\" ; needed encouragement from daughter to participate in therapy assessment.    Subjective   Precautions:  Precautions  Medical Precautions: Fall precautions  Precautions Comment: Dtr reports no falls in over 1 year              Objective   Pain:  Pain Assessment  Pain Assessment: 0-10  0-10 (Numeric) Pain Score: 3 (getting to EOB \" I hurt everywhere\")  Cognition:  Cognition  Orientation Level: Oriented X4  Coordination:     Postural Control:  Static Sitting Balance  Static Sitting-Balance Support: Feet unsupported, Bilateral upper extremity supported  Static Sitting-Level of Assistance: Maximum assistance, Contact guard  Static Sitting-Comment/Number of Minutes: x 6 mins max assistance needed to maintain sitting at EOB pt unable to support herself.  Activity Tolerance:  Activity Tolerance  Endurance: Tolerates less than 10 min exercise, no significant change in vital signs  Treatments:  Therapeutic Exercise  Therapeutic Exercise Performed: Yes  Therapeutic Exercise Activity 1: supine ankle pumps, heel slides and at EOB knee extension all x10 reps for strengthening                             Bed Mobility  Bed Mobility: Yes  Bed Mobility 1  Bed Mobility 1: Supine to sitting  Level of Assistance 1: Maximum assistance, Contact guard, Moderate assistance  Bed Mobility Comments 1: Mod assist trunk up and max assist to keep pt at EOB. MOD verbal cues for hand placement/ LE to get to EOB.  Bed Mobility 2  Bed Mobility  2: Rolling right  Level of Assistance 2: Maximum assistance, Maximum verbal cues, Maximum tactile cues, +2  Bed Mobility 3  Bed Mobility 3: Scooting  Level of Assistance 3: Maximum assistance    Ambulation/Gait Training  Ambulation/Gait Training Performed: No  Transfers  Transfer: No                          Outcome Measures:  [facility] Basic Mobility  Turning from your back to your side while in a flat bed without using bedrails: A lot  Moving from lying on your back to sitting on the side of a " flat bed without using bedrails: A lot  Moving to and from bed to chair (including a wheelchair): Total  Standing up from a chair using your arms (e.g. wheelchair or bedside chair): Total  To walk in hospital room: Total  Climbing 3-5 steps with railing: Total  Basic Mobility - Total Score: 8    Education Documentation  Handouts, taught by Edna George PTA at 2/13/2025  1:20 PM.  Learner: Family, Patient  Readiness: Acceptance  Method: Explanation, Demonstration  Response: Verbalizes Understanding, Demonstrated Understanding    Precautions, taught by Edna George PTA at 2/13/2025  1:20 PM.  Learner: Family, Patient  Readiness: Acceptance  Method: Explanation, Demonstration  Response: Verbalizes Understanding, Demonstrated Understanding    Body Mechanics, taught by Edna George PTA at 2/13/2025  1:20 PM.  Learner: Family, Patient  Readiness: Acceptance  Method: Explanation, Demonstration  Response: Verbalizes Understanding, Demonstrated Understanding    Home Exercise Program, taught by Edna George PTA at 2/13/2025  1:20 PM.  Learner: Family, Patient  Readiness: Acceptance  Method: Explanation, Demonstration  Response: Verbalizes Understanding, Demonstrated Understanding    Mobility Training, taught by Edna George PTA at 2/13/2025  1:20 PM.  Learner: Family, Patient  Readiness: Acceptance  Method: Explanation, Demonstration  Response: Verbalizes Understanding, Demonstrated Understanding    Education Comments  No comments found.        OP EDUCATION:       Encounter Problems       Encounter Problems (Active)       Balance       STG - Maintains static sitting balance at EOB with upper extremity support modified IND x 10 min with SUPERVISION to progress toward PLOF  (Progressing)       Start:  02/03/25    Expected End:  02/17/25       INTERVENTIONS:  1. Practice sitting on the edge of a bed/mat with minimal support.  2. Educate patient about maintining total hip precautions while maintaining balance.  3.  Educate patient about pressure relief.  4. Educate patient about use of assistive device.            Mobility       STG - Patient will ambulate >100 ft with FWW SUPERVISION  (Not Progressing)       Start:  02/03/25    Expected End:  02/17/25               PT Transfers       STG - Transfer from bed to chair with SUPERVISION  (Progressing)       Start:  02/03/25    Expected End:  02/17/25            STG - Patient will perform bed mobility modified IND (Progressing)       Start:  02/03/25    Expected End:  02/17/25            STG - Patient will transfer sit to and from stand with FWW to/from various surfaces with SUPERVISION  (Progressing)       Start:  02/03/25    Expected End:  02/17/25               Pain - Adult

## 2025-02-13 NOTE — PROGRESS NOTES
[facility] Hospitalist Progress Note       [phone]: Please page me for patient care issues.  [phone]: Please page night hospitalist for any issues.     Camelia Jones  :  1934(89 y.o.)  MRN:  [id number]  PCP: Trav Rodriguez MD  LOS: 11  day(s) since admission    Assessment & Plan  Hypernatremia      Assessment and Plan:     Camelia Jones is a 89 y.o. female from ECF with PMH HTN, HLD, chronic anemia, CKD IV, chronic LE edema, pancreatitis, RLE DVT with post thrombotic syndrome, hx sacral ala / L5 fx, chronic hyponatremia.     Patient presented from ECF with several days of worsening mentation.  Associated with poor p.o. intake.  Patient was recently diagnosed with UTI and pneumonia at facility initially placed on doxycycline and later transitioned to Invanz IM.  Currently on fourth day of Invanz and a total of 1 week of antibiotics prior to arrival in the ED.  Patient admitted to clear productive cough and fatigue.  Denies fever, rigor, chest pain, dyspnea, worsening lower extremity edema, dysuria, anuria.  Per family at bedside mentation has significantly improved following IVF in ED     In ED VS was significant for mild hypertension.  Labs were significant for hyponatremia (151), hyperchloremia (114), azotemia from baseline (78/3.71), elevated BNP (861), elevated troponin (42), negative leukocytosis, negative flu/RSV/COVID 19.  CT head was unrevealing for acute intracranial processes.  CT chest abdomen pelvis shows RML and RLL pneumonia, moderate L pleural effusion and trace R pleural effusion.  ECG shows no acute ST changes or malignant arrhythmia.  Patient was admitted for hypernatremia and WARREN.     #New onset Afib w/ RVR while inpt  #Acute metabolic encephalopathy,  Waxes and wanes improving overall  #WARREN/CKD IV  #Anorexia, waxes and wanes  #Hypernatremia, resolved  #B/L pleural effusion (L>R) w/o hypoxia  #Recent RML/RLL pneumonia PTA  #Chronic LE edema  #Hx DVT  #Hx chronic  hyponatremia  #Anemia of chronic disease  -delirium protocol  -started on coreg(<-metoprolol), aapixaban and amiodarone  -MRI brain w/o acute findings  -s/p abx   -family does not want additional work up for  Afib including OAC  -PRN IVF  - Family made aware that hospice is the next best step if anorexia persist w/ associated complications ie electrolyte disturbance and/or WARREN  -diuretics (torsemide) on hold 2/2 WARREN and poor PO intake  -PT/OT  -Pleural fluid is too small to safely perform Thora (2/3/2025)  -CS notes reviewed and recs appreciated: Nephrology, ID, cardiology    Disposition: await placement and anticipate discharge 2/13/25    DVT Prophylaxis: Subq Heparin    Code status: DNR and No Intubation  Diet: Adult diet Regular    Level of MDM:  High    discussed with nurse, discussed with TCC/SW, patient and family updated, I personally examined the patient, and I personally reviewed chart, data, labs radiology reports    Family Communication  Number : Name of Designated Family Representative:      Total time spent: 35 minutes, of total time providing counseling or in coordination of care. Total time on this day of visit includes record and documentation review before and after visit including documentation and time not explicitly included on EMR time stamp      Subjective:   Interval History:  HPI  The patient complains of AMS  The patient feels their symptoms are slowly improving  no events or new concerns    Scheduled Meds:acetaminophen, 975 mg, oral, q8h  amLODIPine, 5 mg, oral, Daily  aspirin, 81 mg, oral, Daily  B complex-vitamin C, 1 tablet, oral, Daily  benzonatate, 200 mg, oral, TID  cetirizine, 5 mg, oral, Daily  [Held by provider] ferrous sulfate (325 mg ferrous sulfate), 65 mg of iron, oral, Daily  [Held by provider] gabapentin, 100 mg, oral, Nightly  heparin (porcine), 5,000 Units, subcutaneous, q8h LATA  lidocaine, 1 patch, transdermal, Daily  metoprolol tartrate, 25 mg, oral, q6h  polyethylene  glycol, 17 g, oral, BID  pravastatin, 20 mg, oral, Daily  sennosides-docusate sodium, 2 tablet, oral, BID  sodium bicarbonate, 650 mg, oral, BID  [Held by provider] torsemide, 10 mg, oral, Daily      Continuous Infusions:     PRN Meds:PRN medications: dextromethorphan-guaifenesin, ipratropium-albuteroL, metoprolol, OLANZapine **OR** OLANZapine, ondansetron, prochlorperazine    Review of Systems   All other systems reviewed and are negative.    Interval Pertinent History:  Social History     Tobacco Use    Smoking status: Never    Smokeless tobacco: Never   Substance Use Topics    Alcohol use: Never         Objective:   Patient Vitals for the past 24 hrs:   BP Temp Temp src Pulse Resp SpO2 Weight   25 0600 -- -- -- -- -- -- 58.1 kg (128 lb)   25 0455 (!) 160/104 36.4 °C (97.5 °F) Temporal 106 17 97 % --   25 2020 113/67 37.4 °C (99.3 °F) Temporal (!) 118 20 97 % --       Average, Min, and Max for last 24 hours Vitals:  TEMPERATURE:  Temp  Av.9 °C (98.4 °F)  Min: 36.4 °C (97.5 °F)  Max: 37.4 °C (99.3 °F)    RESPIRATIONS RANGE: Resp  Av.5  Min: 17  Max: 20    PULSE RANGE: Pulse  Av  Min: 106  Max: 118    BLOOD PRESSURE RANGE:  Systolic (24hrs), Av , Min:113 , Max:160   ; Diastolic (24hrs), Av, Min:67, Max:104      PULSE OXIMETRY RANGE: SpO2  Av %  Min: 97 %  Max: 97 %  Body mass index is 23.41 kg/m².    I/O last 3 completed shifts:  In: 1911.3 (32.9 mL/kg) [P.O.:220; I.V.:1691.3 (29.1 mL/kg)]  Out: 450 (7.8 mL/kg) [Urine:450 (0.2 mL/kg/hr)]  Weight: 58.1 kg   Weight change: 3.661 kg (8 lb 1.1 oz)     Physical Exam  Vitals and nursing note reviewed.   Constitutional:       Appearance: Normal appearance.   HENT:      Head: Normocephalic and atraumatic.      Right Ear: External ear normal.      Left Ear: External ear normal.      Nose: Nose normal.      Mouth/Throat:      Mouth: Mucous membranes are moist.   Eyes:      General: No scleral icterus.        Right eye: No  discharge.         Left eye: No discharge.      Extraocular Movements: Extraocular movements intact.      Conjunctiva/sclera: Conjunctivae normal.      Pupils: Pupils are equal, round, and reactive to light.   Cardiovascular:      Rate and Rhythm: Normal rate. Rhythm irregularly irregular.   Pulmonary:      Effort: Pulmonary effort is normal.      Breath sounds: No rhonchi.   Abdominal:      General: Abdomen is flat. Bowel sounds are normal.      Palpations: Abdomen is soft.   Musculoskeletal:         General: Normal range of motion.      Right lower leg: No edema.      Left lower leg: No edema.   Skin:     General: Skin is warm and dry.      Capillary Refill: Capillary refill takes less than 2 seconds.   Neurological:      General: No focal deficit present.      Mental Status: She is alert and oriented to person, place, and time. Mental status is at baseline.      Motor: No tremor (intermittent mild myoclonic juerks of upper torso).   Psychiatric:         Attention and Perception: She is attentive. She does not perceive auditory hallucinations.         Mood and Affect: Mood normal.         Speech: Speech normal.         Behavior: Behavior is not agitated. Behavior is cooperative.         Thought Content: Thought content normal.         Cognition and Memory: Cognition is not impaired. Memory is not impaired.         Judgment: Judgment normal.         Lab Results   Component Value Date     (H) 02/13/2025    K 4.2 02/13/2025     (H) 02/13/2025    CO2 23 02/13/2025    BUN 56 (H) 02/13/2025    CREATININE 3.24 (H) 02/13/2025    GLUCOSE 109 (H) 02/13/2025    CALCIUM 7.6 (L) 02/13/2025    PROT 6.8 02/03/2025    BILITOT 0.3 02/02/2025    ALKPHOS 74 02/02/2025    AST 19 02/02/2025    ALT 11 02/02/2025       Lab Results   Component Value Date    WBC 5.5 02/13/2025    HGB 10.8 (L) 02/13/2025    HCT 34.9 (L) 02/13/2025    MCV 94 02/13/2025     02/13/2025    LYMPHOPCT 16.1 02/13/2025    RBC 3.70 (L) 02/13/2025     MCH 29.2 02/13/2025    MCHC 30.9 (L) 02/13/2025    RDW 15.8 (H) 02/13/2025    CRP 5.67 (H) 02/04/2025       Lab Results   Component Value Date    TSH 1.40 02/02/2025     Lab Results   Component Value Date    LACTATE 0.9 02/03/2025     (H) 02/02/2025    INR 1.3 (H) 02/02/2025       IMAGES:  Encounter Date: 02/02/25   ECG 12 lead   Result Value    Ventricular Rate 74    Atrial Rate 76    QRS Duration 80    QT Interval 432    QTC Calculation(Bazett) 479    R Axis -30    T Axis 49    QRS Count 12    Q Onset 223    T Offset 439    QTC Fredericia 463    Narrative    Accelerated Junctional rhythm  Left axis deviation  Septal infarct , age undetermined  Abnormal ECG  When compared with ECG of 02-FEB-2025 15:59, (unconfirmed)  Junctional rhythm has replaced Sinus rhythm  Septal infarct is now Present  T wave inversion no longer evident in Lateral leads        No echocardiogram results found for the past 12 months  === 02/02/25 ===    XR CHEST 2 VIEWS    - Impression -  Small left pleural effusion.    MACRO:  None    Signed by: Nadia Malik 2/2/2025 4:54 PM  Dictation workstation:   [id number]  === 02/02/25 ===    CT CHEST ABDOMEN PELVIS WO CONTRAST    - Impression -  Chest  1.  Subtle patchy nodular airspace opacities are present in the  lingula, right middle lobe and to lesser extent left lower lobe.  Correlate with any symptoms of developing pneumonia.  2. Moderate left-sided and trace right-sided pleural effusion with  some atelectasis in the lower lobes.  3. Moderate coronary artery calcifications.    Abdomen-Pelvis  1.  Liquid stool is present throughout the colon without inflammatory  wall thickening. Correlate with any enterocolitis.  2. Scattered diverticula are present in the colon without evidence of  acute diverticulitis.  3. Fat containing right inguinal hernia.      MACRO:  None    Signed by: Rui Hayes 2/2/2025 5:53 PM  Dictation workstation:   [id number]  === 02/02/25 ===    XR CHEST  2 VIEWS    - Impression -  Small left pleural effusion.    MACRO:  None    Signed by: Nadia Malik 2/2/2025 4:54 PM  Dictation workstation:   [id number]  === 08/12/15 ===    - Impression -  There is extrahepatic biliary dilatation, with the common  duct measuring up to 9.7 mm.  No choledocholithiasis seen, and on the  MRCP images there appears to be smooth tapering of the duct towards  the ampulla, but a degree of ampullary stenosis again is not  excluded.  There is also an area of kinking in the proximal common  duct after the cystic duct insertion, though this is felt to be  physiologic, and proximally the duct is less ectatic.    Minimal layering high density material in the gallbladder lumen may  represent bile.    Renal cysts.    Scattered prominent subcentimeter mesenteric lymph nodes are  nonspecific.    Additional results of the last 24 hours have been reviewed.    Dictated using [employer] Version 2.4  Proof read however unrecognized voice recognition errors may have occurred     Electronically signed by Bear Silveira DO on 02/13/25 at 8:58 AM

## 2025-02-13 NOTE — PROGRESS NOTES
Camelia Jones is a 89 y.o. female on day 11 of admission presenting with Hypernatremia.    Subjective   Interval History: no fever, having ahard time eating her breakfast because she has no teeth        Review of Systems    Objective   Range of Vitals (last 24 hours)  Heart Rate:  [106-118]   Temp:  [36.4 °C (97.5 °F)-37.4 °C (99.3 °F)]   Resp:  [17-20]   BP: (113-160)/()   Weight:  [58.1 kg (128 lb)]   SpO2:  [92 %-97 %]   Daily Weight  02/13/25 : 58.1 kg (128 lb)    Body mass index is 23.41 kg/m².    Physical Exam  Constitutional:       Appearance: Normal appearance.   HENT:      Head: Normocephalic and atraumatic.      Mouth/Throat:      Mouth: Mucous membranes are moist.      Pharynx: Oropharynx is clear.   Eyes:      Pupils: Pupils are equal, round, and reactive to light.   Cardiovascular:      Rate and Rhythm: Normal rate and regular rhythm.      Heart sounds: Normal heart sounds.   Pulmonary:      Effort: Pulmonary effort is normal.      Breath sounds: Normal breath sounds.   Abdominal:      General: Abdomen is flat. Bowel sounds are normal.      Palpations: Abdomen is soft.   Musculoskeletal:      Cervical back: Normal range of motion.         Antibiotics  ertapenem - 1 gram    Relevant Results  Labs  Results from last 72 hours   Lab Units 02/13/25  0556 02/11/25  0838   WBC AUTO x10*3/uL 5.5 7.1   HEMOGLOBIN g/dL 10.8* 10.8*   HEMATOCRIT % 34.9* 35.7*   PLATELETS AUTO x10*3/uL 278 297   NEUTROS PCT AUTO % 60.3 74.6   LYMPHS PCT AUTO % 16.1 8.3   MONOS PCT AUTO % 15.2 11.4   EOS PCT AUTO % 5.7 3.8     Results from last 72 hours   Lab Units 02/13/25  0556 02/11/25  0838   SODIUM mmol/L 147* 146*   POTASSIUM mmol/L 4.2 4.3   CHLORIDE mmol/L 113* 115*   CO2 mmol/L 23 17*   BUN mg/dL 56* 54*   CREATININE mg/dL 3.24* 3.24*   GLUCOSE mg/dL 109* 103*   CALCIUM mg/dL 7.6* 7.7*   ANION GAP mmol/L 15 18   EGFR mL/min/1.73m*2 13* 13*   PHOSPHORUS mg/dL 3.6 4.6     Results from last 72 hours   Lab Units  02/13/25  0556 02/11/25  0838   ALBUMIN g/dL 2.5* 2.6*     Estimated Creatinine Clearance: 9.1 mL/min (A) (by C-G formula based on SCr of 3.24 mg/dL (H)).  C-Reactive Protein   Date Value Ref Range Status   02/04/2025 5.67 (H) <1.00 mg/dL Final   02/03/2025 5.73 (H) <1.00 mg/dL Final   02/02/2025 4.75 (H) <1.00 mg/dL Final     Microbiology  Reviewed  Imaging  Reviewed        Assessment/Plan   Pneumonia / effusion / atelectasis, procalcitonin 0.46, MRSA screen positive, completed antibiotics  Pyuria, negative culture  Encephalopathy, likely metabolic, fluctuating     Recommendations :  Supportive care  Up in the chair  May need a different food consistency  Discussed with the medical team     I spent minutes in the professional and overall care of this patient.      Kostas Miller MD

## 2025-02-14 ENCOUNTER — APPOINTMENT (OUTPATIENT)
Dept: CARDIOLOGY | Facility: HOSPITAL | Age: OVER 89
End: 2025-02-14
Payer: COMMERCIAL

## 2025-02-14 VITALS
RESPIRATION RATE: 24 BRPM | TEMPERATURE: 98.1 F | OXYGEN SATURATION: 93 % | SYSTOLIC BLOOD PRESSURE: 129 MMHG | WEIGHT: 128.97 LBS | BODY MASS INDEX: 23.73 KG/M2 | HEIGHT: 62 IN | HEART RATE: 90 BPM | DIASTOLIC BLOOD PRESSURE: 54 MMHG

## 2025-02-14 LAB
ALBUMIN SERPL BCP-MCNC: 2.5 G/DL (ref 3.4–5)
ANION GAP SERPL CALC-SCNC: 15 MMOL/L (ref 10–20)
AORTIC VALVE MEAN GRADIENT: 4 MMHG
AORTIC VALVE PEAK VELOCITY: 1.4 M/S
ATRIAL RATE: 119 BPM
AV PEAK GRADIENT: 8 MMHG
AVA (PEAK VEL): 1.91 CM2
AVA (VTI): 2.11 CM2
BASOPHILS # BLD AUTO: 0.03 X10*3/UL (ref 0–0.1)
BASOPHILS NFR BLD AUTO: 0.6 %
BUN SERPL-MCNC: 55 MG/DL (ref 6–23)
CALCIUM SERPL-MCNC: 7.4 MG/DL (ref 8.6–10.3)
CHLORIDE SERPL-SCNC: 105 MMOL/L (ref 98–107)
CO2 SERPL-SCNC: 22 MMOL/L (ref 21–32)
CREAT SERPL-MCNC: 3.13 MG/DL (ref 0.5–1.05)
EGFRCR SERPLBLD CKD-EPI 2021: 14 ML/MIN/1.73M*2
EJECTION FRACTION APICAL 4 CHAMBER: 53.2
EJECTION FRACTION: 53 %
EOSINOPHIL # BLD AUTO: 0.3 X10*3/UL (ref 0–0.4)
EOSINOPHIL NFR BLD AUTO: 6 %
ERYTHROCYTE [DISTWIDTH] IN BLOOD BY AUTOMATED COUNT: 16.1 % (ref 11.5–14.5)
GLUCOSE BLD MANUAL STRIP-MCNC: 117 MG/DL (ref 74–99)
GLUCOSE BLD MANUAL STRIP-MCNC: 120 MG/DL (ref 74–99)
GLUCOSE BLD MANUAL STRIP-MCNC: 125 MG/DL (ref 74–99)
GLUCOSE SERPL-MCNC: 125 MG/DL (ref 74–99)
HCT VFR BLD AUTO: 36.6 % (ref 36–46)
HGB BLD-MCNC: 11.2 G/DL (ref 12–16)
IMM GRANULOCYTES # BLD AUTO: 0.11 X10*3/UL (ref 0–0.5)
IMM GRANULOCYTES NFR BLD AUTO: 2.2 % (ref 0–0.9)
LEFT ATRIUM VOLUME AREA LENGTH INDEX BSA: 31.7 ML/M2
LEFT VENTRICLE INTERNAL DIMENSION DIASTOLE: 3.7 CM (ref 3.5–6)
LEFT VENTRICULAR OUTFLOW TRACT DIAMETER: 1.9 CM
LYMPHOCYTES # BLD AUTO: 0.88 X10*3/UL (ref 0.8–3)
LYMPHOCYTES NFR BLD AUTO: 17.5 %
MAGNESIUM SERPL-MCNC: 1.9 MG/DL (ref 1.6–2.4)
MCH RBC QN AUTO: 29 PG (ref 26–34)
MCHC RBC AUTO-ENTMCNC: 30.6 G/DL (ref 32–36)
MCV RBC AUTO: 95 FL (ref 80–100)
MITRAL VALVE E/A RATIO: 3.77
MONOCYTES # BLD AUTO: 0.71 X10*3/UL (ref 0.05–0.8)
MONOCYTES NFR BLD AUTO: 14.1 %
NEUTROPHILS # BLD AUTO: 3.01 X10*3/UL (ref 1.6–5.5)
NEUTROPHILS NFR BLD AUTO: 59.6 %
NRBC BLD-RTO: 0 /100 WBCS (ref 0–0)
PHOSPHATE SERPL-MCNC: 3.3 MG/DL (ref 2.5–4.9)
PLATELET # BLD AUTO: 285 X10*3/UL (ref 150–450)
POTASSIUM SERPL-SCNC: 4 MMOL/L (ref 3.5–5.3)
Q ONSET: 224 MS
QRS COUNT: 18 BEATS
QRS DURATION: 72 MS
QT INTERVAL: 376 MS
QTC CALCULATION(BAZETT): 503 MS
QTC FREDERICIA: 457 MS
R AXIS: -37 DEGREES
RBC # BLD AUTO: 3.86 X10*6/UL (ref 4–5.2)
RIGHT VENTRICLE FREE WALL PEAK S': 5.98 CM/S
RIGHT VENTRICLE PEAK SYSTOLIC PRESSURE: 46.7 MMHG
SODIUM SERPL-SCNC: 138 MMOL/L (ref 136–145)
T AXIS: 92 DEGREES
T OFFSET: 412 MS
TRICUSPID ANNULAR PLANE SYSTOLIC EXCURSION: 2.1 CM
VENTRICULAR RATE: 108 BPM
WBC # BLD AUTO: 5 X10*3/UL (ref 4.4–11.3)

## 2025-02-14 PROCEDURE — 36415 COLL VENOUS BLD VENIPUNCTURE: CPT | Performed by: INTERNAL MEDICINE

## 2025-02-14 PROCEDURE — 99239 HOSP IP/OBS DSCHRG MGMT >30: CPT | Performed by: INTERNAL MEDICINE

## 2025-02-14 PROCEDURE — 85025 COMPLETE CBC W/AUTO DIFF WBC: CPT | Performed by: INTERNAL MEDICINE

## 2025-02-14 PROCEDURE — 2500000004 HC RX 250 GENERAL PHARMACY W/ HCPCS (ALT 636 FOR OP/ED): Performed by: INTERNAL MEDICINE

## 2025-02-14 PROCEDURE — 83735 ASSAY OF MAGNESIUM: CPT | Performed by: INTERNAL MEDICINE

## 2025-02-14 PROCEDURE — 93306 TTE W/DOPPLER COMPLETE: CPT

## 2025-02-14 PROCEDURE — 2500000001 HC RX 250 WO HCPCS SELF ADMINISTERED DRUGS (ALT 637 FOR MEDICARE OP): Performed by: NURSE PRACTITIONER

## 2025-02-14 PROCEDURE — 80069 RENAL FUNCTION PANEL: CPT | Performed by: INTERNAL MEDICINE

## 2025-02-14 PROCEDURE — 99221 1ST HOSP IP/OBS SF/LOW 40: CPT | Performed by: NURSE PRACTITIONER

## 2025-02-14 PROCEDURE — 2500000001 HC RX 250 WO HCPCS SELF ADMINISTERED DRUGS (ALT 637 FOR MEDICARE OP): Performed by: INTERNAL MEDICINE

## 2025-02-14 PROCEDURE — 82947 ASSAY GLUCOSE BLOOD QUANT: CPT

## 2025-02-14 PROCEDURE — 93306 TTE W/DOPPLER COMPLETE: CPT | Performed by: INTERNAL MEDICINE

## 2025-02-14 PROCEDURE — 2500000005 HC RX 250 GENERAL PHARMACY W/O HCPCS: Performed by: INTERNAL MEDICINE

## 2025-02-14 PROCEDURE — 97110 THERAPEUTIC EXERCISES: CPT | Mod: GP,CQ

## 2025-02-14 RX ORDER — METOPROLOL TARTRATE 100 MG/1
100 TABLET ORAL 2 TIMES DAILY
Start: 2025-02-14

## 2025-02-14 RX ORDER — ACETAMINOPHEN 500 MG
1000 TABLET ORAL EVERY 8 HOURS
Start: 2025-02-14

## 2025-02-14 RX ORDER — CYANOCOBALAMIN 1000 UG/ML
1000 INJECTION, SOLUTION INTRAMUSCULAR; SUBCUTANEOUS
Start: 2025-02-14

## 2025-02-14 RX ORDER — METOPROLOL TARTRATE 50 MG/1
100 TABLET ORAL 2 TIMES DAILY
Status: DISCONTINUED | OUTPATIENT
Start: 2025-02-14 | End: 2025-02-14 | Stop reason: HOSPADM

## 2025-02-14 RX ADMIN — BENZONATATE 200 MG: 100 CAPSULE ORAL at 09:16

## 2025-02-14 RX ADMIN — METOPROLOL TARTRATE 50 MG: 50 TABLET, FILM COATED ORAL at 05:01

## 2025-02-14 RX ADMIN — CETIRIZINE HYDROCHLORIDE 5 MG: 10 TABLET, FILM COATED ORAL at 09:15

## 2025-02-14 RX ADMIN — AMIODARONE HYDROCHLORIDE 1 MG/MIN: 1.8 INJECTION, SOLUTION INTRAVENOUS at 01:45

## 2025-02-14 RX ADMIN — ACETAMINOPHEN 975 MG: 325 TABLET, FILM COATED ORAL at 05:01

## 2025-02-14 RX ADMIN — ASPIRIN 81 MG: 81 TABLET, COATED ORAL at 09:15

## 2025-02-14 RX ADMIN — Medication 1 TABLET: at 09:16

## 2025-02-14 RX ADMIN — ACETAMINOPHEN 975 MG: 325 TABLET, FILM COATED ORAL at 14:15

## 2025-02-14 RX ADMIN — AMLODIPINE BESYLATE 5 MG: 5 TABLET ORAL at 09:15

## 2025-02-14 RX ADMIN — SODIUM BICARBONATE 650 MG: 650 TABLET ORAL at 09:17

## 2025-02-14 RX ADMIN — POLYETHYLENE GLYCOL 3350 17 G: 17 POWDER, FOR SOLUTION ORAL at 09:16

## 2025-02-14 RX ADMIN — LIDOCAINE 4% 1 PATCH: 40 PATCH TOPICAL at 09:18

## 2025-02-14 RX ADMIN — APIXABAN 5 MG: 5 TABLET, FILM COATED ORAL at 05:01

## 2025-02-14 SDOH — ECONOMIC STABILITY: FOOD INSECURITY: WITHIN THE PAST 12 MONTHS, THE FOOD YOU BOUGHT JUST DIDN'T LAST AND YOU DIDN'T HAVE MONEY TO GET MORE.: NEVER TRUE

## 2025-02-14 SDOH — ECONOMIC STABILITY: FOOD INSECURITY

## 2025-02-14 SDOH — ECONOMIC STABILITY: HOUSING INSECURITY
IN THE LAST 12 MONTHS, WAS THERE A TIME WHEN YOU DID NOT HAVE A STEADY PLACE TO SLEEP OR SLEPT IN A SHELTER (INCLUDING NOW)?: NO

## 2025-02-14 SDOH — ECONOMIC STABILITY: INCOME INSECURITY: IN THE LAST 12 MONTHS, WAS THERE A TIME WHEN YOU WERE NOT ABLE TO PAY THE MORTGAGE OR RENT ON TIME?: NO

## 2025-02-14 SDOH — ECONOMIC STABILITY: FOOD INSECURITY: WITHIN THE PAST 12 MONTHS, YOU WORRIED THAT YOUR FOOD WOULD RUN OUT BEFORE YOU GOT MONEY TO BUY MORE.: NEVER TRUE

## 2025-02-14 SDOH — ECONOMIC STABILITY: GENERAL

## 2025-02-14 SDOH — ECONOMIC STABILITY: HOUSING INSECURITY: IN THE PAST 12 MONTHS HAS THE ELECTRIC, GAS, OIL, OR WATER COMPANY THREATENED TO SHUT OFF SERVICES IN YOUR HOME?: NO

## 2025-02-14 SDOH — ECONOMIC STABILITY: TRANSPORTATION INSECURITY
IN THE PAST 12 MONTHS, HAS THE LACK OF TRANSPORTATION KEPT YOU FROM MEDICAL APPOINTMENTS OR FROM GETTING MEDICATIONS?: NO

## 2025-02-14 SDOH — ECONOMIC STABILITY: HOUSING INSECURITY: IN THE LAST 12 MONTHS, WAS THERE A TIME WHEN YOU WERE NOT ABLE TO PAY THE MORTGAGE OR RENT ON TIME?: NO

## 2025-02-14 SDOH — ECONOMIC STABILITY: TRANSPORTATION INSECURITY: IN THE PAST 12 MONTHS, HAS LACK OF TRANSPORTATION KEPT YOU FROM MEDICAL APPOINTMENTS OR FROM GETTING MEDICATIONS?: NO

## 2025-02-14 SDOH — ECONOMIC STABILITY: HOUSING INSECURITY

## 2025-02-14 SDOH — ECONOMIC STABILITY: TRANSPORTATION INSECURITY

## 2025-02-14 SDOH — ECONOMIC STABILITY: HOUSING INSECURITY: IN THE LAST 12 MONTHS, HOW MANY PLACES HAVE YOU LIVED?: 1

## 2025-02-14 SDOH — ECONOMIC STABILITY: TRANSPORTATION INSECURITY
IN THE PAST 12 MONTHS, HAS LACK OF TRANSPORTATION KEPT YOU FROM MEETINGS, WORK, OR FROM GETTING THINGS NEEDED FOR DAILY LIVING?: NO

## 2025-02-14 SDOH — ECONOMIC STABILITY: FOOD INSECURITY: WITHIN THE PAST 12 MONTHS, YOU WORRIED THAT YOUR FOOD WOULD RUN OUT BEFORE YOU GOT THE MONEY TO BUY MORE.: NEVER TRUE

## 2025-02-14 ASSESSMENT — PAIN SCALES - GENERAL
PAINLEVEL_OUTOF10: 0 - NO PAIN
PAINLEVEL_OUTOF10: 2

## 2025-02-14 ASSESSMENT — COGNITIVE AND FUNCTIONAL STATUS - GENERAL
EATING MEALS: A LOT
MOBILITY SCORE: 7
DRESSING REGULAR LOWER BODY CLOTHING: A LOT
MOVING TO AND FROM BED TO CHAIR: TOTAL
WALKING IN HOSPITAL ROOM: TOTAL
DRESSING REGULAR UPPER BODY CLOTHING: A LOT
STANDING UP FROM CHAIR USING ARMS: TOTAL
TOILETING: TOTAL
DRESSING REGULAR LOWER BODY CLOTHING: TOTAL
CLIMB 3 TO 5 STEPS WITH RAILING: TOTAL
TURNING FROM BACK TO SIDE WHILE IN FLAT BAD: A LITTLE
HELP NEEDED FOR BATHING: TOTAL
MOVING TO AND FROM BED TO CHAIR: TOTAL
STANDING UP FROM CHAIR USING ARMS: TOTAL
MOVING FROM LYING ON BACK TO SITTING ON SIDE OF FLAT BED WITH BEDRAILS: A LITTLE
MOVING FROM LYING ON BACK TO SITTING ON SIDE OF FLAT BED WITH BEDRAILS: A LOT
DAILY ACTIVITIY SCORE: 11
TURNING FROM BACK TO SIDE WHILE IN FLAT BAD: TOTAL
DRESSING REGULAR UPPER BODY CLOTHING: TOTAL
WALKING IN HOSPITAL ROOM: TOTAL
MOBILITY SCORE: 7
PERSONAL GROOMING: A LOT
PERSONAL GROOMING: TOTAL
TOILETING: TOTAL
DAILY ACTIVITIY SCORE: 7
CLIMB 3 TO 5 STEPS WITH RAILING: TOTAL
HELP NEEDED FOR BATHING: TOTAL
MOVING FROM LYING ON BACK TO SITTING ON SIDE OF FLAT BED WITH BEDRAILS: A LOT
STANDING UP FROM CHAIR USING ARMS: TOTAL
TURNING FROM BACK TO SIDE WHILE IN FLAT BAD: TOTAL
MOVING TO AND FROM BED TO CHAIR: TOTAL
EATING MEALS: A LITTLE
MOBILITY SCORE: 10
WALKING IN HOSPITAL ROOM: TOTAL
CLIMB 3 TO 5 STEPS WITH RAILING: TOTAL

## 2025-02-14 ASSESSMENT — PAIN DESCRIPTION - LOCATION: LOCATION: BACK

## 2025-02-14 ASSESSMENT — PAIN - FUNCTIONAL ASSESSMENT: PAIN_FUNCTIONAL_ASSESSMENT: 0-10

## 2025-02-14 ASSESSMENT — SOCIAL DETERMINANTS OF HEALTH (SDOH): IN THE PAST 12 MONTHS, HAS THE ELECTRIC, GAS, OIL, OR WATER COMPANY THREATENED TO SHUT OFF SERVICE IN YOUR HOME?: NO

## 2025-02-14 ASSESSMENT — ACTIVITIES OF DAILY LIVING (ADL): LACK_OF_TRANSPORTATION: NO

## 2025-02-14 NOTE — CARE PLAN
The patient's goals for the shift include      The clinical goals for the shift include Pt will remain HDS this shift.    Over the shift, the patient did make progress toward the following goals. Preparing to discharge. Monitored and medicated as ordered this shift.

## 2025-02-14 NOTE — PROGRESS NOTES
02/14/25 1047   Discharge Planning   Living Arrangements Other (Comment)  ([facility])   Support Systems Children   Assistance Needed Currently patient not answering quesions appropriately-baseline A&Ox3-4;assist for ADLs and self propels in wheelchair; room air baseline and currently room air   Type of Residence Nursing home/residential care   Do you have animals or pets at home? No   Who is requesting discharge planning? Provider   Home or Post Acute Services Post acute facilities (Rehab/SNF/etc)   Expected Discharge Disposition Inter  (d/c back to [facility], no precert, orders complete and sent to facility via careport. transport confirmed for 1615 pickup. Patient, daughter ( at bedside), RN, facility notified of d/c time. Number for N2N report provided to MARK Ribeiro.)   Does the patient need discharge transport arranged? Yes   RoundTrip coordination needed? Yes   Has discharge transport been arranged? Yes   What day is the transport expected? 02/14/25   What time is the transport expected? 1615   Patient Choice   Provider Choice list and CMS website (https://medicare.gov/care-compare#search) for post-acute Quality and Resource Measure Data were provided and reviewed with: Family;Patient   Patient / Family choosing to utilize agency / facility established prior to hospitalization Yes   Stroke Family Assessment   Stroke Family Assessment Needed No   Intensity of Service   Intensity of Service 0-30 min

## 2025-02-14 NOTE — NURSING NOTE
Discharge orders received, med rec complete, report called to Nurse Rudy at [facility] at 1750. IV access and telemetry removed. Pt left floor via [facility] at 1830 with belongings. Daughter at bedside.

## 2025-02-14 NOTE — PROGRESS NOTES
Physical Therapy    Physical Therapy Treatment    Patient Name: Camelia Jones  MRN: [id number]  Department: [address]  Room: [address] [place] [address]  Today's Date: 2/14/2025  Time Calculation  Start Time: 1131  Stop Time: 1149  Time Calculation (min): 18 min         Assessment/Plan   PT Assessment  PT Assessment Results: Decreased mobility, Decreased strength, Impaired balance, Pain, Decreased endurance  Rehab Prognosis: Good  End of Session Communication: Bedside nurse  Assessment Comment: Pt has showed good efforts w/ tx. pt exhibits poor trunk control and mobility continue to recommend Mod intensity skilled PT at [place] for return to PLOF.  End of Session Patient Position: Bed, 3 rail up, Alarm on  PT Plan  Inpatient/Swing Bed or Outpatient: Inpatient  PT Plan  Treatment/Interventions: Bed mobility, Transfer training, Gait training, Therapeutic exercise, Therapeutic activity  PT Plan: Ongoing PT  PT Frequency: 3 times per week  PT Discharge Recommendations: Moderate intensity level of continued care  Equipment Recommended upon Discharge: Wheeled walker (owns)  PT Recommended Transfer Status: Assist x2  PT - OK to Discharge: Yes      General Visit Information:   PT  Visit  PT Received On: 02/14/25  General  Reason for Referral: 89 yo female admit with worsening mentation- UTI/PNA; referred to PT for impaired mobility/gait training  Referred By: Bear Silveira DO  Past Medical History Relevant to Rehab: PMH HTN, HLD, chronic anemia, CKD IV, chronic LE edema, pancreatitis, RLE DVT with post thrombotic syndrome, hx sacral ala / L5 fx, chronic hyponatremia.  Family/Caregiver Present: Yes  Prior to Session Communication: Bedside nurse  Patient Position Received: Bed, 4 rail up, Alarm on (Dtr present and very attentive)  Preferred Learning Style: verbal, visual, auditory  General Comment: Nursing was cleaning Pt at start of the session daughter was at bedside. Pt was pleasant and agreeable to work with PT.    Subjective    Precautions:  Precautions  Medical Precautions: Fall precautions  Precautions Comment: Dtr reports no falls in over 1 year              Objective   Pain:  Pain Assessment  Pain Assessment: 0-10  0-10 (Numeric) Pain Score: 2 (while at EOB)  Cognition:  Cognition  Orientation Level: Oriented X4  Coordination:     Postural Control:  Static Sitting Balance  Static Sitting-Balance Support: Feet unsupported, Bilateral upper extremity supported  Static Sitting-Level of Assistance: Contact guard, Moderate assistance (Tactile cues needed to continue sitting at EOB.)  Static Sitting-Comment/Number of Minutes: x2mins with UE support and x5mins assisted with foam wedge  Activity Tolerance:  Activity Tolerance  Endurance: Tolerates less than 10 min exercise, no significant change in vital signs  Treatments:  Therapeutic Exercise  Therapeutic Exercise Performed: Yes  Therapeutic Exercise Activity 1: supine ankle pumps, heel slides, SLR and at EOB knee extension all x10 reps for strengthening                             Bed Mobility  Bed Mobility: Yes  Bed Mobility 1  Bed Mobility 1: Rolling right  Level of Assistance 1: Contact guard, Moderate assistance  Bed Mobility 2  Bed Mobility  2: Supine to sitting  Level of Assistance 2: Close supervision  Bed Mobility 3  Bed Mobility 3: Scooting  Level of Assistance 3: Moderate tactile cues, Moderate verbal cues    Ambulation/Gait Training  Ambulation/Gait Training Performed: No  Transfers  Transfer: No                          Outcome Measures:  [facility] Basic Mobility  Turning from your back to your side while in a flat bed without using bedrails: A little  Moving from lying on your back to sitting on the side of a flat bed without using bedrails: A little  Moving to and from bed to chair (including a wheelchair): Total  Standing up from a chair using your arms (e.g. wheelchair or bedside chair): Total  To walk in hospital room: Total  Climbing 3-5 steps with railing: Total  Basic  Mobility - Total Score: 10    Education Documentation  No documentation found.  Education Comments  No comments found.        OP EDUCATION:       Encounter Problems       Encounter Problems (Active)       Balance       STG - Maintains static sitting balance at EOB with upper extremity support modified IND x 10 min with SUPERVISION to progress toward PLOF  (Progressing)       Start:  02/03/25    Expected End:  02/17/25       INTERVENTIONS:  1. Practice sitting on the edge of a bed/mat with minimal support.  2. Educate patient about maintining total hip precautions while maintaining balance.  3. Educate patient about pressure relief.  4. Educate patient about use of assistive device.            Mobility       STG - Patient will ambulate >100 ft with FWW SUPERVISION  (Progressing)       Start:  02/03/25    Expected End:  02/17/25               PT Transfers       STG - Transfer from bed to chair with SUPERVISION  (Progressing)       Start:  02/03/25    Expected End:  02/17/25            STG - Patient will perform bed mobility modified IND (Progressing)       Start:  02/03/25    Expected End:  02/17/25            STG - Patient will transfer sit to and from stand with FWW to/from various surfaces with SUPERVISION  (Progressing)       Start:  02/03/25    Expected End:  02/17/25               Pain - Adult

## 2025-02-14 NOTE — CONSULTS
Inpatient consult to Cardiology  Consult performed by: TOSHIA Suárez-CNP  Consult ordered by: Bear Silveira DO        History Of Present Illness:    Camelia Jones is a 89 y.o. female with PMH HTN, HLD, chronic anemia, CKD IV, chronic LE edema, pancreatitis, RLE DVT with post thrombotic syndrome, hx sacral ala / L5 fx, chronic hyponatremia presenting 2/2 from ECF with worsening mentation. In ED VS was significant for mild hypertension.  Labs were significant for hyponatremia (151), hyperchloremia (114), azotemia from baseline (78/3.71), elevated BNP (861), elevated troponin (42), negative leukocytosis, negative flu/RSV/COVID 19.  CT head was unrevealing for acute intracranial processes.  CT chest abdomen pelvis shows RML and RLL pneumonia, moderate L pleural effusion and trace R pleural effusion. Patient was admitted for hypernatremia and WARERN. Noted to be in atrial fibrillation with RVR 2/13. She was placed on amiodarone gtt and Eliquis 5 mg BID. She denies CP, palpitations.     Review of Systems   All other systems reviewed and are negative.    Last Recorded Vitals:  Vitals:    02/14/25 0400 02/14/25 0416 02/14/25 0726 02/14/25 0800   BP:  144/77     BP Location:  Right arm     Patient Position:  Lying     Pulse: 77 87  68   Resp: 14 (!) 30  17   Temp:  37 °C (98.6 °F) 36.7 °C (98.1 °F)    TempSrc:  Temporal     SpO2: 98% 98%  97%   Weight:  58.5 kg (128 lb 15.5 oz)     Height:           Last Labs:  CBC - 2/14/2025:  6:01 AM  5.0 11.2 285    36.6      CMP - 2/14/2025:  6:01 AM  7.4 6.8 19 --- 0.3   3.3 2.5 11 74      PTT - No results in last year.  1.3   14.9 _     Troponin I, High Sensitivity   Date/Time Value Ref Range Status   02/02/2025 03:36 PM 42 (H) 0 - 13 ng/L Final     BNP   Date/Time Value Ref Range Status   02/02/2025 03:36  (H) 0 - 99 pg/mL Final     Hemoglobin A1C   Date/Time Value Ref Range Status   09/06/2022 09:48 AM 4.8 % Final     Comment:          Diagnosis of Diabetes-Adults    "Non-Diabetic: < or = 5.6%   Increased risk for developing diabetes: 5.7-6.4%   Diagnostic of diabetes: > or = 6.5%  .       Monitoring of Diabetes                Age (y)     Therapeutic Goal (%)   Adults:          >18           <7.0   Pediatrics:    13-18           <7.5                   7-12           <8.0                   0- 6            7.5-8.5   American Diabetes Association. Diabetes Care 33(S1), Jan 2010.       VLDL   Date/Time Value Ref Range Status   09/06/2022 09:48 AM 27 0 - 40 mg/dL Final   03/15/2022 02:29 PM 26 0 - 40 mg/dL Final   11/04/2021 11:26 AM 27 0 - 40 mg/dL Final      Last I/O:  I/O last 3 completed shifts:  In: 365 (6.2 mL/kg) [P.O.:360; I.V.:5 (0.1 mL/kg)]  Out: 350 (6 mL/kg) [Urine:350 (0.2 mL/kg/hr)]  Weight: 58.5 kg     Past Cardiology Tests (Last 3 Years):  EKG:  Electrocardiogram, 12-lead   See HPI    Echo:  TTE 7/14/2022  CONCLUSIONS:   1. The left ventricular systolic function is normal with a 60-65% estimated ejection fraction.   2. Spectral Doppler shows a pseudonormal pattern of left ventricular diastolic filling.   3. Mild to moderate mitral valve regurgitation.   4. There is mild to moderate tricuspid regurgitation.   5. Mild aortic valve stenosis.   6. There is mild aortic valve regurgitation.   7. Moderately elevated pulmonary artery pressure, estimated RVSP 54mmHg.    Ejection Fractions:  No results found for: \"EF\"  Cath:  No results found for this or any previous visit from the past 1095 days.    Stress Test:  No results found for this or any previous visit from the past 1095 days.    Cardiac Imaging:  No results found for this or any previous visit from the past 1095 days.      Past Medical History:  She has a past medical history of Other conditions influencing health status, Other constipation, Pain in unspecified hip, Personal history of other diseases of the circulatory system, Personal history of other diseases of the respiratory system, Personal history of other " endocrine, nutritional and metabolic disease, Personal history of other medical treatment (11/14/2019), and Personal history of other mental and behavioral disorders.    Past Surgical History:  She has a past surgical history that includes Other surgical history (05/07/2013); Appendectomy (05/07/2013); Total abdominal hysterectomy (05/07/2013); Other surgical history (05/07/2013); and Cholecystectomy (11/30/2015).      Social History:  She reports that she has never smoked. She has never used smokeless tobacco. She reports that she does not drink alcohol and does not use drugs.    Family History:  No family history on file.     Allergies:  Azithromycin, Ciprofloxacin, Codeine, Macrolide antibiotics, Oxycodone, Penicillins, Sulfa (sulfonamide antibiotics), and Tramadol    Inpatient Medications:  Scheduled medications   Medication Dose Route Frequency    acetaminophen  975 mg oral q8h    amLODIPine  5 mg oral Daily    apixaban  5 mg oral q12h    aspirin  81 mg oral Daily    B complex-vitamin C  1 tablet oral Daily    benzonatate  200 mg oral TID    cetirizine  5 mg oral Daily    [Held by provider] ferrous sulfate (325 mg ferrous sulfate)  65 mg of iron oral Daily    [Held by provider] gabapentin  100 mg oral Nightly    lidocaine  1 patch transdermal Daily    metoprolol tartrate  50 mg oral q6h    polyethylene glycol  17 g oral BID    pravastatin  20 mg oral Daily    sennosides-docusate sodium  2 tablet oral BID    sodium bicarbonate  650 mg oral BID    [Held by provider] torsemide  10 mg oral Daily     PRN medications   Medication    dextromethorphan-guaifenesin    ipratropium-albuteroL    metoprolol    OLANZapine    Or    OLANZapine    ondansetron    prochlorperazine     Continuous Medications   Medication Dose Last Rate    amiodarone  0.5 mg/min      dextrose 5%  100 mL/hr 100 mL/hr (02/13/25 2310)     Outpatient Medications:  Current Outpatient Medications   Medication Instructions    acetaminophen (TYLENOL 8 HOUR)  650 mg, oral, Daily, Do not crush, chew, or split. Takes at 8am    acetaminophen (TYLENOL) 500 mg, oral, 2 times daily    albuterol 2.5 mg, nebulization, Every 8 hours PRN    amLODIPine (Norvasc) 5 mg tablet Daily RT    aspirin 81 mg, Daily    benzonatate (TESSALON) 100 mg, oral, 3 times daily, Do not crush or chew.    biotin 1 mg    calcium carbonate (OSCAL) 250 mg    carvedilol (COREG) 12.5 mg, oral, 2 times daily    cetirizine (ZYRTEC) 10 mg, Daily    cyanocobalamin (VITAMIN B-12) 1 g, intramuscular, Every 14 days    dextromethorphan-guaifenesin (Mucinex DM)  mg 12 hr tablet 1 tablet, Every 12 hours    docusate sodium (COLACE) 100 mg, 2 times daily    ertapenem (INVANZ) 1 g, intravenous, Daily, For 5 days    gabapentin (NEURONTIN) 100 mg, Nightly    KRILL OIL ORAL 1,000 mg    lidocaine (Lidoderm) 5 % patch 1 patch, transdermal, Daily, Remove & discard patch within 12 hours or as directed by MD.    omega-3 acid ethyl esters (LOVAZA) 1 g, Daily    ondansetron (ZOFRAN) 4 mg, oral, Every 6 hours PRN    polyethylene glycol (Miralax) 17 gram/dose powder as directed Orally    pravastatin (PRAVACHOL) 20 mg, Daily RT    sennosides (Senokot) 8.6 mg tablet 1 tablet, 2 times daily    sodium bicarbonate 650 mg, oral, 2 times daily    torsemide (DEMADEX) 10 mg, oral, Daily    TURMERIC ORAL 1,000 mg, oral, 2 times daily    vitamin E 400 Units, oral, Daily    zinc gluconate 50 mg tablet 1 (one) time each day at the same time.     Physical Exam  Vitals reviewed.   Constitutional:       Appearance: Normal appearance. She is normal weight.   HENT:      Head: Normocephalic and atraumatic.   Neck:      Vascular: No carotid bruit or JVD.   Cardiovascular:      Rate and Rhythm: Normal rate. Rhythm irregularly irregular.      Pulses: Normal pulses.      Heart sounds: Normal heart sounds.   Pulmonary:      Effort: Pulmonary effort is normal.      Breath sounds: Normal breath sounds.   Chest:      Chest wall: No tenderness.    Abdominal:      General: Abdomen is flat. Bowel sounds are normal.      Palpations: Abdomen is soft.   Skin:     General: Skin is warm and dry.   Neurological:      General: No focal deficit present.      Mental Status: She is alert and oriented to person, place, and time. Mental status is at baseline.   Psychiatric:         Mood and Affect: Mood normal.         Behavior: Behavior normal.          Assessment/Plan   Assessment  89 y.o. female with PMH HTN, HLD, chronic anemia, CKD IV, chronic LE edema, pancreatitis, RLE DVT with post thrombotic syndrome, hx sacral ala / L5 fx, chronic hyponatremia presenting 2/2 from ECF with worsening mentation. She was admitted for hypernatremia and WARREN. Noted to be in atrial fibrillation with RVR 2/13. She was placed on amiodarone gtt and Eliquis 5 mg BID. She denies CP, palpitations.     [id number] Score  Age >= 75: 2   Sex Female: 1   CHF History No: 0   HTN Yes: 1   Stroke/TIA/Thromboembolism Yes: 2   Vascular Dz: CAD/PAD/Aortic Plaque No: 0   DM No: 0   Total Score 6        Plan  -TTE pending  -Recommend rate control  -Stop IV amiodarone  -Continue metoprolol tartrate 100 mg BID  -Reduce Eliquis dose to 2.5 mg BID (creatinine >1.5, age>80, weight<60kg)  -Will sign off    Peripheral IV 22 G Right;Anterior Forearm (Active)   Site Assessment Clean;Dry;Intact 02/13/25 2300   Dressing Type Transparent 02/13/25 2300   Line Status Flushed;Infusing 02/13/25 2300   Dressing Status Clean;Dry 02/13/25 2300   Number of days:        Code Status:  DNR and No Intubation    I spent minutes in the professional and overall care of this patient.        Kyle Mcintosh, APRN-CNP

## 2025-02-14 NOTE — DISCHARGE SUMMARY
[facility] Hospitalist  Discharge Summary with Discharge Day Progress Note and Transition Note                 Camelia Jones                  : 1934                      MRN:  [id number]     ADMIT DATE: 2025            DISCHARGE DATE:  2025    LOS: 12  day(s) since admission     PRIMARYCARE PHYSICIAN:  Trav Rodriguez MD     VISIT STATUS: Admission     CODE STATUS:  DNR and No Intubation     DISCHARGE DIAGNOSES:    Assessment & Plan  Hypernatremia       HOSPITAL COURSE:  Camelia Jones is a 89 y.o. female from ECF with PMH HTN, HLD, chronic anemia, CKD IV, chronic LE edema, pancreatitis, RLE DVT with post thrombotic syndrome, hx sacral ala / L5 fx, chronic hyponatremia.     Patient presented from ECF with several days of worsening mentation.  Associated with poor p.o. intake.  Patient was recently diagnosed with UTI and pneumonia at facility initially placed on doxycycline and later transitioned to Invanz IM.  Currently on fourth day of Invanz and a total of 1 week of antibiotics prior to arrival in the ED.  Patient admitted to clear productive cough and fatigue.  Denies fever, rigor, chest pain, dyspnea, worsening lower extremity edema, dysuria, anuria. Per family at bedside mentation has significantly improved following IVF in ED     In ED VS was significant for mild hypertension.  Labs were significant for hyponatremia (151), hyperchloremia (114), azotemia from baseline (78/3.71), elevated BNP (861), elevated troponin (42), negative leukocytosis, negative flu/RSV/COVID 19.  CT head was unrevealing for acute intracranial processes.  CT chest abdomen pelvis shows RML and RLL pneumonia, moderate L pleural effusion and trace R pleural effusion.  ECG shows no acute ST changes or malignant arrhythmia.  Patient was admitted for hypernatremia and WARREN but later developed Afib with RVR. Prolonged hospital stay due to AMS and poor PO intake. Patient clinically improved and later discharged back to ECF  "once medically optimized.     #New onset Afib w/ RVR while inpt, started on apixaban, rate controlled with metoprolol  #Acute metabolic encephalopathy, resolved  #WARREN/CKD IV  #Anorexia, waxes and wanes but improving  #Hypernatremia, resolved  #B/L pleural effusion (L>R) w/o hypoxia  #Recent RML/RLL pneumonia PTA  #Chronic LE edema  #Hx DVT  #Hx chronic hyponatremia  #Anemia of chronic disease  -MRI brain w/o acute findings  -s/p abx   - Family made aware that hospice is the next best step if anorexia persist w/ associated complications ie electrolyte disturbance and/or WARREN  -diuretics (torsemide) on hold  WARREN and poor PO intake  -Pleural fluid is too small to safely perform Thora (2/3/2025)    PROCEDURES:  none  CONSULTANTS:  Nephrology, ID, cardiology    COMPLEXITY OF FOLLOW UP:  [] Moderate Complexity: follow up within 7-14 calendar days (26857)  []Severe Complexity: follow up within 7 calendar days (81978)     FOLLOW UP TESTING, PENDING RESULTS ORREFERRALS AT TRANSITIONAL CARE VISIT:   []Yes   [] No    DAY OF DISCHARGE:  Review of Systems   All other systems reviewed and are negative.       Patient Vitals for the past 24 hrs:   BP Temp Temp src Pulse Resp SpO2 Height Weight   25 0800 -- -- -- 68 17 97 % -- --   25 0726 -- 36.7 °C (98.1 °F) -- -- -- -- -- --   25 0416 144/77 37 °C (98.6 °F) Temporal 87 (!) 30 98 % -- 58.5 kg (128 lb 15.5 oz)   25 0400 -- -- -- 77 14 98 % -- --   25 0000 -- -- -- 90 19 98 % -- --   25 122/60 36.9 °C (98.4 °F) Temporal 91 21 94 % 1.575 m (5' 2.01\") 57.5 kg (126 lb 12.2 oz)   25 1139 144/79 36.9 °C (98.4 °F) -- (!) 117 -- 92 % -- --        Average, Min, and Max forlast 24 hours Vitals:  TEMPERATURE:  Temp  Av.9 °C (98.4 °F)  Min: 36.7 °C (98.1 °F)  Max: 37 °C (98.6 °F)     RESPIRATIONS RANGE: Resp  Av.2  Min: 14  Max: 30     PULSE RANGE: Pulse  Av.3  Min: 68  Max: 117     BLOOD PRESSURE RANGE:  Systolic (24hrs), Av " , Min:122 , Max:144   ; Diastolic (24hrs), Av, Min:60, Max:79       PULSE OXIMETRYRANGE: SpO2  Av.2 %  Min: 92 %  Max: 98 %  Body mass index is 23.58 kg/m².     I/O last 3 completed shifts:  In: 365 (6.2 mL/kg) [P.O.:360; I.V.:5 (0.1 mL/kg)]  Out: 350 (6 mL/kg) [Urine:350 (0.2 mL/kg/hr)]  Weight: 58.5 kg      Physical Exam  Vitals and nursing note reviewed.   Constitutional:       Appearance: Normal appearance.   HENT:      Head: Normocephalic and atraumatic.      Right Ear: External ear normal.      Left Ear: External ear normal.      Nose: Nose normal.      Mouth/Throat:      Mouth: Mucous membranes are moist.   Eyes:      General: No scleral icterus.        Right eye: No discharge.         Left eye: No discharge.      Extraocular Movements: Extraocular movements intact.      Conjunctiva/sclera: Conjunctivae normal.      Pupils: Pupils are equal, round, and reactive to light.   Cardiovascular:      Rate and Rhythm: Normal rate. Rhythm irregularly irregular.   Pulmonary:      Effort: Pulmonary effort is normal.      Breath sounds: No rhonchi.   Abdominal:      General: Abdomen is flat. Bowel sounds are normal.      Palpations: Abdomen is soft.   Musculoskeletal:         General: Normal range of motion.      Right lower leg: No edema.      Left lower leg: No edema.   Skin:     General: Skin is warm and dry.      Capillary Refill: Capillary refill takes less than 2 seconds.   Neurological:      General: No focal deficit present.      Mental Status: She is alert and oriented to person, place, and time. Mental status is at baseline.      Motor: No tremor.   Psychiatric:         Attention and Perception: She is attentive. She does not perceive auditory hallucinations.         Mood and Affect: Mood normal.         Speech: Speech normal.         Behavior: Behavior is not agitated. Behavior is cooperative.         Thought Content: Thought content normal.         Cognition and Memory: Cognition is not impaired.  Memory is not impaired.         Judgment: Judgment normal.           DISCHARGE MEDICATIONS:     Significant Medication Changes:         Your medication list        START taking these medications        Instructions Last Dose Given Next Dose Due   apixaban 5 mg tablet  Commonly known as: Eliquis      Take 1 tablet (5 mg) by mouth every 12 hours.       metoprolol tartrate 100 mg tablet  Commonly known as: Lopressor      Take 1 tablet by mouth 2 times a day.              CHANGE how you take these medications        Instructions Last Dose Given Next Dose Due   acetaminophen 500 mg tablet  Commonly known as: Tylenol  What changed:   how much to take  when to take this  Another medication with the same name was removed. Continue taking this medication, and follow the directions you see here.      Take 2 tablets (1,000 mg) by mouth every 8 hours.       cyanocobalamin 1,000 mcg/mL injection  Commonly known as: Vitamin B-12  What changed:   how much to take  when to take this      Inject 1 mL (1,000 mcg) into the muscle every 30 (thirty) days.              CONTINUE taking these medications        Instructions Last Dose Given Next Dose Due   albuterol 2.5 mg /3 mL (0.083 %) nebulizer solution           amLODIPine 5 mg tablet  Commonly known as: Norvasc           benzonatate 100 mg capsule  Commonly known as: Tessalon           biotin 1 mg tablet           cetirizine 10 mg chewable tablet  Commonly known as: ZyrTEC           dextromethorphan-guaifenesin  mg 12 hr tablet  Commonly known as: Mucinex DM           docusate sodium 100 mg capsule  Commonly known as: Colace           gabapentin 100 mg capsule  Commonly known as: Neurontin           KRILL OIL ORAL           lidocaine 5 % patch  Commonly known as: Lidoderm           Miralax 17 gram/dose powder  Generic drug: polyethylene glycol           omega-3 acid ethyl esters 1 gram capsule  Commonly known as: Lovaza           ondansetron 4 mg tablet  Commonly known as:  Zofran           pravastatin 20 mg tablet  Commonly known as: Pravachol           sennosides 8.6 mg tablet  Commonly known as: Senokot           sodium bicarbonate 650 mg tablet           TURMERIC ORAL           vitamin E 180 mg (400 unit) capsule           zinc gluconate 50 mg tablet                  STOP taking these medications      aspirin 81 mg EC tablet        carvedilol 12.5 mg tablet  Commonly known as: Coreg        ertapenem 1 gram injection  Commonly known as: INVanz        torsemide 20 mg tablet  Commonly known as: Demadex               ASK your doctor about these medications        Instructions Last Dose Given Next Dose Due   calcium carbonate 500 mg calcium (1,250 mg) tablet  Commonly known as: Oscal                     Where to Get Your Medications        Information about where to get these medications is not yet available    Ask your nurse or doctor about these medications  acetaminophen 500 mg tablet  apixaban 5 mg tablet  cyanocobalamin 1,000 mcg/mL injection  metoprolol tartrate 100 mg tablet            DIET: regular      DISPOSITION:  ECF     Follow up with Trav Rodriguez MD  .     DISCHARGE TIME: > 30 minutes    Electronically signed by Bear Silveira DO on 02/14/25 at 10:34 AM    Dictated using Dragon Naturally Speaking Medical Version 2.4  Proof read however unrecognized voice recognition errors may have occurred

## 2025-02-14 NOTE — PROGRESS NOTES
"Camelia Jones is a 89 y.o. female on day 12 of admission presenting with Hypernatremia.    Subjective   Interval History: no fever, asking to go home        Review of Systems    Objective   Range of Vitals (last 24 hours)  Heart Rate:  []   Temp:  [36.7 °C (98.1 °F)-37 °C (98.6 °F)]   Resp:  [14-30]   BP: (122-144)/(60-79)   Height:  [157.5 cm (5' 2.01\")]   Weight:  [57.5 kg (126 lb 12.2 oz)-58.5 kg (128 lb 15.5 oz)]   SpO2:  [92 %-98 %]   Daily Weight  02/14/25 : 58.5 kg (128 lb 15.5 oz)    Body mass index is 23.58 kg/m².    Physical Exam  Constitutional:       Appearance: Normal appearance.   HENT:      Head: Normocephalic and atraumatic.      Mouth/Throat:      Mouth: Mucous membranes are moist.      Pharynx: Oropharynx is clear.   Eyes:      Pupils: Pupils are equal, round, and reactive to light.   Cardiovascular:      Rate and Rhythm: Normal rate and regular rhythm.      Heart sounds: Normal heart sounds.   Pulmonary:      Effort: Pulmonary effort is normal.      Breath sounds: Normal breath sounds.   Abdominal:      General: Abdomen is flat. Bowel sounds are normal.      Palpations: Abdomen is soft.   Musculoskeletal:      Cervical back: Normal range of motion.         Antibiotics  ertapenem - 1 gram    Relevant Results  Labs  Results from last 72 hours   Lab Units 02/14/25  0601 02/13/25  0556   WBC AUTO x10*3/uL 5.0 5.5   HEMOGLOBIN g/dL 11.2* 10.8*   HEMATOCRIT % 36.6 34.9*   PLATELETS AUTO x10*3/uL 285 278   NEUTROS PCT AUTO % 59.6 60.3   LYMPHS PCT AUTO % 17.5 16.1   MONOS PCT AUTO % 14.1 15.2   EOS PCT AUTO % 6.0 5.7     Results from last 72 hours   Lab Units 02/14/25  0601 02/13/25  0556   SODIUM mmol/L 138 147*   POTASSIUM mmol/L 4.0 4.2   CHLORIDE mmol/L 105 113*   CO2 mmol/L 22 23   BUN mg/dL 55* 56*   CREATININE mg/dL 3.13* 3.24*   GLUCOSE mg/dL 125* 109*   CALCIUM mg/dL 7.4* 7.6*   ANION GAP mmol/L 15 15   EGFR mL/min/1.73m*2 14* 13*   PHOSPHORUS mg/dL 3.3 3.6     Results from last 72 hours "   Lab Units 02/14/25  0601 02/13/25  0556   ALBUMIN g/dL 2.5* 2.5*     Estimated Creatinine Clearance: 9.4 mL/min (A) (by C-G formula based on SCr of 3.13 mg/dL (H)).  C-Reactive Protein   Date Value Ref Range Status   02/04/2025 5.67 (H) <1.00 mg/dL Final   02/03/2025 5.73 (H) <1.00 mg/dL Final   02/02/2025 4.75 (H) <1.00 mg/dL Final     Microbiology  Reviewed  Imaging  Reviewed        Assessment/Plan   Pneumonia / effusion / atelectasis, procalcitonin 0.46, MRSA screen positive, completed antibiotics  Pyuria, negative culture  Encephalopathy, likely metabolic, fluctuating     Recommendations :  Supportive care  Discussed with the medical team     I spent minutes in the professional and overall care of this patient.      Kostas Miller MD

## 2025-02-17 ENCOUNTER — NURSING HOME VISIT (OUTPATIENT)
Dept: POST ACUTE CARE | Facility: EXTERNAL LOCATION | Age: OVER 89
End: 2025-02-17
Payer: COMMERCIAL

## 2025-02-17 DIAGNOSIS — I48.0 PAROXYSMAL A-FIB (MULTI): ICD-10-CM

## 2025-02-17 DIAGNOSIS — J18.9 HCAP (HEALTHCARE-ASSOCIATED PNEUMONIA): Primary | ICD-10-CM

## 2025-02-17 DIAGNOSIS — N18.5 CHRONIC KIDNEY DISEASE, STAGE V (MULTI): ICD-10-CM

## 2025-02-17 PROCEDURE — 99305 1ST NF CARE MODERATE MDM 35: CPT | Performed by: FAMILY MEDICINE

## 2025-02-17 ASSESSMENT — ENCOUNTER SYMPTOMS
SORE THROAT: 0
HEMATURIA: 0
ADENOPATHY: 0
HALLUCINATIONS: 0
EYE REDNESS: 0
SHORTNESS OF BREATH: 0
WOUND: 0
FATIGUE: 0
COUGH: 0
BACK PAIN: 1
PALPITATIONS: 0
POLYDIPSIA: 0
ARTHRALGIAS: 1
ABDOMINAL PAIN: 0
HEADACHES: 0
WEAKNESS: 1
CHILLS: 0
BRUISES/BLEEDS EASILY: 0
FEVER: 0
DYSURIA: 0
RHINORRHEA: 0
NECK STIFFNESS: 0
EYE PAIN: 0
BLOOD IN STOOL: 0

## 2025-02-17 NOTE — PROGRESS NOTES
Camelia Jones is a 89 y.o. female with Chief Complaint of SNF ([facility]) Re-Admit H&P    Resident seen 25 -- MP    CC: LTC ([facility]) Re-admit H&P    : 1934  SNF H&P done 23, 23, 25  DC Summary dated 25 reviewed 25  Allergy: Azithro, Codeine, PCN, Cipro, Sulfa, Corn  DNR-CCA, NO HD    S: 89 yo woman with HLD, CKD4, HTN, OA anemia and hyponatremia readmitted to LTC after lengthy hopital chelly for acute pneumonia complicated by new onset AFIB in RVR. Med List & Problem list reviewed. No cp/sob.    O: VSS AFEB 131# (up 5#) A&O NAD. Chest cta. heart rrr,  BRIDGET. Ext left LE Edema 3+ > Right 2+ wearing light weight compression stockings.    LAB (25) Hgb 11.6, Na 139, K 4.8, Cr 3.69, GFR 11, ->57530  (24) Ferritin 844, Iron 39, TIBC 210  (10/31/24)  TSH 3.93  (2024) , HDL 52, LDL 71  (23) Urine Electrophoresis -- No M Protein. Urine osmolality 513.    A/P:  # Muscle weakness and gait instability: Largely Wheelchair bound. Walks with walker at times.  Recommend PT eval and treat for strengthening and pain control -- part B if does not qualify for SNF.   # New onset AFIB w RVR: back in SR on Eliquis,   # CAP: resolved with course of IV abx 2025.   # Left>Right knee OA: improved with topical blue emu and CSI 24, 25 -- next no sooner than 25.   # Hx Hyponatremia: resolved OFF salt tablet. Monitor levels. Continue FR to 1800 cc 24.  # CHF/Edema: EUVOLEMIC (although BNP up, likely due to CKD5)even OFF torsemide. Can restart for any cardiopulmonary symptoms of fluid overload.   # Lumbar spondylosis and hx L5 & Sacral Ala fx: pain control inadequate with tylenol. Pt refuses oxycodone and tramadol. Completed SNF Rehab. LTC. F/U with ortho. Lidoderm patch.  # Depression and Chronic pain: patient declines offer of cymbalta 30 mg qam.  # B/L heel pain c/w PF -- night splints.  # HTN: stable -- cut norvasc to 5, Coreg 12.5 bid.  # HLD: statin, ASA  #  Anemia: persistent despite B12 replenishment. Getting Iron infusions and EPO per [facility] -- next appt 1/3/25.  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 F/U [facility] [place] (seen 11/19/24). NO HD per patients expressed advance directives. Stable on NaHCO3 for metabolic acidosis.  # Insomnia: Patient declines Ambien, melatonin and tramadol. Offer herbal tea, capsaicin cream.  # Hx Constipation: OFF tums, Ca, & Fe. Continue colace & miralax.  # Exposure to flu: tamiflu 30 mg q48h due to GFR 10-30.  Past Surgical History:   Procedure Laterality Date    APPENDECTOMY  05/07/2013    Appendectomy    CHOLECYSTECTOMY  11/30/2015    Cholecystectomy Laparoscopic    OTHER SURGICAL HISTORY  05/07/2013    Treatment Of Hip Fracture    OTHER SURGICAL HISTORY  05/07/2013    Marsupialization Of Ovarian Cyst Laparoscopic    TOTAL ABDOMINAL HYSTERECTOMY  05/07/2013    Total Abdominal Hysterectomy      Social History     Socioeconomic History    Marital status:      Spouse name: Not on file    Number of children: Not on file    Years of education: Not on file    Highest education level: Not on file   Occupational History    Not on file   Tobacco Use    Smoking status: Never    Smokeless tobacco: Never   Vaping Use    Vaping status: Not on file   Substance and Sexual Activity    Alcohol use: Never    Drug use: Never    Sexual activity: Defer   Other Topics Concern    Not on file   Social History Narrative    Not on file     Social Drivers of Health     Financial Resource Strain: Low Risk  (2/2/2025)    Overall Financial Resource Strain (CARDIA)     Difficulty of Paying Living Expenses: Not very hard   Food Insecurity: No Food Insecurity (2/2/2025)    Hunger Vital Sign     Worried About Running Out of Food in the Last Year: Never true     Ran Out of Food in the Last Year: Never true   Transportation Needs: No Transportation Needs (2/2/2025)    PRAPARE - Transportation     Lack of Transportation (Medical): No     Lack of Transportation (Non-Medical):  No   Physical Activity: Not on file   Stress: No Stress Concern Present (2/2/2025)    [employer] [place] [employer] - Occupational Stress Questionnaire     Feeling of Stress : Not at all   Social Connections: Not on file   Intimate Partner Violence: Not At Risk (2/2/2025)    Humiliation, Afraid, Rape, and Kick questionnaire     Fear of Current or Ex-Partner: No     Emotionally Abused: No     Physically Abused: No     Sexually Abused: No   Housing Stability: Low Risk  (2/14/2025)    Housing Stability Vital Sign     Unable to Pay for Housing in the Last Year: No     Number of Times Moved in the Last Year: 0     Homeless in the Last Year: No     Past Medical History:   Diagnosis Date    Other conditions influencing health status     Coronary Artery Disease    Other constipation     Chronic constipation    Pain in unspecified hip     Joint pain, hip    Personal history of other diseases of the circulatory system     History of hypertension    Personal history of other diseases of the respiratory system     History of allergic rhinitis    Personal history of other endocrine, nutritional and metabolic disease     History of hyperlipidemia    Personal history of other medical treatment 11/14/2019    History of screening mammography    Personal history of other mental and behavioral disorders     History of depression      No family history on file.     Review of Systems   Constitutional:  Negative for chills, fatigue and fever.   HENT:  Negative for rhinorrhea and sore throat.    Eyes:  Negative for pain and redness.   Respiratory:  Negative for cough and shortness of breath.    Cardiovascular:  Negative for chest pain and palpitations.   Gastrointestinal:  Negative for abdominal pain and blood in stool.   Endocrine: Negative for polydipsia and polyuria.   Genitourinary:  Negative for dysuria, hematuria and pelvic pain.   Musculoskeletal:  Positive for arthralgias and back pain. Negative for neck stiffness.    Skin:  Negative for rash and wound.   Allergic/Immunologic: Negative for environmental allergies and food allergies.   Neurological:  Positive for weakness. Negative for headaches.   Hematological:  Negative for adenopathy. Does not bruise/bleed easily.   Psychiatric/Behavioral:  Negative for hallucinations and suicidal ideas.       There were no vitals taken for this visit.  Physical Exam  Vitals reviewed.   Constitutional:       General: She is not in acute distress.     Appearance: She is not ill-appearing.   HENT:      Head: Normocephalic and atraumatic.      Right Ear: Tympanic membrane normal.      Left Ear: Tympanic membrane normal.      Nose: No congestion or rhinorrhea.      Mouth/Throat:      Pharynx: No oropharyngeal exudate or posterior oropharyngeal erythema.   Eyes:      Extraocular Movements: Extraocular movements intact.      Conjunctiva/sclera: Conjunctivae normal.      Pupils: Pupils are equal, round, and reactive to light.   Cardiovascular:      Rate and Rhythm: Normal rate and regular rhythm.      Heart sounds: No murmur heard.     No friction rub. No gallop.   Pulmonary:      Effort: Pulmonary effort is normal.      Breath sounds: Normal breath sounds. No wheezing, rhonchi or rales.   Abdominal:      General: There is no distension.      Palpations: Abdomen is soft.      Tenderness: There is no abdominal tenderness. There is no guarding or rebound.   Musculoskeletal:         General: No swelling or deformity.      Cervical back: Normal range of motion and neck supple.      Right lower leg: No edema.      Left lower leg: No edema.   Skin:     Capillary Refill: Capillary refill takes less than 2 seconds.      Coloration: Skin is not jaundiced.      Findings: No rash.   Neurological:      General: No focal deficit present.      Mental Status: She is alert.      Motor: Weakness present.   Psychiatric:         Mood and Affect: Mood normal.         Behavior: Behavior normal.       Lab Results  "  Component Value Date    WBC 5.0 02/14/2025    HGB 11.2 (L) 02/14/2025    HCT 36.6 02/14/2025    MCV 95 02/14/2025     02/14/2025     Lab Results   Component Value Date    CHOL 161 09/06/2022    CHOL 155 03/15/2022    CHOL 150 11/04/2021     Lab Results   Component Value Date    HDL 59.3 09/06/2022    HDL 60.0 03/15/2022    HDL 51.8 11/04/2021     No results found for: \"LDLCALC\"  Lab Results   Component Value Date    TRIG 136 09/06/2022    TRIG 130 03/15/2022    TRIG 136 11/04/2021     No components found for: \"CHOLHDL\"  Lab Results   Component Value Date    HGBA1C 4.8 09/06/2022       Assessment/Plan   Problem List Items Addressed This Visit       Chronic kidney disease, stage V (Multi)     Other Visit Diagnoses       HCAP (healthcare-associated pneumonia)    -  Primary    Paroxysmal A-fib (Multi)                  "

## 2025-02-17 NOTE — LETTER
Patient: Camelia Jones  : 1934    Encounter Date: 2025    Camelia Jones is a 89 y.o. female with Chief Complaint of SNF ([facility]) Re-Admit H&P    Resident seen 25 -- MP    CC: LTC ([facility]) Re-admit H&P    : 1934  SNF H&P done 23, 23, 25  DC Summary dated 25 reviewed 25  Allergy: Azithro, Codeine, PCN, Cipro, Sulfa, Corn  DNR-CCA, NO HD    S: 89 yo woman with HLD, CKD4, HTN, OA anemia and hyponatremia readmitted to LTC after lengthy hopital chelly for acute pneumonia complicated by new onset AFIB in RVR. Med List & Problem list reviewed. No cp/sob.    O: VSS AFEB 131# (up 5#) A&O NAD. Chest cta. heart rrr,  BRIDGET. Ext left LE Edema 3+ > Right 2+ wearing light weight compression stockings.    LAB (25) Hgb 11.6, Na 139, K 4.8, Cr 3.69, GFR 11, ->09167  (24) Ferritin 844, Iron 39, TIBC 210  (10/31/24)  TSH 3.93  (2024) , HDL 52, LDL 71  (23) Urine Electrophoresis -- No M Protein. Urine osmolality 513.    A/P:  # Muscle weakness and gait instability: Largely Wheelchair bound. Walks with walker at times.  Recommend PT eval and treat for strengthening and pain control -- part B if does not qualify for SNF.   # New onset AFIB w RVR: back in SR on Eliquis,   # CAP: resolved with course of IV abx 2025.   # Left>Right knee OA: improved with topical blue emu and CSI 24, 25 -- next no sooner than 25.   # Hx Hyponatremia: resolved OFF salt tablet. Monitor levels. Continue FR to 1800 cc 24.  # CHF/Edema: EUVOLEMIC (although BNP up, likely due to CKD5)even OFF torsemide. Can restart for any cardiopulmonary symptoms of fluid overload.   # Lumbar spondylosis and hx L5 & Sacral Ala fx: pain control inadequate with tylenol. Pt refuses oxycodone and tramadol. Completed SNF Rehab. LTC. F/U with ortho. Lidoderm patch.  # Depression and Chronic pain: patient declines offer of cymbalta 30 mg qam.  # B/L heel pain c/w PF -- night splints.  #  HTN: stable -- cut norvasc to 5, Coreg 12.5 bid.  # HLD: statin, ASA  # Anemia: persistent despite B12 replenishment. Getting Iron infusions and EPO per [facility] -- next appt 1/3/25.  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 F/U [facility] [place] (seen 11/19/24). NO HD per patients expressed advance directives. Stable on NaHCO3 for metabolic acidosis.  # Insomnia: Patient declines Ambien, melatonin and tramadol. Offer herbal tea, capsaicin cream.  # Hx Constipation: OFF tums, Ca, & Fe. Continue colace & miralax.  # Exposure to flu: tamiflu 30 mg q48h due to GFR 10-30.  Past Surgical History:   Procedure Laterality Date   • APPENDECTOMY  05/07/2013    Appendectomy   • CHOLECYSTECTOMY  11/30/2015    Cholecystectomy Laparoscopic   • OTHER SURGICAL HISTORY  05/07/2013    Treatment Of Hip Fracture   • OTHER SURGICAL HISTORY  05/07/2013    Marsupialization Of Ovarian Cyst Laparoscopic   • TOTAL ABDOMINAL HYSTERECTOMY  05/07/2013    Total Abdominal Hysterectomy      Social History     Socioeconomic History   • Marital status:      Spouse name: Not on file   • Number of children: Not on file   • Years of education: Not on file   • Highest education level: Not on file   Occupational History   • Not on file   Tobacco Use   • Smoking status: Never   • Smokeless tobacco: Never   Vaping Use   • Vaping status: Not on file   Substance and Sexual Activity   • Alcohol use: Never   • Drug use: Never   • Sexual activity: Defer   Other Topics Concern   • Not on file   Social History Narrative   • Not on file     Social Drivers of Health     Financial Resource Strain: Low Risk  (2/2/2025)    Overall Financial Resource Strain (CARDIA)    • Difficulty of Paying Living Expenses: Not very hard   Food Insecurity: No Food Insecurity (2/2/2025)    Hunger Vital Sign    • Worried About Running Out of Food in the Last Year: Never true    • Ran Out of Food in the Last Year: Never true   Transportation Needs: No Transportation Needs (2/2/2025)    PRAPARE -  Transportation    • Lack of Transportation (Medical): No    • Lack of Transportation (Non-Medical): No   Physical Activity: Not on file   Stress: No Stress Concern Present (2/2/2025)    [place] [employer] - Occupational Stress Questionnaire    • Feeling of Stress : Not at all   Social Connections: Not on file   Intimate Partner Violence: Not At Risk (2/2/2025)    Humiliation, Afraid, Rape, and Kick questionnaire    • Fear of Current or Ex-Partner: No    • Emotionally Abused: No    • Physically Abused: No    • Sexually Abused: No   Housing Stability: Low Risk  (2/14/2025)    Housing Stability Vital Sign    • Unable to Pay for Housing in the Last Year: No    • Number of Times Moved in the Last Year: 0    • Homeless in the Last Year: No     Past Medical History:   Diagnosis Date   • Other conditions influencing health status     Coronary Artery Disease   • Other constipation     Chronic constipation   • Pain in unspecified hip     Joint pain, hip   • Personal history of other diseases of the circulatory system     History of hypertension   • Personal history of other diseases of the respiratory system     History of allergic rhinitis   • Personal history of other endocrine, nutritional and metabolic disease     History of hyperlipidemia   • Personal history of other medical treatment 11/14/2019    History of screening mammography   • Personal history of other mental and behavioral disorders     History of depression      No family history on file.     Review of Systems   Constitutional:  Negative for chills, fatigue and fever.   HENT:  Negative for rhinorrhea and sore throat.    Eyes:  Negative for pain and redness.   Respiratory:  Negative for cough and shortness of breath.    Cardiovascular:  Negative for chest pain and palpitations.   Gastrointestinal:  Negative for abdominal pain and blood in stool.   Endocrine: Negative for polydipsia and polyuria.   Genitourinary:  Negative for dysuria,  hematuria and pelvic pain.   Musculoskeletal:  Positive for arthralgias and back pain. Negative for neck stiffness.   Skin:  Negative for rash and wound.   Allergic/Immunologic: Negative for environmental allergies and food allergies.   Neurological:  Positive for weakness. Negative for headaches.   Hematological:  Negative for adenopathy. Does not bruise/bleed easily.   Psychiatric/Behavioral:  Negative for hallucinations and suicidal ideas.       There were no vitals taken for this visit.  Physical Exam  Vitals reviewed.   Constitutional:       General: She is not in acute distress.     Appearance: She is not ill-appearing.   HENT:      Head: Normocephalic and atraumatic.      Right Ear: Tympanic membrane normal.      Left Ear: Tympanic membrane normal.      Nose: No congestion or rhinorrhea.      Mouth/Throat:      Pharynx: No oropharyngeal exudate or posterior oropharyngeal erythema.   Eyes:      Extraocular Movements: Extraocular movements intact.      Conjunctiva/sclera: Conjunctivae normal.      Pupils: Pupils are equal, round, and reactive to light.   Cardiovascular:      Rate and Rhythm: Normal rate and regular rhythm.      Heart sounds: No murmur heard.     No friction rub. No gallop.   Pulmonary:      Effort: Pulmonary effort is normal.      Breath sounds: Normal breath sounds. No wheezing, rhonchi or rales.   Abdominal:      General: There is no distension.      Palpations: Abdomen is soft.      Tenderness: There is no abdominal tenderness. There is no guarding or rebound.   Musculoskeletal:         General: No swelling or deformity.      Cervical back: Normal range of motion and neck supple.      Right lower leg: No edema.      Left lower leg: No edema.   Skin:     Capillary Refill: Capillary refill takes less than 2 seconds.      Coloration: Skin is not jaundiced.      Findings: No rash.   Neurological:      General: No focal deficit present.      Mental Status: She is alert.      Motor: Weakness  "present.   Psychiatric:         Mood and Affect: Mood normal.         Behavior: Behavior normal.       Lab Results   Component Value Date    WBC 5.0 02/14/2025    HGB 11.2 (L) 02/14/2025    HCT 36.6 02/14/2025    MCV 95 02/14/2025     02/14/2025     Lab Results   Component Value Date    CHOL 161 09/06/2022    CHOL 155 03/15/2022    CHOL 150 11/04/2021     Lab Results   Component Value Date    HDL 59.3 09/06/2022    HDL 60.0 03/15/2022    HDL 51.8 11/04/2021     No results found for: \"LDLCALC\"  Lab Results   Component Value Date    TRIG 136 09/06/2022    TRIG 130 03/15/2022    TRIG 136 11/04/2021     No components found for: \"CHOLHDL\"  Lab Results   Component Value Date    HGBA1C 4.8 09/06/2022       Assessment/Plan  Problem List Items Addressed This Visit       Chronic kidney disease, stage V (Multi)     Other Visit Diagnoses       HCAP (healthcare-associated pneumonia)    -  Primary    Paroxysmal A-fib (Multi)                  Electronically Signed By: Trav Rodriguez MD   2/17/25  6:09 PM  "

## 2025-02-25 ENCOUNTER — NURSING HOME VISIT (OUTPATIENT)
Dept: POST ACUTE CARE | Facility: EXTERNAL LOCATION | Age: OVER 89
End: 2025-02-25
Payer: COMMERCIAL

## 2025-02-25 DIAGNOSIS — K59.00 CONSTIPATION, UNSPECIFIED CONSTIPATION TYPE: ICD-10-CM

## 2025-02-25 DIAGNOSIS — R60.9 EDEMA, UNSPECIFIED TYPE: ICD-10-CM

## 2025-02-25 DIAGNOSIS — S32.10XD CLOSED FRACTURE OF SACRUM AND COCCYX WITH ROUTINE HEALING, SUBSEQUENT ENCOUNTER: ICD-10-CM

## 2025-02-25 DIAGNOSIS — E56.9 VITAMIN DEFICIENCY: ICD-10-CM

## 2025-02-25 DIAGNOSIS — R13.10 DYSPHAGIA, UNSPECIFIED TYPE: ICD-10-CM

## 2025-02-25 DIAGNOSIS — R11.0 NAUSEA: ICD-10-CM

## 2025-02-25 DIAGNOSIS — E78.5 DYSLIPIDEMIA: ICD-10-CM

## 2025-02-25 DIAGNOSIS — M62.81 MUSCLE WEAKNESS (GENERALIZED): Primary | ICD-10-CM

## 2025-02-25 DIAGNOSIS — Z29.89 NEED FOR PROPHYLAXIS AGAINST URINARY TRACT INFECTION: ICD-10-CM

## 2025-02-25 DIAGNOSIS — S32.059D CLOSED FRACTURE OF FIFTH LUMBAR VERTEBRA WITH ROUTINE HEALING, UNSPECIFIED FRACTURE MORPHOLOGY, SUBSEQUENT ENCOUNTER: ICD-10-CM

## 2025-02-25 DIAGNOSIS — I10 HYPERTENSION, UNSPECIFIED TYPE: ICD-10-CM

## 2025-02-25 DIAGNOSIS — S32.2XXD CLOSED FRACTURE OF SACRUM AND COCCYX WITH ROUTINE HEALING, SUBSEQUENT ENCOUNTER: ICD-10-CM

## 2025-02-25 DIAGNOSIS — I48.0 PAROXYSMAL A-FIB (MULTI): ICD-10-CM

## 2025-02-25 DIAGNOSIS — D64.9 ANEMIA, UNSPECIFIED TYPE: ICD-10-CM

## 2025-02-25 DIAGNOSIS — L21.9 SEBORRHEIC DERMATITIS: ICD-10-CM

## 2025-02-25 DIAGNOSIS — N18.5 CHRONIC KIDNEY DISEASE, STAGE V (MULTI): ICD-10-CM

## 2025-02-25 DIAGNOSIS — J18.9 HCAP (HEALTHCARE-ASSOCIATED PNEUMONIA): ICD-10-CM

## 2025-02-25 PROCEDURE — 99310 SBSQ NF CARE HIGH MDM 45: CPT | Performed by: NURSE PRACTITIONER

## 2025-02-27 ENCOUNTER — APPOINTMENT (OUTPATIENT)
Dept: INFUSION THERAPY | Facility: CLINIC | Age: OVER 89
End: 2025-02-27
Payer: COMMERCIAL

## 2025-03-03 NOTE — PROGRESS NOTES
*Provider Impression*    Patient is a 89 year old female who is seen today for management of multiple medical problems       #Weakness - PT/OT  #HCAP - completed antibiotics, albuterol q8h PRN, tessalon 100mg q8h, mucinex /30mg BID  #CKD / Edema / HTN / HLD / A-fib - fish oil 1000mg daily, pravastatin 20mg daily, ASA 81mg daily, metoprolol 100mg BID, f/u w/ Dr. Bernard, torsemide 10mg daily, NaCl 1gram BID, renal support drink daily, fluid restriction 1800mL daily, sodium bicarb 650mg BID, amlodipine 5mg daily, apixaban 2.5mg BID  #UTI prophylaxis - cranberry 600mg daily  #Constipation / Nausea / Dysphagia - consult SLP, zofran 4mg q6h PRN, miralax 17grams daily, senna 8.6mg BID, Tums 1000mg q6h PRN  #Seborrheic dermatitis - ketoconazole shampoo  #Fracture of L5 vertebra & L sacral ala / OA - acetaminophen 500mg BID q4h PRN, lidocaine patch daily, gabapentin 100mg daily, tramadol 50mg BID, turmeric 1000mg BID, BlueEmu BID and BID PRN, f/u w/ Jaclyn Hicks  #Anemia / Vitamin deficiency - vitamin E 400units daily, zinc 50mg daily, calcium 250mg daily, vitamin B12 1mg weekly, biotin 1mg daily, ferrous sulfate 325mg BID  #Allergic rhinitis - zyrtec 10mg daily, saline gel BID  #Insomnia - melatonin 3mg QHS PRN  #ACP - DNRCC-A  Follow up as needed      *Chief Complaint*     edema / PNA    *History of Present Illness*    Patient is a 89 y/o female LTC resident of [facility] w[facility] as below who is seen today for f/u and management of multiple medical problems.     She was sent to the ED with several days of worsening mentation.  She was recently diagnosed with UTI and pneumonia at facility initially placed on doxycycline and later transitioned to Invanz IM. In the ED VS was significant for mild hypertension.  Labs were significant for hypernatremia (151), hyperchloremia (114), azotemia from baseline (78/3.71), elevated BNP (861), elevated troponin (42), negative leukocytosis, negative flu/RSV/COVID.  CT head and  MRI brain was unrevealing for acute intracranial processes.  CT chest abdomen pelvis shows RML and RLL pneumonia, moderate L pleural effusion and trace R pleural effusion. Pleural fluid is too small to safely perform Thora. ECG showed no acute ST changes or malignant arrhythmia. She was admitted for hypernatremia and WARREN but later developed Afib with RVR. Prolonged hospital stay due to AMS and poor PO intake. Family made aware that hospice is the next best step if anorexia persist w/ associated complications ie electrolyte disturbance and/or WARREN. She clinically improved and later discharged back to [facility] on 2/14.    Her labs apprecaited as below.    She is seen sitting up in her room today and reports feeling tired, getting therapy, no f/c, sweats, CP, SOB, little cough, n/v, edema improved and no other new c/o presently.     [place]eriges - Azithromycin, Codeine, Penicillin, Cipro, Sulfa Antibiotics, Corn   PMH - hypertension, hyperlipidemia, anemia, CKD, DVT, anemia, OA, PVD, CAD,   PSH -  hip surgery, appendectomy, marsupialization of ovarian cyst, HOMERO  FH - Brother had A-fib  SocHx - Never smoker, No EtOH    *Review of Systems*  All other systems reviewed are negative except as noted in the HPI     *Vital Signs*   Date: 2/25/25  - T: 98.8  P: 105  R: 16  BP: 127/68 SpO2: 96% on RA ; Date: 2/25/25 - Wt: 163.6      *Results / Data*  CBC - Date: 2/17/25  WBC: 6.68  HGB: 11.6  HCT: 39.5  PLT: 282  ;   BMP - Date: 2/24/25  Na: 145  K: 4.5  Cl: 109  CO2: 19  BUN: 77  Cr: 3.16  Glu: 64  Ca: 7.8  ;   LFT - Date: 2/17/25  AST: 30  ALT: 16  ALP: 80  Tbili: <0.2  ;   Coags - Date:   INR:   PT:  Other - Date: 9/14/23  Iron: 34  TIBC: 186  B12: >2000  Folate: >20.0  Ferritin: >2000  Retic: 2.5%; 10/2/23  Iron: 25  TIBC: 190  Ferritin: 1455  B12: >2000; 11/8/23  Ferritin: 1165  Iron: 31 ; 8/14/24  BNP: 335; 10/23/24  BNP: 560 ; 1/24/25  TSH: 1.350    *Physical Exam*  Gen: (+) NAD, (+) well-appearing  HEENT: (+)  normocephalic, (+) MMM  Neck: (+) supple  Lungs: (+) CTAB, (-) wheezes, (-) rales, (-) rhonchi  Heart: (+) RRR, (+) S1 S2, (+) 1/6 BRIDGET  Pulses: (+) palpable  Abd: (+) soft, (+) NT, (+) ND, (+) BS+  Ext: (-) edema, (-) deformity  MSK: (-) joint swelling  Skin: (+) warm, (+) dry, (-) rash  Neuro: (+) follows commands, (-) tremor, (+) alert

## 2025-03-06 ENCOUNTER — NURSING HOME VISIT (OUTPATIENT)
Dept: POST ACUTE CARE | Facility: EXTERNAL LOCATION | Age: OVER 89
End: 2025-03-06
Payer: COMMERCIAL

## 2025-03-06 DIAGNOSIS — N18.5 CHRONIC KIDNEY DISEASE, STAGE V (MULTI): ICD-10-CM

## 2025-03-06 DIAGNOSIS — I10 HYPERTENSION, UNSPECIFIED TYPE: Primary | ICD-10-CM

## 2025-03-06 DIAGNOSIS — I48.0 PAROXYSMAL A-FIB (MULTI): ICD-10-CM

## 2025-03-06 PROCEDURE — 99309 SBSQ NF CARE MODERATE MDM 30: CPT | Performed by: FAMILY MEDICINE

## 2025-03-06 NOTE — PROGRESS NOTES
Resident seen 3/6/25 -- MP    CC: LTC ([facility]) Recheck    : 1934  SNF H&P done 23, 23, 25  DC Summary dated 25 reviewed 25  Allergy: Azithro, Codeine, PCN, Cipro, Sulfa, Corn  DNR-CCA, NO HD    S: 89 yo woman with HLD, CKD4, HTN, OA anemia and hyponatremia with persistent weakness following recent pneumonia complicated by new onset AFIB. Making progress with part B PT/OT. Med List & Problem list reviewed. No cp/sob.    O: VSS AFEB 131# (stable) A&O NAD. Chest cta. heart rrr,  BRIDGET. Ext left LE Edema 3+ > Right 2+ wearing light weight compression stockings.    LAB (25) Hgb 11.6, Na 139, K 4.8, Cr 3.69, GFR 11, ->95749  (24) Ferritin 844, Iron 39, TIBC 210  (10/31/24) TSH 3.93  (2024) , HDL 52, LDL 71  (23) Urine Electrophoresis -- No M Protein. Urine osmolality 513.    A/P:  # Muscle weakness and gait instability: Largely Wheelchair bound. Walks with walker at times. Recommend continuing PT for strengthening and pain control.  # New onset AFIB w RVR: in SR on Eliquis,  # CAP: resolved with course of IV abx 2025.  # Left>Right knee OA: improved with topical blue emu and CSI 24, 25 -- next no sooner than 25.  # Hx Hyponatremia: resolved OFF salt tablet. Monitor levels. Continue FR to 1800 cc 24.  # CHF/Edema: EUVOLEMIC (although BNP up, likely due to CKD5)even OFF torsemide. Can restart for any cardiopulmonary symptoms of fluid overload.  # Lumbar spondylosis and hx L5 & Sacral Ala fx: pain control inadequate with tylenol. Pt refuses oxycodone and tramadol. Completed SNF Rehab. LTC. F/U with ortho. Lidoderm patch.  # Depression and Chronic pain: patient declines offer of cymbalta 30 mg qam.  # B/L heel pain c/w PF -- night splints.  # HTN: stable -- cut norvasc to 5, Coreg 12.5 bid.  # HLD: statin, ASA  # Anemia: persistent despite B12 replenishment. Getting Iron infusions and EPO per Nephro -- next appt 1/3/25.  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 F/U Nephro [place] (seen 11/19/24). NO HD per patients expressed advance directives. Stable on NaHCO3 for metabolic acidosis.  # Insomnia: Patient declines Ambien, melatonin and tramadol. Offer herbal tea, capsaicin cream.  # Hx Constipation: OFF tums, Ca, & Fe. Continue colace & miralax.  # Exposure to flu: tamiflu 30 mg q48h due to GFR 10-30.

## 2025-03-06 NOTE — LETTER
Patient: Camelia Jones  : 1934    Encounter Date: 2025    Resident seen 3/6/25 -- MP    CC: LTC ([facility]) Recheck    : 1934  SNF H&P done 23, 23, 25  DC Summary dated 25 reviewed 25  Allergy: Azithro, Codeine, PCN, Cipro, Sulfa, Corn  DNR-CCA, NO HD    S: 89 yo woman with HLD, CKD4, HTN, OA anemia and hyponatremia with persistent weakness following recent pneumonia complicated by new onset AFIB. Making progress with part B PT/OT. Med List & Problem list reviewed. No cp/sob.    O: VSS AFEB 131# (stable) A&O NAD. Chest cta. heart rrr,  BRIDGET. Ext left LE Edema 3+ > Right 2+ wearing light weight compression stockings.    LAB (25) Hgb 11.6, Na 139, K 4.8, Cr 3.69, GFR 11, ->44511  (24) Ferritin 844, Iron 39, TIBC 210  (10/31/24) TSH 3.93  (2024) , HDL 52, LDL 71  (23) Urine Electrophoresis -- No M Protein. Urine osmolality 513.    A/P:  # Muscle weakness and gait instability: Largely Wheelchair bound. Walks with walker at times. Recommend continuing PT for strengthening and pain control.  # New onset AFIB w RVR: in SR on Eliquis,  # CAP: resolved with course of IV abx 2025.  # Left>Right knee OA: improved with topical blue emu and CSI 24, 25 -- next no sooner than 25.  # Hx Hyponatremia: resolved OFF salt tablet. Monitor levels. Continue FR to 1800 cc 24.  # CHF/Edema: EUVOLEMIC (although BNP up, likely due to CKD5)even OFF torsemide. Can restart for any cardiopulmonary symptoms of fluid overload.  # Lumbar spondylosis and hx L5 & Sacral Ala fx: pain control inadequate with tylenol. Pt refuses oxycodone and tramadol. Completed SNF Rehab. LTC. F/U with ortho. Lidoderm patch.  # Depression and Chronic pain: patient declines offer of cymbalta 30 mg qam.  # B/L heel pain c/w PF -- night splints.  # HTN: stable -- cut norvasc to 5, Coreg 12.5 bid.  # HLD: statin, ASA  # Anemia: persistent despite B12 replenishment. Getting Iron  infusions and EPO per Nephro -- next appt 1/3/25.  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 F/U [facility] [place] (seen 11/19/24). NO HD per patients expressed advance directives. Stable on NaHCO3 for metabolic acidosis.  # Insomnia: Patient declines Ambien, melatonin and tramadol. Offer herbal tea, capsaicin cream.  # Hx Constipation: OFF tums, Ca, & Fe. Continue colace & miralax.  # Exposure to flu: tamiflu 30 mg q48h due to GFR 10-30.      Electronically Signed By: Trav Rodriguez MD   3/6/25  6:19 PM

## 2025-03-13 ENCOUNTER — APPOINTMENT (OUTPATIENT)
Dept: INFUSION THERAPY | Facility: CLINIC | Age: OVER 89
End: 2025-03-13
Payer: COMMERCIAL

## 2025-03-14 ENCOUNTER — NURSING HOME VISIT (OUTPATIENT)
Dept: POST ACUTE CARE | Facility: EXTERNAL LOCATION | Age: OVER 89
End: 2025-03-14
Payer: COMMERCIAL

## 2025-03-14 DIAGNOSIS — S32.059D CLOSED FRACTURE OF FIFTH LUMBAR VERTEBRA WITH ROUTINE HEALING, UNSPECIFIED FRACTURE MORPHOLOGY, SUBSEQUENT ENCOUNTER: ICD-10-CM

## 2025-03-14 DIAGNOSIS — I10 HYPERTENSION, UNSPECIFIED TYPE: ICD-10-CM

## 2025-03-14 DIAGNOSIS — J30.9 ALLERGIC RHINITIS, UNSPECIFIED SEASONALITY, UNSPECIFIED TRIGGER: ICD-10-CM

## 2025-03-14 DIAGNOSIS — Z29.89 NEED FOR PROPHYLAXIS AGAINST URINARY TRACT INFECTION: ICD-10-CM

## 2025-03-14 DIAGNOSIS — L21.9 SEBORRHEIC DERMATITIS: ICD-10-CM

## 2025-03-14 DIAGNOSIS — E78.5 DYSLIPIDEMIA: ICD-10-CM

## 2025-03-14 DIAGNOSIS — S32.10XD CLOSED FRACTURE OF SACRUM AND COCCYX WITH ROUTINE HEALING, SUBSEQUENT ENCOUNTER: ICD-10-CM

## 2025-03-14 DIAGNOSIS — E56.9 VITAMIN DEFICIENCY: ICD-10-CM

## 2025-03-14 DIAGNOSIS — R60.9 EDEMA, UNSPECIFIED TYPE: ICD-10-CM

## 2025-03-14 DIAGNOSIS — K59.00 CONSTIPATION, UNSPECIFIED CONSTIPATION TYPE: ICD-10-CM

## 2025-03-14 DIAGNOSIS — N18.5 CHRONIC KIDNEY DISEASE, STAGE V (MULTI): ICD-10-CM

## 2025-03-14 DIAGNOSIS — M62.81 MUSCLE WEAKNESS (GENERALIZED): Primary | ICD-10-CM

## 2025-03-14 DIAGNOSIS — R11.0 NAUSEA: ICD-10-CM

## 2025-03-14 DIAGNOSIS — S32.2XXD CLOSED FRACTURE OF SACRUM AND COCCYX WITH ROUTINE HEALING, SUBSEQUENT ENCOUNTER: ICD-10-CM

## 2025-03-14 DIAGNOSIS — M17.12 PRIMARY OSTEOARTHRITIS OF LEFT KNEE: ICD-10-CM

## 2025-03-14 DIAGNOSIS — I48.0 PAROXYSMAL A-FIB (MULTI): ICD-10-CM

## 2025-03-14 DIAGNOSIS — D64.9 ANEMIA, UNSPECIFIED TYPE: ICD-10-CM

## 2025-03-14 DIAGNOSIS — J18.9 HCAP (HEALTHCARE-ASSOCIATED PNEUMONIA): ICD-10-CM

## 2025-03-14 PROCEDURE — 99309 SBSQ NF CARE MODERATE MDM 30: CPT | Performed by: NURSE PRACTITIONER

## 2025-03-16 LAB
ATRIAL RATE: 108 BPM
ATRIAL RATE: 119 BPM
ATRIAL RATE: 76 BPM
Q ONSET: 223 MS
Q ONSET: 223 MS
Q ONSET: 224 MS
QRS COUNT: 12 BEATS
QRS COUNT: 18 BEATS
QRS COUNT: 18 BEATS
QRS DURATION: 72 MS
QRS DURATION: 76 MS
QRS DURATION: 80 MS
QT INTERVAL: 364 MS
QT INTERVAL: 376 MS
QT INTERVAL: 432 MS
QTC CALCULATION(BAZETT): 479 MS
QTC CALCULATION(BAZETT): 495 MS
QTC CALCULATION(BAZETT): 503 MS
QTC FREDERICIA: 447 MS
QTC FREDERICIA: 457 MS
QTC FREDERICIA: 463 MS
R AXIS: -30 DEGREES
R AXIS: -35 DEGREES
R AXIS: -37 DEGREES
T AXIS: 102 DEGREES
T AXIS: 49 DEGREES
T AXIS: 92 DEGREES
T OFFSET: 405 MS
T OFFSET: 412 MS
T OFFSET: 439 MS
VENTRICULAR RATE: 108 BPM
VENTRICULAR RATE: 111 BPM
VENTRICULAR RATE: 74 BPM

## 2025-03-18 NOTE — PROGRESS NOTES
*Provider Impression*    Patient is a 89 year old female who is seen today for management of multiple medical problems       #Weakness - PT/OT  #HCAP - completed antibiotics, albuterol q8h PRN, tessalon 100mg q8h, mucinex /30mg BID PRN  #CKD / Edema / HTN / HLD / A-fib - fish oil 1000mg daily, pravastatin 20mg daily, ASA 81mg daily, metoprolol 100mg BID, f/u w/ Dr. Bernard, torsemide 10mg daily, NaCl 1gram BID, renal support drink daily, fluid restriction 1800mL daily, sodium bicarb 650mg BID, amlodipine 5mg daily, apixaban 2.5mg BID  #UTI prophylaxis - cranberry 600mg daily  #Constipation / Nausea / Dysphagia - consult SLP, zofran 4mg q6h PRN, miralax 17grams daily, senna 8.6mg BID, Tums 1000mg q6h PRN  #Seborrheic dermatitis - ketoconazole shampoo  #Fracture of L5 vertebra & L sacral ala / OA - acetaminophen 500mg BID q4h PRN, lidocaine patch daily, gabapentin 100mg daily, tramadol 50mg BID, turmeric 1000mg BID, BlueEmu BID and BID PRN, f/u w/ Jaclyn Hicks  #Anemia / Vitamin deficiency - vitamin E 400units daily, zinc 50mg daily, calcium 250mg daily, vitamin B12 1mg monthly, biotin 1mg daily, ferrous sulfate 325mg BID  #Allergic rhinitis - zyrtec 10mg daily, saline gel BID  #Insomnia - melatonin 3mg QHS PRN  #ACP - DNRCC-A  Follow up as needed      *Chief Complaint*     edema / PNA    *History of Present Illness*    Patient is a 89 y/o female LTC resident of [facility] w/ [facility] as below who is seen today for f/u and management of multiple medical problems.     She was sent to the ED with several days of worsening mentation.  She was recently diagnosed with UTI and pneumonia at facility initially placed on doxycycline and later transitioned to Invanz IM. In the ED VS was significant for mild hypertension.  Labs were significant for hypernatremia (151), hyperchloremia (114), azotemia from baseline (78/3.71), elevated BNP (861), elevated troponin (42), negative leukocytosis, negative flu/RSV/COVID.  CT head  and MRI brain was unrevealing for acute intracranial processes.  CT chest abdomen pelvis shows RML and RLL pneumonia, moderate L pleural effusion and trace R pleural effusion. Pleural fluid is too small to safely perform Thora. ECG showed no acute ST changes or malignant arrhythmia. She was admitted for hypernatremia and WARREN but later developed Afib with RVR. Prolonged hospital stay due to AMS and poor PO intake. Family made aware that hospice is the next best step if anorexia persist w/ associated complications ie electrolyte disturbance and/or WARREN. She clinically improved and later discharged back to [facility] on 2/14.    Her labs appreciated as below. B12 changed to monthly. Stared on mucinex for cough.    She is seen sitting up in her room today an denies any f/c, sweats, CP, SOB, cough, n/v, appetite isn't better yet, some constiption x3d, feeling tired, but no other new c/o presently.     We review her medications but she is declining any changes at this time.     [place]eriges - Azithromycin, Codeine, Penicillin, Cipro, Sulfa Antibiotics, Corn   PMH - hypertension, hyperlipidemia, anemia, CKD, DVT, anemia, OA, PVD, CAD,   PSH -  hip surgery, appendectomy, marsupialization of ovarian cyst, HOMERO  FH - Brother had A-fib  SocHx - Never smoker, No EtOH    *Review of Systems*  All other systems reviewed are negative except as noted in the HPI     *Vital Signs*   Date: 3/4/25  - T: 97.5  P: 63  R: 17  BP: 137/76 SpO2: 93% on RA ; Date: 3/10/25 - Wt: 130      *Results / Data*  CBC - Date: 3/10/25  WBC: 5.82  HGB: 10.4  HCT: 35.0  PLT: 246  ;   BMP - Date: 3/10/25  Na: 151  K: 4.4  Cl: 117  CO2: 23  BUN: 66  Cr: 3.19  Glu: 75  Ca: 8.2  ;   LFT - Date: 2/17/25  AST: 30  ALT: 16  ALP: 80  Tbili: <0.2  ;   Coags - Date:   INR:   PT:  Other - Date: 9/14/23  Iron: 34  TIBC: 186  B12: >2000  Folate: >20.0  Ferritin: >2000  Retic: 2.5%; 10/2/23  Iron: 25  TIBC: 190  Ferritin: 1455  B12: >2000; 11/8/23  Ferritin: 1165  Iron:  31 ; 8/14/24  BNP: 335; 10/23/24  BNP: 560 ; 1/24/25  TSH: 1.350    *Physical Exam*  Gen: (+) NAD, (+) well-appearing  HEENT: (+) normocephalic, (+) MMM  Neck: (+) supple  Lungs: (+) CTAB, (-) wheezes, (-) rales, (-) rhonchi  Heart: (+) RRR, (+) S1 S2, (+) 1/6 BRIDGET  Pulses: (+) palpable  Abd: (+) soft, (+) NT, (+) ND, (+) BS+  Ext: (-) edema, (-) deformity  MSK: (-) joint swelling  Skin: (+) warm, (+) dry, (-) rash  Neuro: (+) follows commands, (-) tremor, (+) alert

## 2025-03-27 ENCOUNTER — APPOINTMENT (OUTPATIENT)
Dept: INFUSION THERAPY | Facility: CLINIC | Age: OVER 89
End: 2025-03-27
Payer: COMMERCIAL

## 2025-03-31 ENCOUNTER — APPOINTMENT (OUTPATIENT)
Dept: DERMATOLOGY | Facility: CLINIC | Age: OVER 89
End: 2025-03-31
Payer: COMMERCIAL

## 2025-04-03 ENCOUNTER — NURSING HOME VISIT (OUTPATIENT)
Dept: POST ACUTE CARE | Facility: EXTERNAL LOCATION | Age: OVER 89
End: 2025-04-03
Payer: COMMERCIAL

## 2025-04-03 DIAGNOSIS — N18.5 CHRONIC KIDNEY DISEASE, STAGE V (MULTI): ICD-10-CM

## 2025-04-03 DIAGNOSIS — M62.81 MUSCLE WEAKNESS (GENERALIZED): Primary | ICD-10-CM

## 2025-04-03 DIAGNOSIS — R60.9 EDEMA, UNSPECIFIED TYPE: ICD-10-CM

## 2025-04-03 DIAGNOSIS — I48.0 PAROXYSMAL A-FIB (MULTI): ICD-10-CM

## 2025-04-03 PROCEDURE — 99309 SBSQ NF CARE MODERATE MDM 30: CPT | Performed by: FAMILY MEDICINE

## 2025-04-03 NOTE — PROGRESS NOTES
Resident seen 4/3/25 -- MP    CC: LTC ([facility]) Recheck    : 1934  SNF H&P done 23, 23, 25  DC Summary dated 25 reviewed 25  Allergy: Azithro, Codeine, PCN, Cipro, Sulfa, Corn  DNR-CCA, NO HD    S: 89 yo woman with HLD, CKD4, HTN, OA anemia and hyponatremia with persistent weakness following recent pneumonia complicated by new onset AFIB. Completed part B PT -- plateaued, now WC bound. Med List & Problem list reviewed. No cp/sob.    O: VSS AFEB 128# (down 3#) A&O NAD. Chest cta. heart rrr,  BRIDGET. Ext left LE Edema 2+ > Right 1+ wearing TEDs.    LAB (3/31/25) Hgb 11.6->12.5, Na 150, K 4.1, Cr 3.69->3.25, GFR 13  (24) Ferritin 844, Iron 39, TIBC 210  (10/31/24) TSH 3.93  (2024) , HDL 52, LDL 71  (23) Urine Electrophoresis -- No M Protein. Urine osmolality 513.    A/P:  # Muscle weakness and gait instability: Wheelchair bound. Plateaued on PT. Appropriate for LTC.   # AFIB: in SR on Eliquis,  # Left>Right knee OA: improved with topical blue emu and CSI 24, 25 -- next no sooner than 25.  # Hx Hyponatremia: resolved OFF salt tablet. FR 1800 cc since 24.  # CHF/Edema: EUVOLEMIC (although BNP up, likely due to CKD5) even OFF torsemide. Can restart for any cardiopulmonary symptoms of fluid overload.  # Lumbar spondylosis and hx L5 & Sacral Ala fx: pain control inadequate with tylenol. Pt refuses oxycodone and tramadol. Completed SNF Rehab. LTC. F/U with ortho. Lidoderm patch.  # Depression and Chronic pain: patient declines offer of cymbalta 30 mg qam.  # B/L heel pain c/w PF -- night splints.  # HTN: stable -- cut norvasc to 5, Coreg 12.5 bid.  # HLD: statin, ASA  # Anemia: persistent despite B12 replenishment. Getting Iron infusions and EPO per Nephro -- next appt 1/3/25.  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 F/U Nephro [place] (seen 24). NO HD per patients expressed advance directives. Stable on NaHCO3 for metabolic acidosis.  # Insomnia: Patient declines  Ambien, melatonin and tramadol. Offer herbal tea, capsaicin cream.  # Hx Constipation: OFF tums, Ca, & Fe. Continue colace & miralax.  # Exposure to flu: tamiflu 30 mg q48h due to GFR 10-30.  # Hx CAP: resolved with course of IV abx 2/2025.

## 2025-04-03 NOTE — LETTER
Patient: Camelia Jones  : 1934    Encounter Date: 2025    Resident seen 4/3/25 -- MP    CC: LTC ([facility]) Recheck    : 1934  SNF H&P done 23, 23, 25  DC Summary dated 25 reviewed 25  Allergy: Azithro, Codeine, PCN, Cipro, Sulfa, Corn  DNR-CCA, NO HD    S: 89 yo woman with HLD, CKD4, HTN, OA anemia and hyponatremia with persistent weakness following recent pneumonia complicated by new onset AFIB. Completed part B PT -- plateaued, now WC bound. Med List & Problem list reviewed. No cp/sob.    O: VSS AFEB 128# (down 3#) A&O NAD. Chest cta. heart rrr,  BRIDGET. Ext left LE Edema 2+ > Right 1+ wearing TEDs.    LAB (3/31/25) Hgb 11.6->12.5, Na 150, K 4.1, Cr 3.69->3.25, GFR 13  (24) Ferritin 844, Iron 39, TIBC 210  (10/31/24) TSH 3.93  (2024) , HDL 52, LDL 71  (23) Urine Electrophoresis -- No M Protein. Urine osmolality 513.    A/P:  # Muscle weakness and gait instability: Wheelchair bound. Plateaued on PT. Appropriate for LTC.   # AFIB: in SR on Eliquis,  # Left>Right knee OA: improved with topical blue emu and CSI 24, 25 -- next no sooner than 25.  # Hx Hyponatremia: resolved OFF salt tablet. FR 1800 cc since 24.  # CHF/Edema: EUVOLEMIC (although BNP up, likely due to CKD5) even OFF torsemide. Can restart for any cardiopulmonary symptoms of fluid overload.  # Lumbar spondylosis and hx L5 & Sacral Ala fx: pain control inadequate with tylenol. Pt refuses oxycodone and tramadol. Completed SNF Rehab. LTC. F/U with ortho. Lidoderm patch.  # Depression and Chronic pain: patient declines offer of cymbalta 30 mg qam.  # B/L heel pain c/w PF -- night splints.  # HTN: stable -- cut norvasc to 5, Coreg 12.5 bid.  # HLD: statin, ASA  # Anemia: persistent despite B12 replenishment. Getting Iron infusions and EPO per Nephro -- next appt 1/3/25.  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 F/U Nephro [place] (seen 24). NO HD per patients expressed advance directives.  Stable on NaHCO3 for metabolic acidosis.  # Insomnia: Patient declines Ambien, melatonin and tramadol. Offer herbal tea, capsaicin cream.  # Hx Constipation: OFF tums, Ca, & Fe. Continue colace & miralax.  # Exposure to flu: tamiflu 30 mg q48h due to GFR 10-30.  # Hx CAP: resolved with course of IV abx 2/2025.      Electronically Signed By: Trav Rodriguez MD   4/3/25  7:20 PM

## 2025-04-10 ENCOUNTER — APPOINTMENT (OUTPATIENT)
Dept: INFUSION THERAPY | Facility: CLINIC | Age: OVER 89
End: 2025-04-10
Payer: COMMERCIAL

## 2025-04-24 ENCOUNTER — APPOINTMENT (OUTPATIENT)
Dept: INFUSION THERAPY | Facility: CLINIC | Age: OVER 89
End: 2025-04-24
Payer: COMMERCIAL

## 2025-04-25 ENCOUNTER — DOCUMENTATION (OUTPATIENT)
Dept: INFUSION THERAPY | Facility: CLINIC | Age: OVER 89
End: 2025-04-25
Payer: COMMERCIAL

## 2025-04-25 NOTE — PROGRESS NOTES
CLINICAL CLEARANCE FOR OUTPATIENT INJECTION      Patient to be scheduled for Re-Start of Retacrit injections.    For Diagnosis: Anemia Due To Chronic Kidney Disease    Dosing is weight based at: FLAT DOSE     For a total dose of 20,000 units every 2 weeks.    Per the Rx, Retacrit to be administered ONLY if the Hgb is <= 11g/dl, there is an increase in Hgb > 1g/dl or there is a SBP > 160 mmHg     If the Hgb is >11 g/dl we will hold the injection and reschedule pt for next injection for the next week.    If there is an increase in Hgb > 1g/dl or there is a SBP > 160 mmHg, notify the provider.    Labs..  CBC drawn on: 4/9/2025 from [facility]    WBC 7.58 / RBC 3.26 / Hgb 9.6 / Hematocrit 32 / MCV 98.2 /     Lab Results   Component Value Date    WBC 5.0 02/14/2025    HGB 11.2 (L) 02/14/2025    HCT 36.6 02/14/2025    MCV 95 02/14/2025     02/14/2025      Hgb:   Lab Results   Component Value Date    HGB 11.2 (L) 02/14/2025      Iron Studies completed on: 1/6/2025 from [facility]     Iron 44 / TIBC 214 / Transferrin 20.6 / Ferritin 1016     Lab Results   Component Value Date    IRON 36 08/21/2023    TIBC 210 (L) 08/21/2023    FERRITIN 1,113 (H) 08/21/2023        Diagnosis of hypertension?  Yes  Problem List[1] Medical History[2]     Last injection received: 1/3/2025 (if continuation)    Due: ANY TIME    Okay to schedule treatment as ordered per prescribing provider.        [1]   Patient Active Problem List  Diagnosis    Closed fracture of fifth lumbar vertebra with routine healing    Hypertension    Urinary obstruction    Vitamin B12 deficiency    Wound of skin    Varicose veins of lower extremity    Post-thrombotic syndrome of left lower extremity    Left leg swelling    Chronic deep vein thrombosis (DVT) of right iliac vein (Multi)    Acute deep vein thrombosis (DVT) of popliteal vein of both lower extremities    Hyponatremia    Closed nondisplaced fracture of pelvis with routine healing    Insomnia     Primary osteoarthritis of left knee    Closed fracture of sacrum and coccyx with routine healing    Constipation    Chronic kidney disease, stage V (Multi)    Closed compression fracture of body of L1 vertebra (Multi)    Anemia    Sinusitis    Hordeolum externum of left upper eyelid    Lumbar spondylosis    Edema    Primary osteoarthritis of right knee    Osteoarthritis    Hordeolum externum of right eye    Congestive heart failure    Urinary tract infection with hematuria    Hypernatremia   [2]   Past Medical History:  Diagnosis Date    Other conditions influencing health status     Coronary Artery Disease    Other constipation     Chronic constipation    Pain in unspecified hip     Joint pain, hip    Personal history of other diseases of the circulatory system     History of hypertension    Personal history of other diseases of the respiratory system     History of allergic rhinitis    Personal history of other endocrine, nutritional and metabolic disease     History of hyperlipidemia    Personal history of other medical treatment 11/14/2019    History of screening mammography    Personal history of other mental and behavioral disorders     History of depression

## 2025-05-07 ENCOUNTER — TELEPHONE (OUTPATIENT)
Dept: PRIMARY CARE | Facility: CLINIC | Age: OVER 89
End: 2025-05-07
Payer: COMMERCIAL

## 2025-05-08 ENCOUNTER — APPOINTMENT (OUTPATIENT)
Dept: INFUSION THERAPY | Facility: CLINIC | Age: OVER 89
End: 2025-05-08
Payer: COMMERCIAL

## 2025-05-22 ENCOUNTER — APPOINTMENT (OUTPATIENT)
Dept: INFUSION THERAPY | Facility: CLINIC | Age: OVER 89
End: 2025-05-22
Payer: COMMERCIAL

## 2025-07-21 ENCOUNTER — TRANSCRIBE ORDERS (OUTPATIENT)
Dept: INFUSION THERAPY | Facility: CLINIC | Age: OVER 89
End: 2025-07-21
Payer: COMMERCIAL

## 2025-08-21 ENCOUNTER — TELEPHONE (OUTPATIENT)
Dept: DERMATOLOGY | Facility: CLINIC | Age: OVER 89
End: 2025-08-21
Payer: COMMERCIAL